# Patient Record
Sex: FEMALE | Race: WHITE | NOT HISPANIC OR LATINO | Employment: OTHER | URBAN - METROPOLITAN AREA
[De-identification: names, ages, dates, MRNs, and addresses within clinical notes are randomized per-mention and may not be internally consistent; named-entity substitution may affect disease eponyms.]

---

## 2017-01-19 RX ORDER — PRAVASTATIN SODIUM 20 MG
TABLET ORAL
Qty: 90 TABLET | Refills: 1 | Status: SHIPPED | OUTPATIENT
Start: 2017-01-19 | End: 2017-06-13 | Stop reason: SDUPTHER

## 2017-04-24 DIAGNOSIS — Z79.899 HIGH RISK MEDICATION USE: Primary | ICD-10-CM

## 2017-04-24 RX ORDER — CARVEDILOL 12.5 MG/1
TABLET ORAL
Qty: 60 TABLET | Refills: 5 | Status: SHIPPED | OUTPATIENT
Start: 2017-04-24 | End: 2017-10-23 | Stop reason: SDUPTHER

## 2017-04-29 ENCOUNTER — RESULTS ENCOUNTER (OUTPATIENT)
Dept: CARDIOLOGY | Facility: CLINIC | Age: 67
End: 2017-04-29

## 2017-04-29 DIAGNOSIS — Z79.899 HIGH RISK MEDICATION USE: ICD-10-CM

## 2017-05-12 PROCEDURE — 87086 URINE CULTURE/COLONY COUNT: CPT | Performed by: NURSE PRACTITIONER

## 2017-06-14 RX ORDER — PRAVASTATIN SODIUM 20 MG
TABLET ORAL
Qty: 90 TABLET | Refills: 1 | Status: SHIPPED | OUTPATIENT
Start: 2017-06-14 | End: 2017-12-22 | Stop reason: SDUPTHER

## 2017-06-14 RX ORDER — CLOPIDOGREL BISULFATE 75 MG/1
TABLET ORAL
Qty: 90 TABLET | Refills: 2 | Status: SHIPPED | OUTPATIENT
Start: 2017-06-14 | End: 2017-07-26 | Stop reason: SDUPTHER

## 2017-07-26 RX ORDER — CLOPIDOGREL BISULFATE 75 MG/1
TABLET ORAL
Qty: 90 TABLET | Refills: 2 | Status: SHIPPED | OUTPATIENT
Start: 2017-07-26 | End: 2018-09-18 | Stop reason: SDUPTHER

## 2017-09-06 PROCEDURE — 87186 SC STD MICRODIL/AGAR DIL: CPT | Performed by: EMERGENCY MEDICINE

## 2017-09-06 PROCEDURE — 87086 URINE CULTURE/COLONY COUNT: CPT | Performed by: EMERGENCY MEDICINE

## 2017-09-06 PROCEDURE — 87077 CULTURE AEROBIC IDENTIFY: CPT | Performed by: EMERGENCY MEDICINE

## 2017-09-09 ENCOUNTER — TELEPHONE (OUTPATIENT)
Dept: URGENT CARE | Facility: CLINIC | Age: 67
End: 2017-09-09

## 2017-10-23 RX ORDER — CARVEDILOL 12.5 MG/1
TABLET ORAL
Qty: 60 TABLET | Refills: 5 | Status: SHIPPED | OUTPATIENT
Start: 2017-10-23 | End: 2018-03-28 | Stop reason: SDUPTHER

## 2017-11-08 ENCOUNTER — TRANSCRIBE ORDERS (OUTPATIENT)
Dept: ADMINISTRATIVE | Facility: HOSPITAL | Age: 67
End: 2017-11-08

## 2017-11-08 DIAGNOSIS — Z12.31 VISIT FOR SCREENING MAMMOGRAM: Primary | ICD-10-CM

## 2017-12-07 ENCOUNTER — HOSPITAL ENCOUNTER (OUTPATIENT)
Dept: MAMMOGRAPHY | Facility: HOSPITAL | Age: 67
Discharge: HOME | End: 2017-12-07
Attending: FAMILY MEDICINE | Admitting: FAMILY MEDICINE

## 2017-12-07 DIAGNOSIS — Z12.31 VISIT FOR SCREENING MAMMOGRAM: ICD-10-CM

## 2017-12-07 PROCEDURE — 77063 BREAST TOMOSYNTHESIS BI: CPT

## 2017-12-07 PROCEDURE — G0202 SCR MAMMO BI INCL CAD: HCPCS

## 2017-12-08 PROCEDURE — G0202 SCR MAMMO BI INCL CAD: HCPCS | Performed by: RADIOLOGY

## 2017-12-08 PROCEDURE — 77063 BREAST TOMOSYNTHESIS BI: CPT | Performed by: RADIOLOGY

## 2017-12-22 RX ORDER — PRAVASTATIN SODIUM 20 MG
TABLET ORAL
Qty: 90 TABLET | Refills: 1 | Status: SHIPPED | OUTPATIENT
Start: 2017-12-22 | End: 2018-05-27 | Stop reason: SDUPTHER

## 2017-12-27 PROCEDURE — 87086 URINE CULTURE/COLONY COUNT: CPT | Performed by: NURSE PRACTITIONER

## 2017-12-27 PROCEDURE — 87186 SC STD MICRODIL/AGAR DIL: CPT | Performed by: NURSE PRACTITIONER

## 2017-12-27 PROCEDURE — 87077 CULTURE AEROBIC IDENTIFY: CPT | Performed by: NURSE PRACTITIONER

## 2017-12-29 ENCOUNTER — TELEPHONE (OUTPATIENT)
Dept: URGENT CARE | Facility: CLINIC | Age: 67
End: 2017-12-29

## 2017-12-29 NOTE — TELEPHONE ENCOUNTER
Tried calling Mrs. Mccabe to advise her of her lab results and there was not a voicemail set up. Urine Culture grew E. Coli and the Macrobid she was prescribed should take care of it per PARISA Bills.

## 2017-12-29 NOTE — TELEPHONE ENCOUNTER
Mrs. Mccabe returned call to [facility] and I advised her that we got her urine culture results back and it did grow some E. Coli. I advised patient that per PARISA Bills that the Macrobid she was on should take care of it. Patient verbalized understanding. Patient stated that she now has diarrhea but the UTI seems to be getting better. I advised patient to stay hydrated and if the diarrhea got worse she needed to either follow up with her PCP or go to the emergency room. Patient verbalized understanding.

## 2018-01-07 PROCEDURE — 87086 URINE CULTURE/COLONY COUNT: CPT | Performed by: FAMILY MEDICINE

## 2018-01-07 PROCEDURE — 87186 SC STD MICRODIL/AGAR DIL: CPT | Performed by: FAMILY MEDICINE

## 2018-01-07 PROCEDURE — 87077 CULTURE AEROBIC IDENTIFY: CPT | Performed by: FAMILY MEDICINE

## 2018-01-09 ENCOUNTER — TELEPHONE (OUTPATIENT)
Dept: URGENT CARE | Facility: CLINIC | Age: 68
End: 2018-01-09

## 2018-01-09 NOTE — TELEPHONE ENCOUNTER
Spoke with Mrs. Mccabe and advised her that we got her urine culture results and it did grow E. Coli again and the medication she was prescribed should take care of it. Mrs. Mccabe stated that she was feeling much better. I advised patient that if her symptoms came back or got worse to follow up with her PCP. Patient verbalized understanding.

## 2018-03-09 ENCOUNTER — OFFICE VISIT (OUTPATIENT)
Dept: CARDIOLOGY | Facility: CLINIC | Age: 68
End: 2018-03-09

## 2018-03-09 VITALS
OXYGEN SATURATION: 96 % | BODY MASS INDEX: 32.29 KG/M2 | DIASTOLIC BLOOD PRESSURE: 62 MMHG | HEART RATE: 83 BPM | WEIGHT: 193.8 LBS | HEIGHT: 65 IN | SYSTOLIC BLOOD PRESSURE: 100 MMHG

## 2018-03-09 DIAGNOSIS — I25.10 CORONARY ARTERY DISEASE INVOLVING NATIVE CORONARY ARTERY OF NATIVE HEART WITHOUT ANGINA PECTORIS: Primary | ICD-10-CM

## 2018-03-09 DIAGNOSIS — I10 ESSENTIAL HYPERTENSION: ICD-10-CM

## 2018-03-09 DIAGNOSIS — E78.2 MIXED HYPERLIPIDEMIA: ICD-10-CM

## 2018-03-09 PROCEDURE — 99213 OFFICE O/P EST LOW 20 MIN: CPT | Performed by: INTERNAL MEDICINE

## 2018-03-09 RX ORDER — CARBOXYMETHYLCELLULOSE SODIUM 5 MG/ML
2 SOLUTION/ DROPS OPHTHALMIC 3 TIMES DAILY PRN
COMMUNITY

## 2018-03-09 RX ORDER — BUPROPION HYDROCHLORIDE 150 MG/1
150 TABLET ORAL DAILY
Refills: 0 | COMMUNITY
Start: 2018-02-22 | End: 2020-08-07

## 2018-03-09 RX ORDER — HYDROCHLOROTHIAZIDE 25 MG/1
TABLET ORAL
Refills: 0 | COMMUNITY
Start: 2018-02-19 | End: 2018-05-27 | Stop reason: HOSPADM

## 2018-03-09 NOTE — PROGRESS NOTES
Encounter Date:03/09/2018      Patient ID: Nallely Mccabe is a 67 y.o. female.    Chief Complaint: Coronary Artery Disease and Hypertension      PROBLEM LIST:  1. Coronary artery disease:  a. ER presentation on 10/18/2011 with ACS/non-ST  elevation MI.   b. Cardiac catheterization study by Dr. Chand on 10/18/2011 showed nonobstructive coronary artery disease with 30% plaque of the mid LAD, no other disease, severe LV dysfunction with EF 25% along with diastolic dysfunction.   c. Echocardiogram  on 03/02/2012 showed normalization of LV systolic function, LVEF 50% to 55%.    d. Cardiolite stress test, 03/15/2013, showed excellent exercise capacity with normal perfusion scan and an ejection fraction of 58%.    e. A 24-hour Holter monitor, March 2014, showed normal sinus rhythm with occasional PACs and PVCs, no significant arrhythmias.    f. Nuclear perfusion study, 08/06/2015, Kandy Chand MD, revealed no evidence of reversible ischemia and a small mild fixed apical defect consistent with soft  tissue attenuation artifact.  Normal LVEF of 77%.  2. Hypertension.  3.  Hyperlipidemia.  4. PUD/GERD.   5. Anxiety/depression.   6. Obesity, BMI 33.  7. Surgical history:  a.  Eye surgery.   b. Sinus surgery.    History of Present Illness  Patient presents today for follow-up with a history of CAD and cardiac risk factors.  Since last visit has been doing well from a cardiac standpoint.Had to accidental falls injuring her left shoulder and left elbow however has recovered well.  From cardiac standpoint feels well.  No compressive chest pain shortness of breath edema palpitations or syncope.  Underwent a lot of stress after her mother passed away however she is doing better now.  Has been watching diet and has lost some weight and plans to lose more.     No Known Allergies      Current Outpatient Prescriptions:   •  acetaminophen (TYLENOL) 500 MG tablet, Take 500 mg by mouth As Needed for mild pain (1-3)., Disp: , Rfl:    •  ALPRAZolam (XANAX) 1 MG tablet, 3 (Three) Times a Day As Needed., Disp: , Rfl: 0  •  aspirin  MG tablet, Take 325 mg by mouth Daily., Disp: , Rfl:   •  benzonatate (TESSALON) 100 MG capsule, As Needed., Disp: , Rfl: 0  •  buPROPion XL (WELLBUTRIN XL) 150 MG 24 hr tablet, Daily., Disp: , Rfl: 0  •  carboxymethylcellulose (REFRESH TEARS) 0.5 % solution, 2 drops 3 (Three) Times a Day As Needed for Dry Eyes., Disp: , Rfl:   •  carvedilol (COREG) 12.5 MG tablet, take 1 tablet by mouth twice a day, Disp: 60 tablet, Rfl: 5  •  clopidogrel (PLAVIX) 75 MG tablet, take 1 tablet by mouth once daily, Disp: 90 tablet, Rfl: 2  •  DEXILANT 60 MG capsule, Daily., Disp: , Rfl: 1  •  diclofenac (VOLTAREN) 1 % gel gel, Apply 4 g topically As Needed., Disp: , Rfl:   •  dicyclomine (BENTYL) 10 MG capsule, As Needed., Disp: , Rfl: 0  •  fluticasone (FLONASE) 50 MCG/ACT nasal spray, Daily., Disp: , Rfl: 0  •  hydrochlorothiazide (HYDRODIURIL) 25 MG tablet, take 1 tablet by mouth once daily, Disp: , Rfl: 0  •  meclizine (ANTIVERT) 25 MG tablet, As Needed., Disp: , Rfl: 0  •  MEGARED OMEGA-3 KRILL  MG capsule, Take 1,000 mg by mouth Daily., Disp: , Rfl:   •  meloxicam (MOBIC) 15 MG tablet, take 1 tablet by mouth once daily, Disp: , Rfl: 0  •  Multiple Vitamins-Minerals (PRESERVISION AREDS 2 PO), Take  by mouth 2 (Two) Times a Day., Disp: , Rfl:   •  nitroglycerin (NITROSTAT) 0.4 MG SL tablet, Place 0.4 mg under the tongue As Needed for chest pain. Take no more than 3 doses in 15 minutes., Disp: , Rfl:   •  ondansetron (ZOFRAN) 4 MG tablet, take 1 tablet by mouth four times a day if needed for nausea, Disp: , Rfl: 0  •  polyethylene glycol (MIRALAX) powder, Daily., Disp: , Rfl: 0  •  pravastatin (PRAVACHOL) 20 MG tablet, take 1 tablet by mouth at bedtime, Disp: 90 tablet, Rfl: 1    The following portions of the patient's history were reviewed and updated as appropriate: allergies, current medications, past family history,  "past medical history, past social history, past surgical history and problem list.    ROS  Review of Systems   Constitution: Negative for chills, fever, weight gain and weight loss.   Cardiovascular: Negative for chest pain, claudication, dyspnea on exertion, leg swelling, orthopnea, palpitations, paroxysmal nocturnal dyspnea and syncope.        No dizziness   Gastrointestinal: Negative for abdominal pain, constipation, diarrhea, nausea and vomiting.   Genitourinary:        No urinary symptoms   Neurological:        No symptoms of stroke.   All other systems reviewed and are negative.    Objective:     Blood pressure 100/62, pulse 83, height 165.1 cm (65\"), weight 87.9 kg (193 lb 12.8 oz), SpO2 96 %.        Physical Exam  Constitutional: She appears well-developed and well-nourished.   HENT:   HEENT exam unremarkable.   Neck: Neck supple. No JVD present.   No carotid bruits.   Cardiovascular: Normal rate, regular rhythm and normal heart sounds.    No murmur heard.  2 plus symmetric pulses.   Pulmonary/Chest: Breath sounds normal. Does not exhibit tenderness.   Abdominal:   Abdomen benign.   Musculoskeletal: Does not exhibit edema.   Neurological:   Neurological exam unremarkable.   Vitals reviewed.    Lab Review:   Procedures       Assessment:      Diagnosis Plan   1. Coronary artery disease involving native coronary artery of native heart without angina pectoris  Stable, continue current medications.     2. Mixed hyperlipidemia  Continue statin, monitored by PCP.     3. Essential hypertension  Well controlled, she can change her HCTZ to every other day.       Plan:   Change HCTZ to every other day, continue rest of the current medications.  Overall cardiac status is stable.  FU in 12 MO, sooner as needed.  Thank you for allowing us to participate in the care of your patient.     Scribed for Kandy Chand MD by Edvin Vargas. 3/9/2018  12:02 PM    IKandy MD, personally performed the services described in this " documentation as scribed by the above named individual in my presence, and it is both accurate and complete.  3/9/2018  12:09 PM        Please note that portions of this note may have been completed with a voice recognition program. Efforts were made to edit the dictations, but occasionally words are mistranscribed.

## 2018-03-15 PROCEDURE — 87086 URINE CULTURE/COLONY COUNT: CPT | Performed by: NURSE PRACTITIONER

## 2018-03-18 ENCOUNTER — TELEPHONE (OUTPATIENT)
Dept: URGENT CARE | Facility: CLINIC | Age: 68
End: 2018-03-18

## 2018-03-29 RX ORDER — CARVEDILOL 12.5 MG/1
TABLET ORAL
Qty: 60 TABLET | Refills: 5 | Status: SHIPPED | OUTPATIENT
Start: 2018-03-29 | End: 2018-10-23 | Stop reason: SDUPTHER

## 2018-05-23 ENCOUNTER — APPOINTMENT (OUTPATIENT)
Dept: CT IMAGING | Facility: HOSPITAL | Age: 68
End: 2018-05-23

## 2018-05-23 ENCOUNTER — HOSPITAL ENCOUNTER (INPATIENT)
Facility: HOSPITAL | Age: 68
LOS: 4 days | Discharge: HOME | End: 2018-05-27
Attending: EMERGENCY MEDICINE | Admitting: INTERNAL MEDICINE

## 2018-05-23 ENCOUNTER — APPOINTMENT (OUTPATIENT)
Dept: GENERAL RADIOLOGY | Facility: HOSPITAL | Age: 68
End: 2018-05-23

## 2018-05-23 DIAGNOSIS — Z78.9 IMPAIRED MOBILITY AND ADLS: ICD-10-CM

## 2018-05-23 DIAGNOSIS — E86.0 DEHYDRATION: ICD-10-CM

## 2018-05-23 DIAGNOSIS — D72.829 LEUKOCYTOSIS, UNSPECIFIED TYPE: ICD-10-CM

## 2018-05-23 DIAGNOSIS — N39.0 URINARY TRACT INFECTION WITHOUT HEMATURIA, SITE UNSPECIFIED: ICD-10-CM

## 2018-05-23 DIAGNOSIS — Z74.09 IMPAIRED MOBILITY AND ADLS: ICD-10-CM

## 2018-05-23 DIAGNOSIS — E87.6 HYPOKALEMIA: ICD-10-CM

## 2018-05-23 DIAGNOSIS — A41.9 SEPSIS, DUE TO UNSPECIFIED ORGANISM: Primary | ICD-10-CM

## 2018-05-23 DIAGNOSIS — N17.9 AKI (ACUTE KIDNEY INJURY) (HCC): ICD-10-CM

## 2018-05-23 DIAGNOSIS — Z74.09 IMPAIRED FUNCTIONAL MOBILITY, BALANCE, GAIT, AND ENDURANCE: ICD-10-CM

## 2018-05-23 PROBLEM — G93.41 METABOLIC ENCEPHALOPATHY: Status: ACTIVE | Noted: 2018-05-23

## 2018-05-23 LAB
ALBUMIN SERPL-MCNC: 3.7 G/DL (ref 3.2–4.8)
ALBUMIN/GLOB SERPL: 1.2 G/DL (ref 1.5–2.5)
ALP SERPL-CCNC: 131 U/L (ref 25–100)
ALT SERPL W P-5'-P-CCNC: 26 U/L (ref 7–40)
ANION GAP SERPL CALCULATED.3IONS-SCNC: 10 MMOL/L (ref 3–11)
AST SERPL-CCNC: 37 U/L (ref 0–33)
BACTERIA UR QL AUTO: ABNORMAL /HPF
BASOPHILS # BLD AUTO: 0.02 10*3/MM3 (ref 0–0.2)
BASOPHILS NFR BLD AUTO: 0.2 % (ref 0–1)
BILIRUB SERPL-MCNC: 0.7 MG/DL (ref 0.3–1.2)
BILIRUB UR QL STRIP: NEGATIVE
BILIRUB UR QL STRIP: NEGATIVE
BUN BLD-MCNC: 24 MG/DL (ref 9–23)
BUN/CREAT SERPL: 13.3 (ref 7–25)
CALCIUM SPEC-SCNC: 9.1 MG/DL (ref 8.7–10.4)
CHLORIDE SERPL-SCNC: 97 MMOL/L (ref 99–109)
CK SERPL-CCNC: 332 U/L (ref 26–174)
CLARITY UR: ABNORMAL
CLARITY UR: ABNORMAL
CO2 SERPL-SCNC: 26 MMOL/L (ref 20–31)
COLOR UR: YELLOW
COLOR UR: YELLOW
CREAT BLD-MCNC: 1.8 MG/DL (ref 0.6–1.3)
D-LACTATE SERPL-SCNC: 1.2 MMOL/L (ref 0.5–2)
DEPRECATED RDW RBC AUTO: 46.2 FL (ref 37–54)
EOSINOPHIL # BLD AUTO: 0.01 10*3/MM3 (ref 0–0.3)
EOSINOPHIL NFR BLD AUTO: 0.1 % (ref 0–3)
ERYTHROCYTE [DISTWIDTH] IN BLOOD BY AUTOMATED COUNT: 14.8 % (ref 11.3–14.5)
GFR SERPL CREATININE-BSD FRML MDRD: 28 ML/MIN/1.73
GLOBULIN UR ELPH-MCNC: 3.1 GM/DL
GLUCOSE BLD-MCNC: 136 MG/DL (ref 70–100)
GLUCOSE UR STRIP-MCNC: NEGATIVE MG/DL
GLUCOSE UR STRIP-MCNC: NEGATIVE MG/DL
GRAN CASTS URNS QL MICRO: ABNORMAL /LPF
HCT VFR BLD AUTO: 33.4 % (ref 34.5–44)
HGB BLD-MCNC: 11.4 G/DL (ref 11.5–15.5)
HGB UR QL STRIP.AUTO: ABNORMAL
HGB UR QL STRIP.AUTO: ABNORMAL
HYALINE CASTS UR QL AUTO: ABNORMAL /LPF
IMM GRANULOCYTES # BLD: 0.04 10*3/MM3 (ref 0–0.03)
IMM GRANULOCYTES NFR BLD: 0.3 % (ref 0–0.6)
KETONES UR QL STRIP: NEGATIVE
KETONES UR QL STRIP: NEGATIVE
LEUKOCYTE ESTERASE UR QL STRIP.AUTO: ABNORMAL
LEUKOCYTE ESTERASE UR QL STRIP.AUTO: ABNORMAL
LYMPHOCYTES # BLD AUTO: 1.37 10*3/MM3 (ref 0.6–4.8)
LYMPHOCYTES NFR BLD AUTO: 11.1 % (ref 24–44)
MCH RBC QN AUTO: 28.7 PG (ref 27–31)
MCHC RBC AUTO-ENTMCNC: 34.1 G/DL (ref 32–36)
MCV RBC AUTO: 84.1 FL (ref 80–99)
MONOCYTES # BLD AUTO: 1.25 10*3/MM3 (ref 0–1)
MONOCYTES NFR BLD AUTO: 10.1 % (ref 0–12)
NEUTROPHILS # BLD AUTO: 9.63 10*3/MM3 (ref 1.5–8.3)
NEUTROPHILS NFR BLD AUTO: 78.2 % (ref 41–71)
NITRITE UR QL STRIP: POSITIVE
NITRITE UR QL STRIP: POSITIVE
PH UR STRIP.AUTO: <=5 [PH] (ref 5–8)
PH UR STRIP.AUTO: <=5 [PH] (ref 5–8)
PLATELET # BLD AUTO: 212 10*3/MM3 (ref 150–450)
PMV BLD AUTO: 9.8 FL (ref 6–12)
POTASSIUM BLD-SCNC: 3.1 MMOL/L (ref 3.5–5.5)
PROT SERPL-MCNC: 6.8 G/DL (ref 5.7–8.2)
PROT UR QL STRIP: ABNORMAL
PROT UR QL STRIP: ABNORMAL
RBC # BLD AUTO: 3.97 10*6/MM3 (ref 3.89–5.14)
RBC # UR: ABNORMAL /HPF
REF LAB TEST METHOD: ABNORMAL
SODIUM BLD-SCNC: 133 MMOL/L (ref 132–146)
SP GR UR STRIP: 1.01 (ref 1–1.03)
SP GR UR STRIP: 1.01 (ref 1–1.03)
SQUAMOUS #/AREA URNS HPF: ABNORMAL /HPF
UROBILINOGEN UR QL STRIP: ABNORMAL
UROBILINOGEN UR QL STRIP: ABNORMAL
WBC NRBC COR # BLD: 12.32 10*3/MM3 (ref 3.5–10.8)
WBC UR QL AUTO: ABNORMAL /HPF

## 2018-05-23 PROCEDURE — 99284 EMERGENCY DEPT VISIT MOD MDM: CPT

## 2018-05-23 PROCEDURE — 25010000002 ONDANSETRON PER 1 MG: Performed by: INTERNAL MEDICINE

## 2018-05-23 PROCEDURE — 99223 1ST HOSP IP/OBS HIGH 75: CPT | Performed by: INTERNAL MEDICINE

## 2018-05-23 PROCEDURE — 83605 ASSAY OF LACTIC ACID: CPT | Performed by: PHYSICIAN ASSISTANT

## 2018-05-23 PROCEDURE — 85025 COMPLETE CBC W/AUTO DIFF WBC: CPT | Performed by: PHYSICIAN ASSISTANT

## 2018-05-23 PROCEDURE — P9612 CATHETERIZE FOR URINE SPEC: HCPCS

## 2018-05-23 PROCEDURE — 74176 CT ABD & PELVIS W/O CONTRAST: CPT

## 2018-05-23 PROCEDURE — 87086 URINE CULTURE/COLONY COUNT: CPT | Performed by: EMERGENCY MEDICINE

## 2018-05-23 PROCEDURE — 87186 SC STD MICRODIL/AGAR DIL: CPT | Performed by: EMERGENCY MEDICINE

## 2018-05-23 PROCEDURE — 25010000002 PIPERACILLIN-TAZOBACTAM: Performed by: PHYSICIAN ASSISTANT

## 2018-05-23 PROCEDURE — 87040 BLOOD CULTURE FOR BACTERIA: CPT | Performed by: PHYSICIAN ASSISTANT

## 2018-05-23 PROCEDURE — 71046 X-RAY EXAM CHEST 2 VIEWS: CPT

## 2018-05-23 PROCEDURE — 81003 URINALYSIS AUTO W/O SCOPE: CPT | Performed by: EMERGENCY MEDICINE

## 2018-05-23 PROCEDURE — 25010000002 VANCOMYCIN 10 G RECONSTITUTED SOLUTION: Performed by: PHYSICIAN ASSISTANT

## 2018-05-23 PROCEDURE — 82550 ASSAY OF CK (CPK): CPT | Performed by: PHYSICIAN ASSISTANT

## 2018-05-23 PROCEDURE — 87077 CULTURE AEROBIC IDENTIFY: CPT | Performed by: EMERGENCY MEDICINE

## 2018-05-23 PROCEDURE — 81001 URINALYSIS AUTO W/SCOPE: CPT | Performed by: EMERGENCY MEDICINE

## 2018-05-23 PROCEDURE — 80053 COMPREHEN METABOLIC PANEL: CPT | Performed by: PHYSICIAN ASSISTANT

## 2018-05-23 PROCEDURE — 25010000002 HEPARIN (PORCINE) PER 1000 UNITS: Performed by: INTERNAL MEDICINE

## 2018-05-23 RX ORDER — SODIUM CHLORIDE 9 MG/ML
100 INJECTION, SOLUTION INTRAVENOUS CONTINUOUS
Status: DISCONTINUED | OUTPATIENT
Start: 2018-05-23 | End: 2018-05-24

## 2018-05-23 RX ORDER — BUPROPION HYDROCHLORIDE 150 MG/1
150 TABLET ORAL DAILY
Status: DISCONTINUED | OUTPATIENT
Start: 2018-05-24 | End: 2018-05-27 | Stop reason: HOSPADM

## 2018-05-23 RX ORDER — DICYCLOMINE HYDROCHLORIDE 10 MG/1
10 CAPSULE ORAL AS NEEDED
Status: DISCONTINUED | OUTPATIENT
Start: 2018-05-23 | End: 2018-05-27 | Stop reason: HOSPADM

## 2018-05-23 RX ORDER — POTASSIUM CHLORIDE 750 MG/1
40 CAPSULE, EXTENDED RELEASE ORAL DAILY
Status: DISCONTINUED | OUTPATIENT
Start: 2018-05-23 | End: 2018-05-27 | Stop reason: HOSPADM

## 2018-05-23 RX ORDER — FLUTICASONE PROPIONATE 50 MCG
1 SPRAY, SUSPENSION (ML) NASAL DAILY
Status: DISCONTINUED | OUTPATIENT
Start: 2018-05-24 | End: 2018-05-27 | Stop reason: HOSPADM

## 2018-05-23 RX ORDER — ONDANSETRON 4 MG/1
4 TABLET, FILM COATED ORAL EVERY 6 HOURS PRN
Status: DISCONTINUED | OUTPATIENT
Start: 2018-05-23 | End: 2018-05-27 | Stop reason: HOSPADM

## 2018-05-23 RX ORDER — MULTIPLE VITAMINS W/ MINERALS TAB 9MG-400MCG
1 TAB ORAL DAILY
Status: DISCONTINUED | OUTPATIENT
Start: 2018-05-24 | End: 2018-05-27 | Stop reason: HOSPADM

## 2018-05-23 RX ORDER — ALPRAZOLAM 1 MG/1
1 TABLET ORAL 3 TIMES DAILY PRN
Status: DISCONTINUED | OUTPATIENT
Start: 2018-05-23 | End: 2018-05-27 | Stop reason: HOSPADM

## 2018-05-23 RX ORDER — HYDROCHLOROTHIAZIDE 25 MG/1
25 TABLET ORAL DAILY
Status: DISCONTINUED | OUTPATIENT
Start: 2018-05-24 | End: 2018-05-24

## 2018-05-23 RX ORDER — ACETAMINOPHEN 500 MG
500 TABLET ORAL EVERY 6 HOURS PRN
Status: DISCONTINUED | OUTPATIENT
Start: 2018-05-23 | End: 2018-05-23 | Stop reason: SDUPTHER

## 2018-05-23 RX ORDER — HEPARIN SODIUM 5000 [USP'U]/ML
5000 INJECTION, SOLUTION INTRAVENOUS; SUBCUTANEOUS EVERY 12 HOURS SCHEDULED
Status: DISCONTINUED | OUTPATIENT
Start: 2018-05-23 | End: 2018-05-27 | Stop reason: HOSPADM

## 2018-05-23 RX ORDER — CLOPIDOGREL BISULFATE 75 MG/1
75 TABLET ORAL DAILY
Status: DISCONTINUED | OUTPATIENT
Start: 2018-05-24 | End: 2018-05-27 | Stop reason: HOSPADM

## 2018-05-23 RX ORDER — PANTOPRAZOLE SODIUM 40 MG/1
40 TABLET, DELAYED RELEASE ORAL
Status: DISCONTINUED | OUTPATIENT
Start: 2018-05-24 | End: 2018-05-27 | Stop reason: HOSPADM

## 2018-05-23 RX ORDER — ONDANSETRON 2 MG/ML
4 INJECTION INTRAMUSCULAR; INTRAVENOUS EVERY 6 HOURS PRN
Status: DISCONTINUED | OUTPATIENT
Start: 2018-05-23 | End: 2018-05-27 | Stop reason: HOSPADM

## 2018-05-23 RX ORDER — POTASSIUM CHLORIDE 1.5 G/1.77G
40 POWDER, FOR SOLUTION ORAL AS NEEDED
Status: DISCONTINUED | OUTPATIENT
Start: 2018-05-23 | End: 2018-05-27 | Stop reason: HOSPADM

## 2018-05-23 RX ORDER — MECLIZINE HYDROCHLORIDE 25 MG/1
25 TABLET ORAL 3 TIMES DAILY PRN
Status: DISCONTINUED | OUTPATIENT
Start: 2018-05-23 | End: 2018-05-27 | Stop reason: HOSPADM

## 2018-05-23 RX ORDER — POTASSIUM CHLORIDE 7.45 MG/ML
10 INJECTION INTRAVENOUS
Status: DISCONTINUED | OUTPATIENT
Start: 2018-05-23 | End: 2018-05-27 | Stop reason: HOSPADM

## 2018-05-23 RX ORDER — POTASSIUM CHLORIDE 750 MG/1
40 CAPSULE, EXTENDED RELEASE ORAL AS NEEDED
Status: DISCONTINUED | OUTPATIENT
Start: 2018-05-23 | End: 2018-05-27 | Stop reason: HOSPADM

## 2018-05-23 RX ORDER — ASPIRIN 325 MG
325 TABLET, DELAYED RELEASE (ENTERIC COATED) ORAL DAILY
Status: DISCONTINUED | OUTPATIENT
Start: 2018-05-24 | End: 2018-05-27 | Stop reason: HOSPADM

## 2018-05-23 RX ORDER — CARVEDILOL 12.5 MG/1
12.5 TABLET ORAL 2 TIMES DAILY
Status: DISCONTINUED | OUTPATIENT
Start: 2018-05-23 | End: 2018-05-24

## 2018-05-23 RX ORDER — CEFTRIAXONE SODIUM 2 G/50ML
2 INJECTION, SOLUTION INTRAVENOUS EVERY 24 HOURS
Status: DISCONTINUED | OUTPATIENT
Start: 2018-05-23 | End: 2018-05-25

## 2018-05-23 RX ORDER — POLYVINYL ALCOHOL 14 MG/ML
1 SOLUTION/ DROPS OPHTHALMIC
Status: DISCONTINUED | OUTPATIENT
Start: 2018-05-23 | End: 2018-05-27 | Stop reason: HOSPADM

## 2018-05-23 RX ORDER — ACETAMINOPHEN 325 MG/1
650 TABLET ORAL EVERY 4 HOURS PRN
Status: DISCONTINUED | OUTPATIENT
Start: 2018-05-23 | End: 2018-05-27 | Stop reason: HOSPADM

## 2018-05-23 RX ORDER — ATORVASTATIN CALCIUM 10 MG/1
10 TABLET, FILM COATED ORAL DAILY
Status: DISCONTINUED | OUTPATIENT
Start: 2018-05-24 | End: 2018-05-27 | Stop reason: HOSPADM

## 2018-05-23 RX ORDER — SODIUM CHLORIDE 9 MG/ML
125 INJECTION, SOLUTION INTRAVENOUS CONTINUOUS
Status: DISCONTINUED | OUTPATIENT
Start: 2018-05-23 | End: 2018-05-24

## 2018-05-23 RX ORDER — NITROGLYCERIN 0.4 MG/1
0.4 TABLET SUBLINGUAL AS NEEDED
Status: DISCONTINUED | OUTPATIENT
Start: 2018-05-23 | End: 2018-05-27 | Stop reason: HOSPADM

## 2018-05-23 RX ADMIN — SODIUM CHLORIDE 1000 ML: 9 INJECTION, SOLUTION INTRAVENOUS at 19:00

## 2018-05-23 RX ADMIN — SODIUM CHLORIDE 1000 ML: 9 INJECTION, SOLUTION INTRAVENOUS at 20:58

## 2018-05-23 RX ADMIN — HEPARIN SODIUM 5000 UNITS: 5000 INJECTION, SOLUTION INTRAVENOUS; SUBCUTANEOUS at 23:23

## 2018-05-23 RX ADMIN — POTASSIUM CHLORIDE 40 MEQ: 750 CAPSULE, EXTENDED RELEASE ORAL at 22:07

## 2018-05-23 RX ADMIN — ALPRAZOLAM 1 MG: 0.25 TABLET ORAL at 23:25

## 2018-05-23 RX ADMIN — VANCOMYCIN HYDROCHLORIDE 1500 MG: 10 INJECTION, POWDER, LYOPHILIZED, FOR SOLUTION INTRAVENOUS at 20:59

## 2018-05-23 RX ADMIN — ONDANSETRON 4 MG: 2 INJECTION INTRAMUSCULAR; INTRAVENOUS at 23:42

## 2018-05-23 RX ADMIN — ACETAMINOPHEN 650 MG: 325 TABLET ORAL at 23:41

## 2018-05-23 RX ADMIN — TAZOBACTAM SODIUM AND PIPERACILLIN SODIUM 4.5 G: .5; 4 INJECTION, POWDER, LYOPHILIZED, FOR SOLUTION INTRAVENOUS at 20:21

## 2018-05-23 RX ADMIN — CARVEDILOL 12.5 MG: 12.5 TABLET, FILM COATED ORAL at 23:26

## 2018-05-23 RX ADMIN — SODIUM CHLORIDE 100 ML/HR: 9 INJECTION, SOLUTION INTRAVENOUS at 23:31

## 2018-05-24 PROBLEM — N10 PYELONEPHRITIS, ACUTE: Status: ACTIVE | Noted: 2018-05-24

## 2018-05-24 LAB
ANION GAP SERPL CALCULATED.3IONS-SCNC: 6 MMOL/L (ref 3–11)
BASOPHILS # BLD AUTO: 0.02 10*3/MM3 (ref 0–0.2)
BASOPHILS NFR BLD AUTO: 0.2 % (ref 0–1)
BUN BLD-MCNC: 22 MG/DL (ref 9–23)
BUN/CREAT SERPL: 12.9 (ref 7–25)
CALCIUM SPEC-SCNC: 7.9 MG/DL (ref 8.7–10.4)
CHLORIDE SERPL-SCNC: 107 MMOL/L (ref 99–109)
CO2 SERPL-SCNC: 24 MMOL/L (ref 20–31)
CREAT BLD-MCNC: 1.7 MG/DL (ref 0.6–1.3)
DEPRECATED RDW RBC AUTO: 47.2 FL (ref 37–54)
EOSINOPHIL # BLD AUTO: 0.05 10*3/MM3 (ref 0–0.3)
EOSINOPHIL NFR BLD AUTO: 0.5 % (ref 0–3)
ERYTHROCYTE [DISTWIDTH] IN BLOOD BY AUTOMATED COUNT: 15.4 % (ref 11.3–14.5)
GFR SERPL CREATININE-BSD FRML MDRD: 30 ML/MIN/1.73
GLUCOSE BLD-MCNC: 117 MG/DL (ref 70–100)
HCT VFR BLD AUTO: 29.1 % (ref 34.5–44)
HGB BLD-MCNC: 9.9 G/DL (ref 11.5–15.5)
IMM GRANULOCYTES # BLD: 0.03 10*3/MM3 (ref 0–0.03)
IMM GRANULOCYTES NFR BLD: 0.3 % (ref 0–0.6)
LYMPHOCYTES # BLD AUTO: 1.15 10*3/MM3 (ref 0.6–4.8)
LYMPHOCYTES NFR BLD AUTO: 12.6 % (ref 24–44)
MAGNESIUM SERPL-MCNC: 2 MG/DL (ref 1.3–2.7)
MCH RBC QN AUTO: 28.5 PG (ref 27–31)
MCHC RBC AUTO-ENTMCNC: 34 G/DL (ref 32–36)
MCV RBC AUTO: 83.9 FL (ref 80–99)
MONOCYTES # BLD AUTO: 1.27 10*3/MM3 (ref 0–1)
MONOCYTES NFR BLD AUTO: 14 % (ref 0–12)
NEUTROPHILS # BLD AUTO: 6.58 10*3/MM3 (ref 1.5–8.3)
NEUTROPHILS NFR BLD AUTO: 72.4 % (ref 41–71)
PLATELET # BLD AUTO: 186 10*3/MM3 (ref 150–450)
PMV BLD AUTO: 10 FL (ref 6–12)
POTASSIUM BLD-SCNC: 3.4 MMOL/L (ref 3.5–5.5)
POTASSIUM BLD-SCNC: 3.9 MMOL/L (ref 3.5–5.5)
RBC # BLD AUTO: 3.47 10*6/MM3 (ref 3.89–5.14)
SODIUM BLD-SCNC: 137 MMOL/L (ref 132–146)
WBC NRBC COR # BLD: 9.1 10*3/MM3 (ref 3.5–10.8)

## 2018-05-24 PROCEDURE — 84132 ASSAY OF SERUM POTASSIUM: CPT | Performed by: INTERNAL MEDICINE

## 2018-05-24 PROCEDURE — 25010000002 HEPARIN (PORCINE) PER 1000 UNITS: Performed by: INTERNAL MEDICINE

## 2018-05-24 PROCEDURE — 80048 BASIC METABOLIC PNL TOTAL CA: CPT | Performed by: INTERNAL MEDICINE

## 2018-05-24 PROCEDURE — 85025 COMPLETE CBC W/AUTO DIFF WBC: CPT | Performed by: INTERNAL MEDICINE

## 2018-05-24 PROCEDURE — 99233 SBSQ HOSP IP/OBS HIGH 50: CPT | Performed by: INTERNAL MEDICINE

## 2018-05-24 PROCEDURE — 83735 ASSAY OF MAGNESIUM: CPT | Performed by: INTERNAL MEDICINE

## 2018-05-24 PROCEDURE — 25010000003 CEFTRIAXONE PER 250 MG: Performed by: INTERNAL MEDICINE

## 2018-05-24 RX ORDER — CARVEDILOL 6.25 MG/1
6.25 TABLET ORAL 2 TIMES DAILY
Status: DISCONTINUED | OUTPATIENT
Start: 2018-05-24 | End: 2018-05-27 | Stop reason: HOSPADM

## 2018-05-24 RX ORDER — SACCHAROMYCES BOULARDII 250 MG
250 CAPSULE ORAL 2 TIMES DAILY
Status: DISCONTINUED | OUTPATIENT
Start: 2018-05-24 | End: 2018-05-27 | Stop reason: HOSPADM

## 2018-05-24 RX ORDER — SODIUM CHLORIDE 9 MG/ML
75 INJECTION, SOLUTION INTRAVENOUS CONTINUOUS
Status: DISCONTINUED | OUTPATIENT
Start: 2018-05-24 | End: 2018-05-25

## 2018-05-24 RX ADMIN — BUPROPION HYDROCHLORIDE 150 MG: 150 TABLET, FILM COATED, EXTENDED RELEASE ORAL at 08:17

## 2018-05-24 RX ADMIN — MULTIPLE VITAMINS W/ MINERALS TAB 1 TABLET: TAB ORAL at 08:17

## 2018-05-24 RX ADMIN — POTASSIUM CHLORIDE 40 MEQ: 750 CAPSULE, EXTENDED RELEASE ORAL at 06:09

## 2018-05-24 RX ADMIN — HEPARIN SODIUM 5000 UNITS: 5000 INJECTION, SOLUTION INTRAVENOUS; SUBCUTANEOUS at 20:30

## 2018-05-24 RX ADMIN — PANTOPRAZOLE SODIUM 40 MG: 40 TABLET, DELAYED RELEASE ORAL at 06:10

## 2018-05-24 RX ADMIN — ACETAMINOPHEN 650 MG: 325 TABLET ORAL at 11:31

## 2018-05-24 RX ADMIN — FLUTICASONE PROPIONATE 1 SPRAY: 50 SPRAY, METERED NASAL at 20:30

## 2018-05-24 RX ADMIN — SODIUM CHLORIDE 75 ML/HR: 9 INJECTION, SOLUTION INTRAVENOUS at 22:32

## 2018-05-24 RX ADMIN — ASPIRIN 325 MG: 325 TABLET, DELAYED RELEASE ORAL at 08:17

## 2018-05-24 RX ADMIN — SODIUM CHLORIDE 125 ML/HR: 9 INJECTION, SOLUTION INTRAVENOUS at 10:10

## 2018-05-24 RX ADMIN — POTASSIUM CHLORIDE 40 MEQ: 750 CAPSULE, EXTENDED RELEASE ORAL at 09:57

## 2018-05-24 RX ADMIN — Medication 250 MG: at 08:56

## 2018-05-24 RX ADMIN — CLOPIDOGREL BISULFATE 75 MG: 75 TABLET ORAL at 08:18

## 2018-05-24 RX ADMIN — Medication 250 MG: at 20:30

## 2018-05-24 RX ADMIN — ATORVASTATIN CALCIUM 10 MG: 10 TABLET, FILM COATED ORAL at 08:18

## 2018-05-24 RX ADMIN — SODIUM CHLORIDE 500 ML: 9 INJECTION, SOLUTION INTRAVENOUS at 05:35

## 2018-05-24 RX ADMIN — HEPARIN SODIUM 5000 UNITS: 5000 INJECTION, SOLUTION INTRAVENOUS; SUBCUTANEOUS at 08:18

## 2018-05-24 RX ADMIN — CEFTRIAXONE SODIUM 2 G: 2 INJECTION, SOLUTION INTRAVENOUS at 00:19

## 2018-05-24 RX ADMIN — PANTOPRAZOLE SODIUM 40 MG: 40 TABLET, DELAYED RELEASE ORAL at 16:46

## 2018-05-24 NOTE — PROGRESS NOTES
Discharge Planning Assessment  [facility]     Patient Name: Nallely Mccabe  MRN: [id number]  Today's Date: 5/24/2018    Admit Date: 5/23/2018          Discharge Needs Assessment     Row Name 05/24/18 1052       Living Environment    Lives With alone    Current Living Arrangements home/apartment/condo    Primary Care Provided by self    Provides Primary Care For no one    Family Caregiver if Needed friend(s)    Able to Return to Prior Arrangements yes       Transition Planning    Patient/Family Anticipates Transition to home    Transportation Anticipated family or friend will provide       Discharge Needs Assessment    Equipment Currently Used at Home none            Discharge Plan     Row Name 05/24/18 1053       Plan    Plan Home    Patient/Family in Agreement with Plan yes    Plan Comments Spoke with patient at bedside. She lives alone in [facility] [place] she was independent with ADL's and mobility. She is interested in a RW and a BSC. Per her request a referral has been made to [facility]. [facility] will deliver DME to patient today at bedside. No other needs idenified at this time. Plan is to return home. CM following.     Final Discharge Disposition Code 01 - home or self-care        Destination     No service coordination in this encounter.      Durable Medical Equipment - Selection Complete     Service Request Status Selected Specialties Address Phone Number Fax Number    [facility] D[facility] Services [address] [place] [id number] [phone]      Dialysis/Infusion     No service coordination in this encounter.      Home Medical Care     No service coordination in this encounter.      Social Care     No service coordination in this encounter.        Expected Discharge Date and Time     Expected Discharge Date Expected Discharge Time    May 25, 2018               Demographic Summary     Row Name 05/24/18 1052       General Information    Arrived From home     Reason for Consult discharge planning            Functional Status     Row Name 05/24/18 1052       Functional Status    Usual Activity Tolerance moderate    Current Activity Tolerance fair            Psychosocial    No documentation.           Abuse/Neglect    No documentation.           Legal    No documentation.           Substance Abuse    No documentation.           Patient Forms    No documentation.         Chelsy Jacome RN

## 2018-05-24 NOTE — PLAN OF CARE
Problem: Fall Risk (Adult)  Goal: Identify Related Risk Factors and Signs and Symptoms  Outcome: Ongoing (interventions implemented as appropriate)   05/24/18 0346   Fall Risk (Adult)   Related Risk Factors (Fall Risk) confusion/agitation;gait/mobility problems;history of falls;environment unfamiliar   Signs and Symptoms (Fall Risk) presence of risk factors     Goal: Absence of Fall  Outcome: Ongoing (interventions implemented as appropriate)   05/24/18 0346   Fall Risk (Adult)   Absence of Fall making progress toward outcome       Problem: Patient Care Overview  Goal: Plan of Care Review  Outcome: Ongoing (interventions implemented as appropriate)   05/24/18 0346   Coping/Psychosocial   Plan of Care Reviewed With patient;friend   Plan of Care Review   Progress no change   OTHER   Outcome Summary VSS. Pt is disorieted to time which is improved per ED report. Pt afebrile when brought to the floor. friend was at bedside. pt currently on 2L NC while sleeping. no c/o soa or n/v. no other concerns at this time.     Goal: Discharge Needs Assessment  Outcome: Ongoing (interventions implemented as appropriate)   05/24/18 0056   Discharge Needs Assessment   Patient/Family Anticipates Transition to home   Patient/Family Anticipated Services at Transition none   Transportation Anticipated family or friend will provide       Problem: Confusion, Acute (Adult)  Goal: Identify Related Risk Factors and Signs and Symptoms  Outcome: Outcome(s) achieved Date Met: 05/24/18 05/24/18 0346   Confusion, Acute (Adult)   Related Risk Factors (Acute Confusion) dehydration;infection;mobility decreased;sleep disturbance   Signs and Symptoms (Acute Confusion) acute onset of symptoms;disorientation;thought process diminished/disorganized     Goal: Cognitive/Functional Impairments Minimized  Outcome: Ongoing (interventions implemented as appropriate)   05/24/18 0346   Confusion, Acute (Adult)   Cognitive/Functional Impairments Minimized making  progress toward outcome     Goal: Safety  Outcome: Ongoing (interventions implemented as appropriate)   05/24/18 0346   Confusion, Acute (Adult)   Safety making progress toward outcome       Problem: Infection, Risk/Actual (Adult)  Goal: Identify Related Risk Factors and Signs and Symptoms  Outcome: Outcome(s) achieved Date Met: 05/24/18 05/24/18 0346   Infection, Risk/Actual (Adult)   Related Risk Factors (Infection, Risk/Actual) exposure to microbes   Signs and Symptoms (Infection, Risk/Actual) body temperature changes;chills;weakness;lab value changes     Goal: Infection Prevention/Resolution  Outcome: Ongoing (interventions implemented as appropriate)   05/24/18 0346   Infection, Risk/Actual (Adult)   Infection Prevention/Resolution making progress toward outcome

## 2018-05-24 NOTE — PLAN OF CARE
Problem: Fall Risk (Adult)  Goal: Identify Related Risk Factors and Signs and Symptoms  Outcome: Outcome(s) achieved Date Met: 05/24/18    Goal: Absence of Fall  Outcome: Ongoing (interventions implemented as appropriate)   05/24/18 1517   Fall Risk (Adult)   Absence of Fall making progress toward outcome       Problem: Confusion, Acute (Adult)  Goal: Cognitive/Functional Impairments Minimized  Outcome: Outcome(s) achieved Date Met: 05/24/18    Goal: Safety  Outcome: Ongoing (interventions implemented as appropriate)   05/24/18 1517   Confusion, Acute (Adult)   Safety making progress toward outcome       Problem: Infection, Risk/Actual (Adult)  Goal: Infection Prevention/Resolution  Outcome: Ongoing (interventions implemented as appropriate)   05/24/18 1517   Infection, Risk/Actual (Adult)   Infection Prevention/Resolution unable to achieve outcome

## 2018-05-24 NOTE — ED PROVIDER NOTES
Subjective   67-year-old female presents to emergency department with a four-day history of progressively worsening fever or chills low abdominal pain, dysuria.  Patient was found today in her home on the floor, patient was confused, had been incontinent of urine, patient states she had been in the floor since last evening.  She is generally weak, with fever chills, nausea, low abdominal pain no flank pain.  [place].  No cough chest congestion sputum hemoptysis LE swelling back pain.  No stiff neck vision changes or photophobia.  She has a history of hypertension hyperlipidemia GERD depression and coronary artery disease.  She is followed by Dr. Rivera.  Medication list has been reviewed.  She denies drug allergies.        Illness   Location:  Per HPI  Quality:  Per HPI  Severity:  Severe  Onset quality:  Gradual  Duration:  4 days  Timing:  Constant  Progression:  Worsening  Chronicity:  New  Context:  Per HPI  Relieved by:  Nothing  Worsened by:  Nothing  Ineffective treatments:  None  Associated symptoms: abdominal pain (low abd pressure), fatigue, fever and nausea    Associated symptoms: no cough, no diarrhea, no headaches, no rash, no shortness of breath, no vomiting and no wheezing        Review of Systems   Constitutional: Positive for chills, fatigue and fever.   Respiratory: Negative for cough, choking, chest tightness, shortness of breath and wheezing.    Gastrointestinal: Positive for abdominal pain (low abd pressure) and nausea. Negative for constipation, diarrhea and vomiting.   Genitourinary: Positive for decreased urine volume, dysuria and frequency. Negative for flank pain, hematuria, vaginal bleeding and vaginal discharge.   Skin: Negative for rash.   Neurological: Positive for weakness. Negative for dizziness, seizures, syncope, numbness and headaches.       Past Medical History:   Diagnosis Date   • Anxiety    • CAD (coronary artery disease)     • Depression    • GERD (gastroesophageal reflux disease)    • Hyperlipidemia    • Hypertension    • Obesity (BMI 30.0-34.9)        No Known Allergies    Past Surgical History:   Procedure Laterality Date   • CORONARY ANGIOPLASTY  10/18/2011   • EYE SURGERY     • ORIF HUMERUS FRACTURE     • SINUS SURGERY         Family History   Problem Relation Age of Onset   • Diabetes Mother    • Kidney failure Mother    • Heart attack Father    • Arthritis Sister    • Blindness Sister    • Breast cancer Neg Hx    • Ovarian cancer Neg Hx        Social History     Social History   • Marital status:      Social History Main Topics   • Smoking status: Never Smoker   • Smokeless tobacco: Never Used   • Alcohol use Defer   • Drug use: Unknown   • Sexual activity: Defer     Other Topics Concern   • Not on file           Objective   Physical Exam   Constitutional: She is oriented to person, place, and time. She appears well-developed and well-nourished. No distress.   Pleasant awake alert oriented, appears fatigued, does not appear to be toxic or in acute distress.  Normotensive   HENT:   Head: Normocephalic and atraumatic.   Right Ear: External ear normal.   Left Ear: External ear normal.   Nose: Nose normal.   Mouth/Throat: No oropharyngeal exudate.   Mucous membranes are dry Airways patent uvula is midline.   Eyes: Conjunctivae and EOM are normal. Pupils are equal, round, and reactive to light. Right eye exhibits no discharge. Left eye exhibits no discharge. No scleral icterus.   Neck: Normal range of motion. Neck supple. No JVD present. No tracheal deviation present. No thyromegaly present.   No meningismus   Cardiovascular: Normal rate, regular rhythm, normal heart sounds and intact distal pulses.  Exam reveals no gallop and no friction rub.    No murmur heard.  Pulmonary/Chest: Effort normal and breath sounds normal. No stridor. No respiratory distress. She has no rales.   Abdominal: Soft. Bowel sounds are normal. She  exhibits no distension and no mass. There is tenderness (Mild suprapubic and left lower quadrant tenderness with no guarding or rebound.  Bowel sounds are normal.  No palpable organomegaly or pulsatile masses.). There is no rebound and no guarding.   Musculoskeletal: Normal range of motion. She exhibits no edema, tenderness or deformity.   Neurological: She is alert and oriented to person, place, and time. No cranial nerve deficit. She exhibits normal muscle tone. Coordination normal.   Skin: Skin is warm and dry. No rash noted. She is not diaphoretic. No erythema. No pallor.   Skin is warm and dry with poor turgor, slightly pale.   Psychiatric: She has a normal mood and affect. Her behavior is normal. Judgment and thought content normal.   Nursing note and vitals reviewed.      Procedures        Recent Results (from the past 24 hour(s))   CBC Auto Differential    Collection Time: 05/23/18  7:47 PM   Result Value Ref Range    WBC 12.32 (H) 3.50 - 10.80 10*3/mm3    RBC 3.97 3.89 - 5.14 10*6/mm3    Hemoglobin 11.4 (L) 11.5 - 15.5 g/dL    Hematocrit 33.4 (L) 34.5 - 44.0 %    MCV 84.1 80.0 - 99.0 fL    MCH 28.7 27.0 - 31.0 pg    MCHC 34.1 32.0 - 36.0 g/dL    RDW 14.8 (H) 11.3 - 14.5 %    RDW-SD 46.2 37.0 - 54.0 fl    MPV 9.8 6.0 - 12.0 fL    Platelets 212 150 - 450 10*3/mm3    Neutrophil % 78.2 (H) 41.0 - 71.0 %    Lymphocyte % 11.1 (L) 24.0 - 44.0 %    Monocyte % 10.1 0.0 - 12.0 %    Eosinophil % 0.1 0.0 - 3.0 %    Basophil % 0.2 0.0 - 1.0 %    Immature Grans % 0.3 0.0 - 0.6 %    Neutrophils, Absolute 9.63 (H) 1.50 - 8.30 10*3/mm3    Lymphocytes, Absolute 1.37 0.60 - 4.80 10*3/mm3    Monocytes, Absolute 1.25 (H) 0.00 - 1.00 10*3/mm3    Eosinophils, Absolute 0.01 0.00 - 0.30 10*3/mm3    Basophils, Absolute 0.02 0.00 - 0.20 10*3/mm3    Immature Grans, Absolute 0.04 (H) 0.00 - 0.03 10*3/mm3   Lactic Acid, Plasma    Collection Time: 05/23/18  7:47 PM   Result Value Ref Range    Lactate 1.2 0.5 - 2.0 mmol/L   Comprehensive  Metabolic Panel    Collection Time: 05/23/18  7:48 PM   Result Value Ref Range    Glucose 136 (H) 70 - 100 mg/dL    BUN 24 (H) 9 - 23 mg/dL    Creatinine 1.80 (H) 0.60 - 1.30 mg/dL    Sodium 133 132 - 146 mmol/L    Potassium 3.1 (L) 3.5 - 5.5 mmol/L    Chloride 97 (L) 99 - 109 mmol/L    CO2 26.0 20.0 - 31.0 mmol/L    Calcium 9.1 8.7 - 10.4 mg/dL    Total Protein 6.8 5.7 - 8.2 g/dL    Albumin 3.70 3.20 - 4.80 g/dL    ALT (SGPT) 26 7 - 40 U/L    AST (SGOT) 37 (H) 0 - 33 U/L    Alkaline Phosphatase 131 (H) 25 - 100 U/L    Total Bilirubin 0.7 0.3 - 1.2 mg/dL    eGFR Non African Amer 28 (L) >60 mL/min/1.73    Globulin 3.1 gm/dL    A/G Ratio 1.2 (L) 1.5 - 2.5 g/dL    BUN/Creatinine Ratio 13.3 7.0 - 25.0    Anion Gap 10.0 3.0 - 11.0 mmol/L   CK    Collection Time: 05/23/18  7:48 PM   Result Value Ref Range    Creatine Kinase 332 (H) 26 - 174 U/L   Urinalysis With / Culture If Indicated - Urine, Catheter    Collection Time: 05/23/18  8:08 PM   Result Value Ref Range    Color, UA Yellow Yellow, Straw    Appearance, UA Turbid (A) Clear    pH, UA <=5.0 5.0 - 8.0    Specific Gravity, UA 1.015 1.001 - 1.030    Glucose, UA Negative Negative    Ketones, UA Negative Negative    Bilirubin, UA Negative Negative    Blood, UA Large (3+) (A) Negative    Protein,  mg/dL (2+) (A) Negative    Leuk Esterase, UA Large (3+) (A) Negative    Nitrite, UA Positive (A) Negative    Urobilinogen, UA 1.0 E.U./dL 0.2 - 1.0 E.U./dL   Urinalysis With / Microscopic If Indicated (No Culture) - Urine, Catheter    Collection Time: 05/23/18  8:08 PM   Result Value Ref Range    Color, UA Yellow Yellow, Straw    Appearance, UA Turbid (A) Clear    pH, UA <=5.0 5.0 - 8.0    Specific Gravity, UA 1.015 1.001 - 1.030    Glucose, UA Negative Negative    Ketones, UA Negative Negative    Bilirubin, UA Negative Negative    Blood, UA Large (3+) (A) Negative    Protein,  mg/dL (2+) (A) Negative    Leuk Esterase, UA Large (3+) (A) Negative    Nitrite, UA  Positive (A) Negative    Urobilinogen, UA 1.0 E.U./dL 0.2 - 1.0 E.U./dL   Urinalysis, Microscopic Only - Urine, Clean Catch    Collection Time: 05/23/18  8:08 PM   Result Value Ref Range    RBC, UA 3-6 (A) None Seen, 0-2 /HPF    WBC, UA Too Numerous to Count (A) None Seen, 0-2 /HPF    Bacteria, UA 4+ (A) None Seen, Trace /HPF    Squamous Epithelial Cells, UA 0-2 None Seen, 0-2 /HPF    Hyaline Casts, UA 7-12 0 - 6 /LPF    Granular Casts, UA 21-30 None Seen /LPF    Methodology Manual Light Microscopy      Note: In addition to lab results from this visit, the labs listed above may include labs taken at another facility or during a different encounter within the last 24 hours. Please correlate lab times with ED admission and discharge times for further clarification of the services performed during this visit.    XR Chest PA & Lateral    (Results Pending)     Vitals:    05/23/18 1915 05/23/18 1918 05/23/18 2030 05/23/18 2100   BP: 101/65  114/60 115/75   Patient Position:       Pulse:  92 86 86   Resp:       Temp:       TempSrc:       SpO2: 93% 94% 93% 94%   Weight:       Height:         Medications   sodium chloride 0.9 % bolus 1,000 mL (0 mL Intravenous Stopped 5/23/18 2059)   vancomycin 1500 mg/500 mL 0.9% NS IVPB ([facility]) (1,500 mg Intravenous New Bag 5/23/18 2059)   potassium chloride (MICRO-K) CR capsule 40 mEq (not administered)   sodium chloride 0.9 % infusion (not administered)   piperacillin-tazobactam (ZOSYN) 4.5 g/100 mL 0.9% NS IVPB (mbp) (0 g Intravenous Stopped 5/23/18 2058)   sodium chloride 0.9 % bolus 1,000 mL (0 mL Intravenous Stopped 5/23/18 2136)     ECG/EMG Results (last 24 hours)     ** No results found for the last 24 hours. **              ED Course  ED Course as of May 23 2137   Wed May 23, 2018   2007 WBC: (!) 12.32 [TG]   2007 Neutrophils, Absolute: (!) 9.63 [TG]   2047 Nitrite, UA: (!) Positive [TG]   2047 Nitrite, UA: (!) Positive [TG]   2047 Creatinine: (!) 1.80 [TG]   2047 Potassium: (!) 3.1  [TG]   2047 Chloride: (!) 97 [TG]   2047 eGFR Non  Amer: (!) 28 [TG]   2124 Bacteria, UA: (!) 4+ [TG]   2125 WBC, UA: (!) Too Numerous to Count [TG]   2137 Discussed with Dr. Maldonado, will admit to telemetry.  [TG]      ED Course User Index  [TG] Jose Sharp PA-C                  MDM      Final diagnoses:   Urinary tract infection without hematuria, site unspecified   Hypokalemia   Dehydration   Leukocytosis, unspecified type   Sepsis, due to unspecified organism            Jose Sharp PA-C  05/23/18 2137

## 2018-05-24 NOTE — PROGRESS NOTES
[facility] Medicine Services  PROGRESS NOTE    Patient Name: Nallely Mccabe  : 1950  MRN: [id number]    Date of Admission: 2018  Length of Stay: 1  Primary Care Physician: Shay Rivera MD    Subjective   Subjective     CC: F/U Pyelonephritis    HPI:  Still having some chills.  Overall a little better than yesterday.  Had a few episodes of loose stools.  Has not been on antibiotics prior to being here.  No history of C. Diff.    Review of Systems  Gen- + fevers, chills  CV- No chest pain, palpitations  Resp- No cough, dyspnea  GI- + D, no abd pain  - + Right flank pain    Otherwise ROS is negative except as mentioned in the HPI.    Objective   Objective     Vital Signs:   Temp:  [97.3 °F (36.3 °C)-99.8 °F (37.7 °C)] 98.6 °F (37 °C)  Heart Rate:  [68-99] 89  Resp:  [16-20] 20  BP: ()/(46-90) 141/81        Physical Exam:  Constitutional: No acute distress, awake, alert, laying in bed, shivering  HENT: NCAT, mucous membranes moist  Respiratory: Clear to auscultation bilaterally, respiratory effort normal   Cardiovascular: RRR, no murmurs, rubs, or gallops  Gastrointestinal: Positive bowel sounds, soft, nontender, nondistended  Musculoskeletal: No bilateral ankle edema  Psychiatric: Appropriate affect, cooperative  Neurologic: Cranial Nerves grossly intact to confrontation, speech clear  Skin: No rashes    Results Reviewed:  I have personally reviewed current lab, radiology, and data and agree.      Results from last 7 days  Lab Units 18  04218  194   WBC 10*3/mm3 9.10 12.32*   HEMOGLOBIN g/dL 9.9* 11.4*   HEMATOCRIT % 29.1* 33.4*   PLATELETS 10*3/mm3 186 212       Results from last 7 days  Lab Units 18  1349 18  0426 18   SODIUM mmol/L  --  137 133   POTASSIUM mmol/L 3.9 3.4* 3.1*   CHLORIDE mmol/L  --  107 97*   CO2 mmol/L  --  24.0 26.0   BUN mg/dL  --  22 24*   CREATININE mg/dL  --  1.70* 1.80*   GLUCOSE mg/dL  --  117* 136*    CALCIUM mg/dL  --  7.9* 9.1   ALT (SGPT) U/L  --   --  26   AST (SGOT) U/L  --   --  37*     Estimated Creatinine Clearance: 35.1 mL/min (A) (by C-G formula based on SCr of 1.7 mg/dL (H)).  No results found for: BNP  No results found for: PHART    Microbiology Results Abnormal     Procedure Component Value - Date/Time    Urine Culture - Urine, [[id number]]  (Abnormal) Collected:  05/23/18 2008    Lab Status:  Preliminary result Specimen:  Urine from Urine, Catheter Updated:  05/24/18 1010     Urine Culture >100,000 CFU/mL Gram Negative Bacilli (A)    Blood Culture - Blood, [[id number]]  (Normal) Collected:  05/1950    Lab Status:  Preliminary result Specimen:  Blood from Arm, Left Updated:  05/24/18 0816     Blood Culture No growth at less than 24 hours    Blood Culture - Blood, [[id number]]  (Normal) Collected:  05/23/18 1938    Lab Status:  Preliminary result Specimen:  Blood from Arm, Right Updated:  05/24/18 0800     Blood Culture No growth at less than 24 hours          Imaging Results (last 24 hours)     Procedure Component Value Units Date/Time    CT Abdomen Pelvis Without Contrast [[id number]] Collected:  05/23/18 2214     Updated:  05/23/18 2309    Narrative:       EXAM:    CT Abdomen and Pelvis Without Intravenous Contrast    CLINICAL HISTORY:    67 years, female; Sepsis, unspecified organism; Elevated white blood cell   count, unspecified; Dehydration; Hypokalemia; Urinary tract infection, site not   specified; Signs and symptoms; Other: See notes; Additional info: Flank pain,   recurrent stone disease suspected    TECHNIQUE:    Axial computed tomography images of the abdomen and pelvis without   intravenous contrast.  All CT scans at this facility use one or more dose   reduction techniques, viz.: automated exposure control; ma/kV adjustment per   patient size (including targeted exams where dose is matched to indication;   i.e. head); or iterative reconstruction technique.    Coronal reformatted  images were created and reviewed.    COMPARISON:    CT - AC ABD PELVIS WO CONTRAST 2015-09-24 11:30    FINDINGS:    Lung bases:  Minimal bibasilar atelectasis.  Small calcified granuloma in the   right lung base.     ABDOMEN:    Liver:  Unremarkable. No obvious liver masses appreciated on this   non-contrast exam.    Gallbladder and bile ducts:  Unremarkable.  No calcified stones.  No   significant biliary ductal dilatation.    Pancreas:  Unremarkable.  No ductal dilation.    Spleen:  Calcified granulomas noted in the spleen. No splenomegaly.    Adrenals:  Unremarkable.  No adrenal nodules or masses identified.    Kidneys and ureters:  There is bilateral perinephric stranding which is more   prominent on the right. There is also trace perinephric fluid on the right. No   significant hydronephrosis. No renal or ureteral stones visualized. No obvious   solid renal mass.    Stomach and bowel:  No evidence of bowel obstruction. No significant bowel   wall thickening visualized. Sigmoid diverticulosis is noted, without associated   inflammatory changes to suggest diverticulitis.     PELVIS:    Appendix:  The appendix is unremarkable.    Bladder:  Unremarkable.  No stones.    Reproductive:  Small uterine fibroids.     ABDOMEN and PELVIS:    Intraperitoneal space:  Unremarkable.  No free air.  No significant fluid   collection.    Bones/joints:  No acute fracture.  No dislocation.    Soft tissues:  No significant abnormalities in the superficial soft tissues.    Vasculature:  Atherosclerotic calcifications are noted. No aortic aneurysm.    Lymph nodes:  No significant lymph node enlargement.      Impression:       1.  Prominent right-sided perinephric stranding and trace perinephric fluid.   There is no hydronephrosis. No renal or ureteral stones. Findings may represent   pyelonephritis.  2.  Sigmoid diverticulosis without evidence of diverticulitis.    THIS DOCUMENT HAS BEEN ELECTRONICALLY SIGNED BY JACE HIDALGO  Chest PA & Lateral [[id number]] Collected:  05/23/18 2017     Updated:  05/23/18 2151    Narrative:       EXAM:    XR Chest, 2 Views    CLINICAL HISTORY:    67 years, female; Signs and symptoms; Fever and other: Weakness; Additional   info: Fever, sepsis, weakness    TECHNIQUE:    Frontal and lateral views of the chest.    COMPARISON:    CR - AX-CHEST PA AND LATERAL 2016-05-11 12:07    FINDINGS:    Lungs:  Small calcified granuloma projecting over right lower lung. The lungs   are otherwise clear. No consolidation.    Pleural space:  Unremarkable.  No pneumothorax.    Heart:  Unremarkable.  No cardiomegaly.    Mediastinum:  Unremarkable.    Bones/joints:  No acute osseous findings.      Impression:         No acute findings visualized in the chest.    THIS DOCUMENT HAS BEEN ELECTRONICALLY SIGNED BY JACE GARAY MD             I have reviewed the medications.    Assessment/Plan   Assessment / Plan     Hospital Problem List     * (Principal)Pyelonephritis, acute    Overview Signed 5/24/2018 12:59 AM by Fito Maldonado MD     Right sided         CAD (coronary artery disease)    Overview Signed 11/16/2016  3:10 PM by Qing Aguirre     a. ER presentation on 10/18/2011 with ACS/non-ST elevation MI.   b. Cardiac catheterization study by Dr. Chand on 10/18/2011 showed nonobstructive coronary artery disease with 30% plaque of the mid LAD, no other disease, severe LV dysfunction with EF 25% along with diastolic dysfunction.   c. Echocardiogram on 03/02/2012 showed normalization of LV systolic function, LVEF 50% to 55%.    d. Cardiolite stress test, 03/15/2013, showed excellent exercise capacity with normal perfusion scan and an ejection fraction of 58%.    e. A 24-hour Holter monitor, March 2014, showed normal sinus rhythm with occasional PACs and PVCs, no significant arrhythmias.    f. Nuclear perfusion study, 08/06/2015, Kandy Chand MD, revealed no evidence of reversible ischemia and a small mild fixed apical defect  consistent with soft tissue attenuation artifact.  Normal LVEF of 77%.         Hypertension    Hyperlipidemia    GERD (gastroesophageal reflux disease)    Anxiety    Sepsis    ANGIE (acute kidney injury)    Hypokalemia    Metabolic encephalopathy             Brief Hospital Course to date:  Nallely Mccabe is a 67 y.o. female with history of UTI's, CAD, depression/anxiety presented today after neighbor found patient on ground and confused. Patient was brought to er where workup revealed uti, creatinine 1.8, leukocytosis and was consfused.  CT abdomen/pelvis showed R pyelonephritis. Improved mentation with iv fluids.      Assessment & Plan:    Sepsis due to pyelonephritis  -Urine culture with GNR  -Blood cultures pending  -Continue ceftriaxone  -Continue IV fluids    Metabolic encephalopathy  -Improved with IV fluids    Loose stools  -Started after admission  -Start probiotic  -No abdominal pain; no evidence of diverticulitis on CT scan    ANGIE  -Improving  -Pre-renal due to hypovolemia/sepsis  -Continue IV fluids  -Hold HCTZ    DVT Prophylaxis:  SQ heparin    CODE STATUS: Full Code    Disposition: I expect the patient to be discharged TBD.      Electronically signed by Ann Marie Campo MD, 05/24/18, 4:29 PM.

## 2018-05-24 NOTE — H&P
[facility] Medicine Services  HISTORY AND PHYSICAL    Patient Name: Nallely Mccabe  : 1950  MRN: [id number]  Primary Care Physician: Shay Rivera MD    Subjective   Subjective     Chief Complaint:  Dysuria    HPI:  Nallely Mccabe is a 67 y.o. female with a history of HTN, HLD, GERD, CAD, and depression, presents to emergency department with a four-day history of progressively worsening fever, chills, abdominal pain, and dysuria.  Patient was found today in her home on the floor, she was confused and incontinent of urine, patient states she had been in the floor since this morning around 755 am when she fell and tripped getting out of bed.  She was found this evening around 4:30 pm. She does tell me that she had crawled around all day but per family member at bedside, when she was found she was hallucinating seeing dogs.  She is generally weak, with fever chills, nausea, low abdominal pain.  No cough chest congestion sputum hemoptysis LE swelling back pain.  No stiff neck vision changes or photophobia.  She has a history of hypertension hyperlipidemia GERD depression and coronary artery disease.  She is followed by Dr. Rivera.  Medication list has been reviewed.  She denies drug allergies.        Review of Systems   Constitutional: Positive for chills and fever. Negative for activity change, appetite change, diaphoresis, fatigue and unexpected weight change.   HENT: Negative.    Eyes: Negative.    Respiratory: Negative.    Cardiovascular: Negative.    Gastrointestinal: Positive for abdominal pain and nausea.   Endocrine: Negative.    Genitourinary: Positive for dysuria and flank pain. Negative for difficulty urinating, enuresis, frequency, hematuria and urgency.        Incontinent of urine   Skin: Negative.    Allergic/Immunologic: Negative.    Neurological: Negative.    Hematological: Negative.    Psychiatric/Behavioral: Positive for confusion.        Otherwise 10-system  ROS reviewed and is negative except as mentioned in the HPI.    Personal History     Past Medical History:   Diagnosis Date   • Anxiety    • CAD (coronary artery disease)    • Depression    • GERD (gastroesophageal reflux disease)    • Hyperlipidemia    • Hypertension    • Obesity (BMI 30.0-34.9)        Past Surgical History:   Procedure Laterality Date   • CORONARY ANGIOPLASTY  10/18/2011   • EYE SURGERY     • ORIF HUMERUS FRACTURE     • SINUS SURGERY         Family History: family history includes Arthritis in her sister; Blindness in her sister; Diabetes in her mother; Heart attack in her father; Kidney failure in her mother.     Social History:  reports that she has never smoked. She has never used smokeless tobacco. Alcohol use questions deferred to the physician. Drug use questions deferred to the physician.  Social History     Social History Narrative   • No narrative on file       Medications:    (Not in a hospital admission)    No Known Allergies    Objective   Objective     Vital Signs:   Temp:  [99.8 °F (37.7 °C)] 99.8 °F (37.7 °C)  Heart Rate:  [86-99] 86  Resp:  [16] 16  BP: (101-115)/(60-75) 115/75        Physical Exam   Constitutional: She is oriented to person, place, and time. She appears well-developed and well-nourished.   HENT:   Head: Normocephalic and atraumatic.   Dry mucous membranes   Eyes: EOM are normal. Pupils are equal, round, and reactive to light.   Neck: Normal range of motion. Neck supple.   Cardiovascular: Normal rate, regular rhythm, normal heart sounds and intact distal pulses.  Exam reveals no gallop and no friction rub.    No murmur heard.  Pulmonary/Chest: Effort normal and breath sounds normal. No respiratory distress. She has no wheezes. She has no rales. She exhibits no tenderness.   Abdominal: Soft. Bowel sounds are normal. She exhibits no distension and no mass. There is tenderness (LLQ, suprapubic tenderness). There is no rebound and no guarding. No hernia.   Right CVA  tenderness    Musculoskeletal: Normal range of motion. She exhibits no edema, tenderness or deformity.   Neurological: She is alert and oriented to person, place, and time. No cranial nerve deficit.   Skin: Skin is warm and dry. No rash noted. She is not diaphoretic. No erythema. No pallor.   Poor skin turgor   Psychiatric: She has a normal mood and affect. Her behavior is normal.        Results Reviewed:  I have personally reviewed current lab, radiology, and data and agree.      Results from last 7 days  Lab Units 05/23/18  1947   WBC 10*3/mm3 12.32*   HEMOGLOBIN g/dL 11.4*   HEMATOCRIT % 33.4*   PLATELETS 10*3/mm3 212       Results from last 7 days  Lab Units 05/23/18  1950   SODIUM mmol/L 133   POTASSIUM mmol/L 3.1*   CHLORIDE mmol/L 97*   CO2 mmol/L 26.0   BUN mg/dL 24*   CREATININE mg/dL 1.80*   GLUCOSE mg/dL 136*   CALCIUM mg/dL 9.1   ALT (SGPT) U/L 26   AST (SGOT) U/L 37*     Estimated Creatinine Clearance: 33.2 mL/min (A) (by C-G formula based on SCr of 1.8 mg/dL (H)).  Brief Urine Lab Results  (Last result in the past 365 days)      Color   Clarity   Blood   Leuk Est   Nitrite   Protein   CREAT   Urine HCG        05/23/18 2008 Yellow Turbid(A) Large (3+)(A) Large (3+)(A) Positive(A) 100 mg/dL (2+)(A)         05/23/18 2008 Yellow Turbid(A) Large (3+)(A) Large (3+)(A) Positive(A) 100 mg/dL (2+)(A)             No results found for: BNP  No results found for: PHART  Imaging Results (last 24 hours)     Procedure Component Value Units Date/Time    XR Chest PA & Lateral [[id number]] Updated:  05/23/18 2056             Assessment/Plan   Assessment / Plan     Hospital Problem List     * (Principal)Metabolic encephalopathy    Sepsis    UTI (urinary tract infection)    ANGIE (acute kidney injury)    Hypokalemia    CAD (coronary artery disease)    Overview Signed 11/16/2016  3:10 PM by Qing louis ER presentation on 10/18/2011 with ACS/non-ST elevation MI.   b. Cardiac catheterization study by Dr. Chand on  10/18/2011 showed nonobstructive coronary artery disease with 30% plaque of the mid LAD, no other disease, severe LV dysfunction with EF 25% along with diastolic dysfunction.   c. Echocardiogram on 03/02/2012 showed normalization of LV systolic function, LVEF 50% to 55%.    d. Cardiolite stress test, 03/15/2013, showed excellent exercise capacity with normal perfusion scan and an ejection fraction of 58%.    e. A 24-hour Holter monitor, March 2014, showed normal sinus rhythm with occasional PACs and PVCs, no significant arrhythmias.    f. Nuclear perfusion study, 08/06/2015, Kandy Chand MD, revealed no evidence of reversible ischemia and a small mild fixed apical defect consistent with soft tissue attenuation artifact.  Normal LVEF of 77%.         Hypertension    Hyperlipidemia    GERD (gastroesophageal reflux disease)    Anxiety    Depression            Assessment & Plan:    1.  Sepsis, poa  -received vanc and zosyn in the ed   -BC x 2   -urine culture   -Rocephin 2 grams q 24 hours   -IV fluids   -cbc, bmp in the am    2.  Metabolic encephalopathy   -neuro checks   -fall precautions    3.  PYelonphritis  -history of UTI with cultures + for E. coli and morganella morganii   -urine culture   -IV fluids   -Rocephin 2g q24 hours   -CT of the abdomen and pelvis w/ right perinephric stranding (no obstructing stone)    4.  ANGIE   -IV fluids   -urine studies   -hold HCTZ   -bmp in the am    5.  Hypokalemia   -sliding scale replacement   -bmp in the am    6.  HTN   -continue coreg    7.  CAD   -asa, plavix    8.  HLD   -continue pravastatin    9.  GERD    10.  Anxiety/depression   -continue home meds     DVT prophylaxis:  Heparin, teds, scuds    CODE STATUS:  No Order        Electronically signed by PARISA Pitts, 05/23/18, 9:44 PM.      Brief Attending Admission Attestation     I have seen and examined the patient, performing an independent face-to-face diagnostic evaluation with plan of care reviewed and  developed with the advanced practice clinician (APC).      Brief Summary Statement/HPI:   Nallely Mccabe is a 67 y.o. female with history of uti's, cad, depression/anxiety presented today after neighbor found patient on ground and confused. Patient was brought to er where workup revealed uti, creatinine 1.8, leukocytosis and was consfused. Improved mentation with iv fluids.       Attending Physical Exam:  Ill but nontoxic appearing, oriented x 3  Anxious affect, otherwise appropriate  Ncat, oroph clear  rrr  Lungs clear, equal   Right cva tenderness, obese abdomen, no anterior abdominal tenderness  No cce  No extremity rash  Face symmetric, speech clear, equal       Brief Assessment/Plan :  See above for further detailed assessment and plan developed with APC which I have reviewed and/or edited.    -s/p vanc and zosyn in ER. Change to rocephin  -follow cultures  -normal saline aggressive volume resuscitation  -replace potassium  -a.m. labs    Electronically signed by Fito Maldonado MD, 05/24/18, 1:03 AM.

## 2018-05-25 LAB
ANION GAP SERPL CALCULATED.3IONS-SCNC: 5 MMOL/L (ref 3–11)
BACTERIA SPEC AEROBE CULT: ABNORMAL
BUN BLD-MCNC: 13 MG/DL (ref 9–23)
BUN/CREAT SERPL: 8.7 (ref 7–25)
CALCIUM SPEC-SCNC: 7.9 MG/DL (ref 8.7–10.4)
CHLORIDE SERPL-SCNC: 112 MMOL/L (ref 99–109)
CO2 SERPL-SCNC: 22 MMOL/L (ref 20–31)
CREAT BLD-MCNC: 1.5 MG/DL (ref 0.6–1.3)
DEPRECATED RDW RBC AUTO: 49 FL (ref 37–54)
ERYTHROCYTE [DISTWIDTH] IN BLOOD BY AUTOMATED COUNT: 15.8 % (ref 11.3–14.5)
GFR SERPL CREATININE-BSD FRML MDRD: 34 ML/MIN/1.73
GLUCOSE BLD-MCNC: 117 MG/DL (ref 70–100)
HCT VFR BLD AUTO: 27.1 % (ref 34.5–44)
HGB BLD-MCNC: 9 G/DL (ref 11.5–15.5)
MCH RBC QN AUTO: 28.3 PG (ref 27–31)
MCHC RBC AUTO-ENTMCNC: 33.2 G/DL (ref 32–36)
MCV RBC AUTO: 85.2 FL (ref 80–99)
PLATELET # BLD AUTO: 196 10*3/MM3 (ref 150–450)
PMV BLD AUTO: 10.1 FL (ref 6–12)
POTASSIUM BLD-SCNC: 3.7 MMOL/L (ref 3.5–5.5)
RBC # BLD AUTO: 3.18 10*6/MM3 (ref 3.89–5.14)
SODIUM BLD-SCNC: 139 MMOL/L (ref 132–146)
WBC NRBC COR # BLD: 6.84 10*3/MM3 (ref 3.5–10.8)

## 2018-05-25 PROCEDURE — 25010000002 HEPARIN (PORCINE) PER 1000 UNITS: Performed by: INTERNAL MEDICINE

## 2018-05-25 PROCEDURE — 99232 SBSQ HOSP IP/OBS MODERATE 35: CPT | Performed by: INTERNAL MEDICINE

## 2018-05-25 PROCEDURE — 25010000002 CEFTRIAXONE PER 250 MG: Performed by: INTERNAL MEDICINE

## 2018-05-25 PROCEDURE — 25010000003 CEFTRIAXONE PER 250 MG: Performed by: INTERNAL MEDICINE

## 2018-05-25 PROCEDURE — 85027 COMPLETE CBC AUTOMATED: CPT | Performed by: INTERNAL MEDICINE

## 2018-05-25 PROCEDURE — 80048 BASIC METABOLIC PNL TOTAL CA: CPT | Performed by: INTERNAL MEDICINE

## 2018-05-25 RX ORDER — CEFTRIAXONE SODIUM 1 G/50ML
1 INJECTION, SOLUTION INTRAVENOUS EVERY 24 HOURS
Status: COMPLETED | OUTPATIENT
Start: 2018-05-25 | End: 2018-05-26

## 2018-05-25 RX ADMIN — ALPRAZOLAM 1 MG: 0.25 TABLET ORAL at 00:25

## 2018-05-25 RX ADMIN — ALPRAZOLAM 1 MG: 0.25 TABLET ORAL at 21:36

## 2018-05-25 RX ADMIN — HEPARIN SODIUM 5000 UNITS: 5000 INJECTION, SOLUTION INTRAVENOUS; SUBCUTANEOUS at 21:25

## 2018-05-25 RX ADMIN — CEFTRIAXONE SODIUM 1 G: 1 INJECTION, SOLUTION INTRAVENOUS at 21:25

## 2018-05-25 RX ADMIN — ACETAMINOPHEN 650 MG: 325 TABLET ORAL at 00:25

## 2018-05-25 RX ADMIN — CARVEDILOL 6.25 MG: 6.25 TABLET, FILM COATED ORAL at 08:33

## 2018-05-25 RX ADMIN — PANTOPRAZOLE SODIUM 40 MG: 40 TABLET, DELAYED RELEASE ORAL at 18:21

## 2018-05-25 RX ADMIN — ASPIRIN 325 MG: 325 TABLET, DELAYED RELEASE ORAL at 08:33

## 2018-05-25 RX ADMIN — POTASSIUM CHLORIDE 40 MEQ: 750 CAPSULE, EXTENDED RELEASE ORAL at 08:32

## 2018-05-25 RX ADMIN — ATORVASTATIN CALCIUM 10 MG: 10 TABLET, FILM COATED ORAL at 08:33

## 2018-05-25 RX ADMIN — PANTOPRAZOLE SODIUM 40 MG: 40 TABLET, DELAYED RELEASE ORAL at 08:33

## 2018-05-25 RX ADMIN — HEPARIN SODIUM 5000 UNITS: 5000 INJECTION, SOLUTION INTRAVENOUS; SUBCUTANEOUS at 08:34

## 2018-05-25 RX ADMIN — CARVEDILOL 6.25 MG: 6.25 TABLET, FILM COATED ORAL at 21:26

## 2018-05-25 RX ADMIN — MULTIPLE VITAMINS W/ MINERALS TAB 1 TABLET: TAB ORAL at 08:32

## 2018-05-25 RX ADMIN — CEFTRIAXONE SODIUM 2 G: 2 INJECTION, SOLUTION INTRAVENOUS at 00:15

## 2018-05-25 RX ADMIN — ACETAMINOPHEN 650 MG: 325 TABLET ORAL at 04:22

## 2018-05-25 RX ADMIN — BUPROPION HYDROCHLORIDE 150 MG: 150 TABLET, FILM COATED, EXTENDED RELEASE ORAL at 08:33

## 2018-05-25 RX ADMIN — CLOPIDOGREL BISULFATE 75 MG: 75 TABLET ORAL at 08:33

## 2018-05-25 RX ADMIN — ACETAMINOPHEN 650 MG: 325 TABLET ORAL at 21:36

## 2018-05-25 RX ADMIN — Medication 250 MG: at 21:26

## 2018-05-25 RX ADMIN — ACETAMINOPHEN 650 MG: 325 TABLET ORAL at 10:57

## 2018-05-25 RX ADMIN — Medication 250 MG: at 08:32

## 2018-05-25 NOTE — PLAN OF CARE
Problem: Fall Risk (Adult)  Goal: Absence of Fall  Outcome: Ongoing (interventions implemented as appropriate)   05/25/18 1448   Fall Risk (Adult)   Absence of Fall making progress toward outcome       Problem: Patient Care Overview  Goal: Plan of Care Review  Outcome: Ongoing (interventions implemented as appropriate)   05/25/18 1448   Coping/Psychosocial   Plan of Care Reviewed With patient;family   Plan of Care Review   Progress improving   OTHER   Outcome Summary VSS, alert and oriented, family at bedside,on room air, no complaints , ambulated well in hallway with no symptoms after 5/25/18 1451   Coping/Psychosocial   Patient Agreement with Plan of Care agrees     Goal: Discharge Needs Assessment  Outcome: Ongoing (interventions implemented as appropriate)   05/25/18 1448   Discharge Needs Assessment   Readmission Within the Last 30 Days no previous admission in last 30 days   Concerns to be Addressed denies needs/concerns at this time   Patient/Family Anticipates Transition to home   Patient/Family Anticipated Services at Transition none   Transportation Anticipated family or friend will provide       Problem: Confusion, Acute (Adult)  Goal: Safety   05/25/18 1448   Confusion, Acute (Adult)   Safety making progress toward outcome       Problem: Infection, Risk/Actual (Adult)  Goal: Infection Prevention/Resolution  Outcome: Ongoing (interventions implemented as appropriate)   05/25/18 1448   Infection, Risk/Actual (Adult)   Infection Prevention/Resolution making progress toward outcome       Problem: Patient Care Overview  Goal: Plan of Care Review  Outcome: Ongoing (interventions implemented as appropriate)   05/25/18 1400 05/25/18 1448   Coping/Psychosocial   Plan of Care Reviewed With patient;family --    Plan of Care Review   Progress --  improving   OTHER   Outcome Summary --  VSS, alert and oriented, family at bedside,on room air, no complaints , ambulated well in hallway with no symptoms after 5/25/18  1451     Goal: Discharge Needs Assessment   05/25/18 1448   Discharge Needs Assessment   Readmission Within the Last 30 Days no previous admission in last 30 days   Concerns to be Addressed denies needs/concerns at this time   Patient/Family Anticipates Transition to home   Patient/Family Anticipated Services at Transition none   Transportation Anticipated family or friend will provide   Anticipated Changes Related to Illness none   Equipment Needed After Discharge walker, rolling;ramp;commode   Disability   Equipment Currently Used at Home none

## 2018-05-25 NOTE — PLAN OF CARE
Problem: Fall Risk (Adult)  Goal: Absence of Fall  Outcome: Ongoing (interventions implemented as appropriate)   05/25/18 0412   Fall Risk (Adult)   Absence of Fall making progress toward outcome       Problem: Patient Care Overview  Goal: Plan of Care Review  Outcome: Ongoing (interventions implemented as appropriate)   05/24/18 0346 05/25/18 0025 05/25/18 0412   Coping/Psychosocial   Plan of Care Reviewed With --  patient --    Plan of Care Review   Progress no change --  --    Coping/Psychosocial   Patient Agreement with Plan of Care --  --  agrees     Goal: Individualization and Mutuality  Outcome: Ongoing (interventions implemented as appropriate)    Goal: Discharge Needs Assessment  Outcome: Ongoing (interventions implemented as appropriate)   05/24/18 0056 05/24/18 1052   Discharge Needs Assessment   Patient/Family Anticipates Transition to --  home   Patient/Family Anticipated Services at Transition none --    Transportation Anticipated --  family or friend will provide     Goal: Interprofessional Rounds/Family Conf  Outcome: Ongoing (interventions implemented as appropriate)      Problem: Confusion, Acute (Adult)  Goal: Safety  Outcome: Ongoing (interventions implemented as appropriate)   05/25/18 0412   Confusion, Acute (Adult)   Safety making progress toward outcome       Problem: Infection, Risk/Actual (Adult)  Goal: Infection Prevention/Resolution  Outcome: Ongoing (interventions implemented as appropriate)   05/25/18 0412   Infection, Risk/Actual (Adult)   Infection Prevention/Resolution making progress toward outcome

## 2018-05-25 NOTE — PROGRESS NOTES
[facility] Medicine Services  PROGRESS NOTE    Patient Name: Nallely Mccabe  : 1950  MRN: [id number]    Date of Admission: 2018  Length of Stay: 2  Primary Care Physician: Shay Rivera MD    Subjective   Subjective     CC: F/U Pyelonephritis    HPI:  Doing better today.  Has been out of bed a little.    Review of Systems  Gen- no fevers, chills  CV- No chest pain, palpitations  Resp- No cough, dyspnea  GI- No abd pain  - + Right flank pain improved    Otherwise ROS is negative except as mentioned in the HPI.    Objective   Objective     Vital Signs:   Temp:  [97.5 °F (36.4 °C)-98.6 °F (37 °C)] 97.5 °F (36.4 °C)  Heart Rate:  [77-81] 77  Resp:  [16-20] 18  BP: ()/(64-77) 116/75        Physical Exam:  Constitutional: No acute distress, awake, alert, sitting up in bed  HENT: NCAT, mucous membranes moist  Respiratory: Clear to auscultation bilaterally, respiratory effort normal   Cardiovascular: RRR, no murmurs, rubs, or gallops  Gastrointestinal: Positive bowel sounds, soft, nontender, nondistended  Musculoskeletal: No bilateral ankle edema  Psychiatric: Appropriate affect, cooperative  Neurologic: Cranial Nerves grossly intact to confrontation, speech clear  Skin: No rashes    Results Reviewed:  I have personally reviewed current lab, radiology, and data and agree.      Results from last 7 days  Lab Units 18   WBC 10*3/mm3 6.84 9.10 12.32*   HEMOGLOBIN g/dL 9.0* 9.9* 11.4*   HEMATOCRIT % 27.1* 29.1* 33.4*   PLATELETS 10*3/mm3 196 186 212       Results from last 7 days  Lab Units 18  1349 18   SODIUM mmol/L 139  --  137 133   POTASSIUM mmol/L 3.7 3.9 3.4* 3.1*   CHLORIDE mmol/L 112*  --  107 97*   CO2 mmol/L 22.0  --  24.0 26.0   BUN mg/dL 13  --  22 24*   CREATININE mg/dL 1.50*  --  1.70* 1.80*   GLUCOSE mg/dL 117*  --  117* 136*   CALCIUM mg/dL 7.9*  --  7.9* 9.1   ALT (SGPT)  U/L  --   --   --  26   AST (SGOT) U/L  --   --   --  37*     Estimated Creatinine Clearance: 39.7 mL/min (A) (by C-G formula based on SCr of 1.5 mg/dL (H)).  No results found for: BNP  No results found for: PHART    Microbiology Results Abnormal     Procedure Component Value - Date/Time    Urine Culture - Urine, [[id number]]  (Abnormal)  (Susceptibility) Collected:  05/23/18 2008    Lab Status:  Final result Specimen:  Urine from Urine, Catheter Updated:  05/25/18 1223     Urine Culture >100,000 CFU/mL Escherichia coli (A)    Susceptibility      Escherichia coli     YAW     Ampicillin <=8 ug/ml Susceptible     Ampicillin + Sulbactam <=8/4 ug/ml Susceptible     Aztreonam <=8 ug/ml Susceptible     Cefepime <=8 ug/ml Susceptible     Cefotaxime <=2 ug/ml Susceptible     Ceftriaxone <=8 ug/ml Susceptible     Cefuroxime sodium <=4 ug/ml Susceptible     Cephalothin <=8 ug/ml Susceptible     Ertapenem <=1 ug/ml Susceptible     Gentamicin <=4 ug/ml Susceptible     Levofloxacin <=2 ug/ml Susceptible     Meropenem <=1 ug/ml Susceptible     Nitrofurantoin <=32 ug/ml Susceptible     Piperacillin + Tazobactam <=16 ug/ml Susceptible     Tetracycline <=4 ug/ml Susceptible     Tobramycin <=4 ug/ml Susceptible     Trimethoprim + Sulfamethoxazole <=2/38 ug/ml Susceptible                    Blood Culture - Blood, [[id number]]  (Normal) Collected:  05/1950    Lab Status:  Preliminary result Specimen:  Blood from Arm, Left Updated:  05/24/18 2015     Blood Culture No growth at 24 hours    Blood Culture - Blood, [[id number]]  (Normal) Collected:  05/23/18 1938    Lab Status:  Preliminary result Specimen:  Blood from Arm, Right Updated:  05/24/18 2001     Blood Culture No growth at 24 hours          Imaging Results (last 24 hours)     ** No results found for the last 24 hours. **             I have reviewed the medications.    Assessment/Plan   Assessment / Plan     Hospital Problem List     * (Principal)Pyelonephritis, acute     Overview Signed 5/24/2018 12:59 AM by Fito Maldonado MD     Right sided         CAD (coronary artery disease)    Overview Signed 11/16/2016  3:10 PM by Qing Aguirre     a. ER presentation on 10/18/2011 with ACS/non-ST elevation MI.   b. Cardiac catheterization study by Dr. Chand on 10/18/2011 showed nonobstructive coronary artery disease with 30% plaque of the mid LAD, no other disease, severe LV dysfunction with EF 25% along with diastolic dysfunction.   c. Echocardiogram on 03/02/2012 showed normalization of LV systolic function, LVEF 50% to 55%.    d. Cardiolite stress test, 03/15/2013, showed excellent exercise capacity with normal perfusion scan and an ejection fraction of 58%.    e. A 24-hour Holter monitor, March 2014, showed normal sinus rhythm with occasional PACs and PVCs, no significant arrhythmias.    f. Nuclear perfusion study, 08/06/2015, Kandy Chand MD, revealed no evidence of reversible ischemia and a small mild fixed apical defect consistent with soft tissue attenuation artifact.  Normal LVEF of 77%.         Hypertension    Hyperlipidemia    GERD (gastroesophageal reflux disease)    Anxiety    Sepsis    ANGIE (acute kidney injury)    Hypokalemia    Metabolic encephalopathy             Brief Hospital Course to date:  Nallely Mccabe is a 67 y.o. female with history of UTI's, CAD, depression/anxiety presented today after neighbor found patient on ground and confused. Patient was brought to er where workup revealed uti, creatinine 1.8, leukocytosis and was consfused.  CT abdomen/pelvis showed R pyelonephritis. Improved mentation with iv fluids.      Assessment & Plan:    Sepsis due to E. Coli pyelonephritis  -Urine culture with sensitive E. coli  -Blood cultures NGTD  -Continue ceftriaxone x 5 days-decrease to 1g q24h with negative blood cultures.  May be able to change to PO tomorrow if ready to go home.    Metabolic encephalopathy  -Improved with IV fluids    Loose stools  -Improved  -Started  after admission  -Continue probiotic  -No abdominal pain; no evidence of diverticulitis on CT scan    ANGIE on CKD  -Improving-Cr. 1.3 in 2014 so suspect she is near her baseline now  -Pre-renal due to hypovolemia/sepsis  -Hold HCTZ    DVT Prophylaxis:  SQ heparin    CODE STATUS: Full Code    Disposition: I expect the patient to be discharged 1-2 days.  PT/OT consults placed for rehab needs assessment.      Electronically signed by Ann Marie Campo MD, 05/25/18, 1:33 PM.

## 2018-05-26 PROCEDURE — 97162 PT EVAL MOD COMPLEX 30 MIN: CPT

## 2018-05-26 PROCEDURE — 97166 OT EVAL MOD COMPLEX 45 MIN: CPT

## 2018-05-26 PROCEDURE — 99233 SBSQ HOSP IP/OBS HIGH 50: CPT | Performed by: INTERNAL MEDICINE

## 2018-05-26 PROCEDURE — 25010000002 CEFTRIAXONE PER 250 MG: Performed by: INTERNAL MEDICINE

## 2018-05-26 PROCEDURE — 25010000002 HEPARIN (PORCINE) PER 1000 UNITS: Performed by: INTERNAL MEDICINE

## 2018-05-26 RX ADMIN — PANTOPRAZOLE SODIUM 40 MG: 40 TABLET, DELAYED RELEASE ORAL at 08:44

## 2018-05-26 RX ADMIN — CEFTRIAXONE SODIUM 1 G: 1 INJECTION, SOLUTION INTRAVENOUS at 20:43

## 2018-05-26 RX ADMIN — PANTOPRAZOLE SODIUM 40 MG: 40 TABLET, DELAYED RELEASE ORAL at 18:58

## 2018-05-26 RX ADMIN — FLUTICASONE PROPIONATE 1 SPRAY: 50 SPRAY, METERED NASAL at 20:43

## 2018-05-26 RX ADMIN — Medication 250 MG: at 20:43

## 2018-05-26 RX ADMIN — BUPROPION HYDROCHLORIDE 150 MG: 150 TABLET, FILM COATED, EXTENDED RELEASE ORAL at 08:43

## 2018-05-26 RX ADMIN — HEPARIN SODIUM 5000 UNITS: 5000 INJECTION, SOLUTION INTRAVENOUS; SUBCUTANEOUS at 08:44

## 2018-05-26 RX ADMIN — CARVEDILOL 6.25 MG: 6.25 TABLET, FILM COATED ORAL at 20:45

## 2018-05-26 RX ADMIN — CARVEDILOL 6.25 MG: 6.25 TABLET, FILM COATED ORAL at 09:39

## 2018-05-26 RX ADMIN — CLOPIDOGREL BISULFATE 75 MG: 75 TABLET ORAL at 08:44

## 2018-05-26 RX ADMIN — ALPRAZOLAM 1 MG: 0.25 TABLET ORAL at 20:59

## 2018-05-26 RX ADMIN — MULTIPLE VITAMINS W/ MINERALS TAB 1 TABLET: TAB ORAL at 08:44

## 2018-05-26 RX ADMIN — HEPARIN SODIUM 5000 UNITS: 5000 INJECTION, SOLUTION INTRAVENOUS; SUBCUTANEOUS at 20:43

## 2018-05-26 RX ADMIN — ATORVASTATIN CALCIUM 10 MG: 10 TABLET, FILM COATED ORAL at 08:45

## 2018-05-26 RX ADMIN — ASPIRIN 325 MG: 325 TABLET, DELAYED RELEASE ORAL at 08:44

## 2018-05-26 RX ADMIN — POTASSIUM CHLORIDE 40 MEQ: 750 CAPSULE, EXTENDED RELEASE ORAL at 08:43

## 2018-05-26 RX ADMIN — ACETAMINOPHEN 650 MG: 325 TABLET ORAL at 20:59

## 2018-05-26 RX ADMIN — ACETAMINOPHEN 650 MG: 325 TABLET ORAL at 05:01

## 2018-05-26 RX ADMIN — Medication 250 MG: at 08:44

## 2018-05-26 NOTE — THERAPY DISCHARGE NOTE
Acute Care - Physical Therapy Initial Eval/Discharge  [facility]     Patient Name: Nallely Mccabe  : 1950  MRN: [id number]  Today's Date: 2018   Onset of Illness/Injury or Date of Surgery: 18  Date of Referral to PT: 18  Referring Physician: MD Alber      Admit Date: 2018    Visit Dx:    ICD-10-CM ICD-9-CM   1. Sepsis, due to unspecified organism A41.9 038.9     995.91   2. Urinary tract infection without hematuria, site unspecified N39.0 599.0   3. Hypokalemia E87.6 276.8   4. Dehydration E86.0 276.51   5. Leukocytosis, unspecified type D72.829 288.60   6. Impaired mobility and ADLs Z74.09 799.89   7. Impaired functional mobility, balance, gait, and endurance Z74.09 V49.89     Patient Active Problem List   Diagnosis   • CAD (coronary artery disease)   • Hypertension   • Hyperlipidemia   • GERD (gastroesophageal reflux disease)   • Anxiety   • Depression   • Obesity (BMI 30-39.9)   • Sepsis   • UTI (urinary tract infection)   • ANGIE (acute kidney injury)   • Hypokalemia   • Metabolic encephalopathy   • Pyelonephritis, acute     Past Medical History:   Diagnosis Date   • Anxiety    • CAD (coronary artery disease)    • Depression    • GERD (gastroesophageal reflux disease)    • Hyperlipidemia    • Hypertension    • Obesity (BMI 30.0-34.9)      Past Surgical History:   Procedure Laterality Date   • CORONARY ANGIOPLASTY  10/18/2011   • EYE SURGERY     • ORIF HUMERUS FRACTURE     • SINUS SURGERY            PT ASSESSMENT (last 12 hours)      Physical Therapy Evaluation     Row Name 18 1509          PT Evaluation Time/Intention    Subjective Information no complaints  -SJ     Document Type discharge evaluation/summary  -SJ     Mode of Treatment individual therapy;physical therapy  -SJ     Patient Effort good  -SJ     Symptoms Noted During/After Treatment none  -SJ     Row Name 18 2713          General Information    Patient Profile Reviewed? yes  -SJ     Onset of Illness/Injury or  Date of Surgery 05/23/18  -     Referring Physician MD Alber  -     Patient Observations alert;cooperative;agree to therapy  -     Patient/Family Observations pt supine in bed, awake  -     General Observations of Patient no family present  -     Prior Level of Function independent:;all household mobility;community mobility;gait;transfer;bed mobility;ADL's  -SJ     Equipment Currently Used at Home walker, rolling   new - hasn't used yet  -     Pertinent History of Current Functional Problem 67 y.o.F presented to ED with 4 day hx of worsening fever, chills, abd pain, dysuria, found down at home confused, incontinent, hallucinating  -     Existing Precautions/Restrictions fall  -SJ     Risks Reviewed patient:;LOB;increased discomfort  -     Benefits Reviewed patient:;improve function;increase independence  -     Barriers to Rehab none identified  -     Row Name 05/26/18 1509          Relationship/Environment    Lives With alone  -     Family Caregiver if Needed child(jacob), adult  -     Row Name 05/26/18 1509          Resource/Environmental Concerns    Current Living Arrangements home/apartment/condo  -     Row Name 05/26/18 1509          Home Main Entrance    Number of Stairs, Main Entrance four  -SJ     Stair Railings, Main Entrance railing on right side (ascending)  -     Row Name 05/26/18 1509          Cognitive Assessment/Intervention- PT/OT    Orientation Status (Cognition) oriented x 4  -SJ     Follows Commands (Cognition) WNL  -     Row Name 05/26/18 1509          Bed Mobility Assessment/Treatment    Bed Mobility Assessment/Treatment supine-sit-supine  -     Supine-Sit-Supine [place] (Bed Mobility) independent  -     Row Name 05/26/18 1509          Transfer Assessment/Treatment    Transfer Assessment/Treatment sit-stand transfer;stand-sit transfer  -     Sit-Stand [place] (Transfers) independent  -     Stand-Sit [place] (Transfers) independent  [facility]  Name 05/26/18 1509          Gait/Stairs Assessment/Training    Gait/Stairs Assessment/Training gait/ambulation independence  -     [place] Level (Gait) supervision  -     Distance in Feet (Gait) 400  -SJ     Pattern (Gait) swing-through  -SJ     Comment (Gait/Stairs) good safety awareness, no LOB  -SJ     Row Name 05/26/18 1509          General ROM    GENERAL ROM COMMENTS BLE's WNL  -SJ     Row Name 05/26/18 1509          General Assessment (Manual Muscle Testing)    Comment, General Manual Muscle Testing (MMT) Assessment BLE's 5/5  -SJ     Row Name 05/26/18 1509          Motor Assessment/Intervention    Additional Documentation Balance (Group)  -     Row Name 05/26/18 1509          Balance    Balance dynamic standing balance  -     Row Name 05/26/18 1509          Dynamic Standing Balance    Level of [place], Reaches Outside Midline (Standing, Dynamic Balance) contact guard assist  -     Row Name 05/26/18 1509          Pain Scale: Numbers Pre/Post-Treatment    Pain Scale: Numbers, Pretreatment 0/10 - no pain  -     Pain Scale: Numbers, Post-Treatment 0/10 - no pain  -     Row Name 05/26/18 1509          Coping    Observed Emotional State calm;cooperative  -     Verbalized Emotional State acceptance  -     Row Name 05/26/18 1509          Plan of Care Review    Plan of Care Reviewed With patient  -     Row Name 05/26/18 1509          Physical Therapy Clinical Impression    Date of Referral to PT 05/25/18  -     PT Diagnosis (PT Clinical Impression) impaired mobility  -     Patient/Family Goals Statement (PT Clinical Impression) return to home  -     Criteria for Skilled Interventions Met (PT Clinical Impression) yes;treatment indicated  -     Rehab Potential (PT Clinical Summary) good, to achieve stated therapy goals  -     Predicted Duration of Therapy (PT) 1x visit  -     Care Plan Review (PT) evaluation/treatment results reviewed;patient/other agree to care plan  -      Row Name 05/26/18 1509          Vital Signs    Pre Systolic BP Rehab 137  -SJ     Pre Treatment Diastolic BP 84  -SJ     Row Name 05/26/18 1509          Positioning and Restraints    Pre-Treatment Position in bed  -SJ     Post Treatment Position bed  -SJ     In Bed fowlers;call light within reach;encouraged to call for assist;exit alarm on  -     Row Name 05/26/18 1509          Living Environment    Home Accessibility stairs to enter home  -       User Key  (r) = Recorded By, (t) = Taken By, (c) = Cosigned By    Initials Name Provider Type    CITLALLI Torres PT Physical Therapist          Physical Therapy Education     Title: PT OT SLP Therapies (Done)     Topic: Physical Therapy (Done)     Point: Mobility training (Done)    Learning Progress Summary     Learner Status Readiness Method Response Comment Documented by    Patient Done Acceptance E PETER MO   05/26/18 5681          Point: Body mechanics (Done)    Learning Progress Summary     Learner Status Readiness Method Response Comment Documented by    Patient Done Acceptance E PETER MO   05/26/18 1544                      User Key     Initials Effective Dates Name Provider Type Discipline     06/19/15 -  Nannette Torres PT Physical Therapist PT                PT Recommendation and Plan  Anticipated Discharge Disposition (PT): home with assist  Therapy Frequency (PT Clinical Impression): evaluation only  Outcome Summary/Treatment Plan (PT)  Anticipated Discharge Disposition (PT): home with assist  Plan of Care Reviewed With: patient  Outcome Summary: PT eval completed. Pt dem 5/5 BLE strength, good safety and independence with mobility. Pt amb 400ft with supervision. Acute PT services not warranted at this time. PT recommends d/c home with assist.          Outcome Measures     Row Name 05/26/18 1509 05/26/18 1323          How much help from another person do you currently need...    Turning from your back to your side while in flat bed without using  bedrails? 4  -SJ  --     Moving from lying on back to sitting on the side of a flat bed without bedrails? 4  -SJ  --     Moving to and from a bed to a chair (including a wheelchair)? 4  -SJ  --     Standing up from a chair using your arms (e.g., wheelchair, bedside chair)? 4  -SJ  --     Climbing 3-5 steps with a railing? 4  -SJ  --     To walk in hospital room? 4  -SJ  --     AM-PAC 6 Clicks Score 24  -SJ  --        How much help from another is currently needed...    Putting on and taking off regular lower body clothing?  -- 4  -MM     Bathing (including washing, rinsing, and drying)  -- 4  -MM     Toileting (which includes using toilet bed pan or urinal)  -- 4  -MM     Putting on and taking off regular upper body clothing  -- 4  -MM     Taking care of personal grooming (such as brushing teeth)  -- 4  -MM     Eating meals  -- 4  -MM     Score  -- 24  -MM        Functional Assessment    Outcome Measure Options AM-PAC 6 Clicks Basic Mobility (PT)  - AM-PAC 6 Clicks Daily Activity (OT)  -MM       User Key  (r) = Recorded By, (t) = Taken By, (c) = Cosigned By    Initials Name Provider Type    CITLALLI Torres PT Physical Therapist    MM Varsha Cutler, OT Occupational Therapist           Time Calculation:         PT Charges     Row Name 05/26/18 1544             Time Calculation    Start Time 1509  -SJ      PT Received On 05/26/18  -      PT Goal Re-Cert Due Date 06/05/18  -        User Key  (r) = Recorded By, (t) = Taken By, (c) = Cosigned By    Initials Name Provider Type     Nannette Torres PT Physical Therapist          Therapy Charges for Today     Code Description Service Date Service Provider Modifiers Qty    82445176713 HC PT EVAL MOD COMPLEXITY 3 5/26/2018 Nannette Torres, PT GP 1          PT G-Codes  Outcome Measure Options: AM-PAC 6 Clicks Basic Mobility (PT)    PT Discharge Summary  Anticipated Discharge Disposition (PT): home with assist  Reason for Discharge: Independent    Nannette  Brian, PT  5/26/2018

## 2018-05-26 NOTE — PROGRESS NOTES
"    [facility] Medicine Services  PROGRESS NOTE    Patient Name: Nallely Mccabe  : 1950  MRN: [id number]    Date of Admission: 2018  Length of Stay: 3  Primary Care Physician: Shay Rivera MD    Subjective   Subjective     CC: f/u pyelonephritis    HPI: Up in chair without family at bedside. Overall feeling much better but \"not great.\" Has been ambulating. No new concerns.    Review of Systems  Gen- No fevers, chills  CV- No chest pain, palpitations  Resp- No cough, dyspnea  GI- No N/V/D, abd pain    Otherwise ROS is negative except as mentioned in the HPI.    Objective   Objective     Vital Signs:   Temp:  [97.2 °F (36.2 °C)-98.5 °F (36.9 °C)] 97.2 °F (36.2 °C)  Heart Rate:  [80-93] 80  Resp:  [18-20] 18  BP: (137-140)/(80-84) 137/84        Physical Exam:  Constitutional: No acute distress, awake, alert  HENT: NCAT, mucous membranes moist  Respiratory: Clear to auscultation bilaterally, respiratory effort normal   Cardiovascular: RRR, no murmurs, rubs, or gallops, palpable pedal pulses bilaterally  Gastrointestinal: Positive bowel sounds, soft, nontender, nondistended  Musculoskeletal: No bilateral ankle edema  Psychiatric: Appropriate affect, cooperative  Neurologic: Oriented x 3, strength symmetric in all extremities, Cranial Nerves grossly intact to confrontation, speech clear  Skin: No rashes    Results Reviewed:  I have personally reviewed current lab, radiology, and data and agree.      Results from last 7 days  Lab Units 18  03418   WBC 10*3/mm3 6.84 9.10 12.32*   HEMOGLOBIN g/dL 9.0* 9.9* 11.4*   HEMATOCRIT % 27.1* 29.1* 33.4*   PLATELETS 10*3/mm3 196 186 212       Results from last 7 days  Lab Units 18  0344 18  1349 18  0426 18   SODIUM mmol/L 139  --  137 133   POTASSIUM mmol/L 3.7 3.9 3.4* 3.1*   CHLORIDE mmol/L 112*  --  107 97*   CO2 mmol/L 22.0  --  24.0 26.0   BUN mg/dL 13  --  22 24* "   CREATININE mg/dL 1.50*  --  1.70* 1.80*   GLUCOSE mg/dL 117*  --  117* 136*   CALCIUM mg/dL 7.9*  --  7.9* 9.1   ALT (SGPT) U/L  --   --   --  26   AST (SGOT) U/L  --   --   --  37*     Estimated Creatinine Clearance: 39.7 mL/min (A) (by C-G formula based on SCr of 1.5 mg/dL (H)).  No results found for: BNP  No results found for: PHART    Microbiology Results Abnormal     Procedure Component Value - Date/Time    Blood Culture - Blood, [[id number]]  (Normal) Collected:  05/1950    Lab Status:  Preliminary result Specimen:  Blood from Arm, Left Updated:  05/25/18 2015     Blood Culture No growth at 2 days    Blood Culture - Blood, [[id number]]  (Normal) Collected:  05/23/18 1938    Lab Status:  Preliminary result Specimen:  Blood from Arm, Right Updated:  05/25/18 2000     Blood Culture No growth at 2 days    Urine Culture - Urine, [[id number]]  (Abnormal)  (Susceptibility) Collected:  05/23/18 2008    Lab Status:  Final result Specimen:  Urine from Urine, Catheter Updated:  05/25/18 1223     Urine Culture >100,000 CFU/mL Escherichia coli (A)    Susceptibility      Escherichia coli     YAW     Ampicillin <=8 ug/ml Susceptible     Ampicillin + Sulbactam <=8/4 ug/ml Susceptible     Aztreonam <=8 ug/ml Susceptible     Cefepime <=8 ug/ml Susceptible     Cefotaxime <=2 ug/ml Susceptible     Ceftriaxone <=8 ug/ml Susceptible     Cefuroxime sodium <=4 ug/ml Susceptible     Cephalothin <=8 ug/ml Susceptible     Ertapenem <=1 ug/ml Susceptible     Gentamicin <=4 ug/ml Susceptible     Levofloxacin <=2 ug/ml Susceptible     Meropenem <=1 ug/ml Susceptible     Nitrofurantoin <=32 ug/ml Susceptible     Piperacillin + Tazobactam <=16 ug/ml Susceptible     Tetracycline <=4 ug/ml Susceptible     Tobramycin <=4 ug/ml Susceptible     Trimethoprim + Sulfamethoxazole <=2/38 ug/ml Susceptible                          Personally reviewed CT A/P with right renal inflammatory changes. Agree with interpretation.        I have reviewed  the medications.    Assessment/Plan   Assessment / Plan     Hospital Problem List     * (Principal)Pyelonephritis, acute    Overview Signed 5/24/2018 12:59 AM by Fito Maldonado MD     Right sided         CAD (coronary artery disease)    Overview Signed 11/16/2016  3:10 PM by Qing Aguirre     a. ER presentation on 10/18/2011 with ACS/non-ST elevation MI.   b. Cardiac catheterization study by Dr. Chand on 10/18/2011 showed nonobstructive coronary artery disease with 30% plaque of the mid LAD, no other disease, severe LV dysfunction with EF 25% along with diastolic dysfunction.   c. Echocardiogram on 03/02/2012 showed normalization of LV systolic function, LVEF 50% to 55%.    d. Cardiolite stress test, 03/15/2013, showed excellent exercise capacity with normal perfusion scan and an ejection fraction of 58%.    e. A 24-hour Holter monitor, March 2014, showed normal sinus rhythm with occasional PACs and PVCs, no significant arrhythmias.    f. Nuclear perfusion study, 08/06/2015, Kandy Chand MD, revealed no evidence of reversible ischemia and a small mild fixed apical defect consistent with soft tissue attenuation artifact.  Normal LVEF of 77%.         Hypertension    Hyperlipidemia    GERD (gastroesophageal reflux disease)    Anxiety    Sepsis    ANGIE (acute kidney injury)    Hypokalemia    Metabolic encephalopathy             Brief Hospital Course to date:  Nallely Mccabe is a 67 y.o. female with history of UTI's, CAD, depression/anxiety presented today after neighbor found patient on ground and confused. Patient was brought to er where workup revealed uti, creatinine 1.8, leukocytosis and was consfused.  CT abdomen/pelvis showed R pyelonephritis. Improved mentation with iv fluids.     Assessment & Plan:     Sepsis due to E. Coli pyelonephritis  -Urine culture with sensitive E. coli  -Blood cultures NGTD  -Continue rocephin with plan for PO abx through 5/29. Likely levaquin at d/c given need for tissue  penetration with pyelo.     Metabolic encephalopathy  -Improved with IV fluids     Loose stools  -Improved  -Started after admission  -Continue probiotic  -No abdominal pain; no evidence of diverticulitis on CT scan     ANGIE on CKD  -Improving-Cr. 1.3 in 2014 so suspect she is near her baseline now  -Pre-renal due to hypovolemia/sepsis  -Hold HCTZ    PT/OT eval pending but likely home with assist at d/c.     DVT Prophylaxis:  SQ heparin     CODE STATUS: Full Code       Electronically signed by Zoie Santana II, DO, 05/26/18, 2:10 PM.

## 2018-05-26 NOTE — THERAPY DISCHARGE NOTE
Acute Care - Occupational Therapy Initial Eval/Discharge  [facility]     Patient Name: Nallely Mccabe  : 1950  MRN: [id number]  Today's Date: 2018  Onset of Illness/Injury or Date of Surgery: 18  Date of Referral to OT: 18  Referring Physician: Ann Marie Campo MD      Admit Date: 2018       ICD-10-CM ICD-9-CM   1. Sepsis, due to unspecified organism A41.9 038.9     995.91   2. Urinary tract infection without hematuria, site unspecified N39.0 599.0   3. Hypokalemia E87.6 276.8   4. Dehydration E86.0 276.51   5. Leukocytosis, unspecified type D72.829 288.60   6. Impaired mobility and ADLs Z74.09 799.89     Patient Active Problem List   Diagnosis   • CAD (coronary artery disease)   • Hypertension   • Hyperlipidemia   • GERD (gastroesophageal reflux disease)   • Anxiety   • Depression   • Obesity (BMI 30-39.9)   • Sepsis   • UTI (urinary tract infection)   • ANGIE (acute kidney injury)   • Hypokalemia   • Metabolic encephalopathy   • Pyelonephritis, acute     Past Medical History:   Diagnosis Date   • Anxiety    • CAD (coronary artery disease)    • Depression    • GERD (gastroesophageal reflux disease)    • Hyperlipidemia    • Hypertension    • Obesity (BMI 30.0-34.9)      Past Surgical History:   Procedure Laterality Date   • CORONARY ANGIOPLASTY  10/18/2011   • EYE SURGERY     • ORIF HUMERUS FRACTURE     • SINUS SURGERY            OT ASSESSMENT FLOWSHEET (last 72 hours)      Occupational Therapy Evaluation     Row Name 18 1325                   OT Evaluation Time/Intention    Subjective Information no complaints  -MM        Document Type discharge evaluation/summary  -MM        Mode of Treatment occupational therapy  -MM        Patient Effort excellent  -MM        Symptoms Noted During/After Treatment none  -MM           General Information    Patient Profile Reviewed? yes  -MM        Onset of Illness/Injury or Date of Surgery 18  -MM        Referring Physician Ann Marie Campo MD   -MM        Patient Observations alert;cooperative;agree to therapy  -MM        Patient/Family Observations Pt received UIC, on RA, alert and oriented, pleasant and agreeable to tx. Dtr present, RN cleared for IE/tx.  -MM        Prior Level of Function independent:;all household mobility;community mobility;gait;transfer;bed mobility;feeding;grooming;dressing;bathing;cooking;driving;shopping;min assist:;cleaning   Occasional assist with cleaning  -MM        Equipment Currently Used at Home --   Purchased Rwx and BSC after admission to [facility].  -MM        Pertinent History of Current Functional Problem Pt is 67yof presented to ED after fall on floor. Pt reports 4-day worsening fever, chills, abdominal pain and dysuria preceding fall on floor in which she remained from roughly 7:30a-4:30p. Dtr found in confused and hallucinating state. Admitted for polynephritis. PMH HTN, HLD, GERD, CAD, anxiety and depression.   -MM        Existing Precautions/Restrictions fall  -MM        Risks Reviewed patient and family:;LOB;dizziness;increased discomfort;change in vital signs  -MM        Benefits Reviewed patient and family:;improve function;increase independence;increase strength;increase balance  -MM        Barriers to Rehab none identified  -MM           Relationship/Environment    Lives With alone  -MM        Family Caregiver if Needed child(jacob), adult;other (see comments)   neighbors available PRN throughout day and night  -MM           Resource/Environmental Concerns    Current Living Arrangements home/apartment/condo  -MM        Resource/Environmental Concerns --   2-story home but bed/bath on first level  -MM           Home Main Entrance    Number of Stairs, Main Entrance four  -MM        Stair Railings, Main Entrance railing on right side (ascending)  -MM           Cognitive Assessment/Intervention- PT/OT    Orientation Status (Cognition) oriented x 4  -MM        Follows Commands (Cognition) WNL  -MM           Bed Mobility  Assessment/Treatment    Comment (Bed Mobility) Pt UIC  -MM           Functional Mobility    Functional Mobility- Ind. Level independent  -MM        Functional Mobility- Device --   No AD  -MM        Functional Mobility-Distance (Feet) 400  -MM           Transfer Assessment/Treatment    Transfer Assessment/Treatment sit-stand transfer;stand-sit transfer  -MM        Sit-Stand Stanton (Transfers) independent  -MM        Stand-Sit Stanton (Transfers) independent  -MM           Sit-Stand Transfer    Assistive Device (Sit-Stand Transfers) other (see comments)   No AD  -MM           Stand-Sit Transfer    Assistive Device (Stand-Sit Transfers) other (see comments)   No AD  -MM           ADL Assessment/Intervention    BADL Assessment/Intervention lower body dressing;grooming  -MM           Lower Body Dressing Assessment/Training    Lower Body Dressing Stanton Level doff;don;pants/bottoms;socks;independent  -MM        Lower Body Dressing Position unsupported sitting  -MM        Comment (Lower Body Dressing) Completed LBD with safe techniques and no assistance.  -MM           Grooming Assessment/Training    Stanton Level (Grooming) hair care, combing/brushing;independent  -MM        Grooming Position unsupported sitting  -MM           General ROM    GENERAL ROM COMMENTS BUE WNL except L shoulder FE to 90deg 2/2 previous injury  -MM           General Assessment (Manual Muscle Testing)    Comment, General Manual Muscle Testing (MMT) Assessment BUE MMT 5/5 except L shoulder 4/5 2/2 prev injury  -MM           Positioning and Restraints    Pre-Treatment Position sitting in chair/recliner  -MM        Post Treatment Position chair  -MM        In Chair notified nsg;sitting;call light within reach;encouraged to call for assist;with family/caregiver   RN in agreement that pt no longer warrants exit alarm.  -MM           Pain Assessment    Additional Documentation Pain Scale: Numbers Pre/Post-Treatment (Group)  -MM            Pain Scale: Numbers Pre/Post-Treatment    Pain Scale: Numbers, Pretreatment 0/10 - no pain  -MM        Pain Scale: Numbers, Post-Treatment 0/10 - no pain  -MM           Plan of Care Review    Plan of Care Reviewed With patient;daughter  -MM           Clinical Impression (OT)    Date of Referral to OT 05/25/18  -MM        OT Diagnosis Pt is independent with ADL, fnxl mobility and tsf. Acute OT not warranted at this time.  -MM        Criteria for Skilled Therapeutic Interventions Met (OT Eval) no problems identified which require skilled intervention  -MM        Therapy Frequency (OT Eval) evaluation only  -MM        Care Plan Review (OT) evaluation/treatment results reviewed;patient/other agree to care plan  -MM        Care Plan Review, Other Participant (OT Eval) daughter  -MM        Anticipated Discharge Disposition (OT) home  -MM           Living Environment    Home Accessibility stairs to enter home  -MM          User Key  (r) = Recorded By, (t) = Taken By, (c) = Cosigned By    Initials Name Effective Dates    MM Varsha Cutler, OT 06/22/15 -           Occupational Therapy Education     Title: PT OT SLP Therapies (Done)     Topic: Occupational Therapy (Done)     Point: Precautions (Done)     Description: Instruct learner(s) on prescribed precautions during self-care and functional transfers.   Learning Progress Summary     Learner Status Readiness Method Response Comment Documented by    Patient Done PETER Ny   05/26/18 1424    Family Done PETER Ny   05/26/18 1424                      User Key     Initials Effective Dates Name Provider Type Discipline     06/22/15 -  Varsha Cutler, OT Occupational Therapist OT                OT Recommendation and Plan  Outcome Summary/Treatment Plan (OT)  Anticipated Discharge Disposition (OT): home  Therapy Frequency (OT Eval): evaluation only  Plan of Care Review  Plan of Care Reviewed With: patient, daughter  Plan of Care Reviewed With:  patient, daughter  Outcome Summary: Pt independent with ADL, fnxl mobility and tsf. Acute OT not warranted at this time.               Outcome Measures     Row Name 05/26/18 1325             How much help from another is currently needed...    Putting on and taking off regular lower body clothing? 4  -MM      Bathing (including washing, rinsing, and drying) 4  -MM      Toileting (which includes using toilet bed pan or urinal) 4  -MM      Putting on and taking off regular upper body clothing 4  -MM      Taking care of personal grooming (such as brushing teeth) 4  -MM      Eating meals 4  -MM      Score 24  -MM         Functional Assessment    Outcome Measure Options AM-PAC 6 Clicks Daily Activity (OT)  -MM        User Key  (r) = Recorded By, (t) = Taken By, (c) = Cosigned By    Initials Name Provider Type    ELOY Cutler OT Occupational Therapist          Time Calculation:         Time Calculation- OT     Row Name 05/26/18 1426             Time Calculation- OT    OT Start Time 1325  -MM      OT Received On 05/26/18  -MM        User Key  (r) = Recorded By, (t) = Taken By, (c) = Cosigned By    Initials Name Provider Type    ELOY Cutler OT Occupational Therapist          Therapy Charges for Today     Code Description Service Date Service Provider Modifiers Qty    [id number]  OT EVAL MOD COMPLEXITY 3 5/26/2018 Varsha Cutler OT GO 1               OT Discharge Summary  Anticipated Discharge Disposition (OT): home  Reason for Discharge: Independent  Outcomes Achieved: Other (Pt evaluated to be independent ADL, fnxl mob, tsf. No OT warranted.)  Discharge Destination: Home    Varsha Cutler OT  5/26/2018

## 2018-05-26 NOTE — PLAN OF CARE
Problem: Fall Risk (Adult)  Goal: Absence of Fall  Outcome: Ongoing (interventions implemented as appropriate)   05/26/18 1630   Fall Risk (Adult)   Absence of Fall making progress toward outcome       Problem: Patient Care Overview  Goal: Plan of Care Review  Outcome: Ongoing (interventions implemented as appropriate)   05/26/18 1630   Coping/Psychosocial   Plan of Care Reviewed With patient   Plan of Care Review   Progress improving   Coping/Psychosocial   Patient Agreement with Plan of Care agrees       Problem: Infection, Risk/Actual (Adult)  Goal: Infection Prevention/Resolution  Outcome: Ongoing (interventions implemented as appropriate)   05/26/18 1630   Infection, Risk/Actual (Adult)   Infection Prevention/Resolution making progress toward outcome

## 2018-05-26 NOTE — PLAN OF CARE
Problem: Patient Care Overview  Goal: Plan of Care Review  Outcome: Ongoing (interventions implemented as appropriate)   05/26/18 6841   Coping/Psychosocial   Plan of Care Reviewed With patient   OTHER   Outcome Summary PT eval completed. Pt dem 5/5 BLE strength, good safety and independence with mobility. Pt amb 400ft with supervision. Acute PT services not warranted at this time. PT recommends d/c home with assist.

## 2018-05-26 NOTE — PLAN OF CARE
Problem: Patient Care Overview  Goal: Plan of Care Review   05/26/18 1422   Coping/Psychosocial   Plan of Care Reviewed With patient;daughter   Plan of Care Review   Progress no change   OTHER   Outcome Summary Pt independent with ADL, fnxl mobility and tsf. Acute OT not warranted at this time.

## 2018-05-27 VITALS
BODY MASS INDEX: 33.87 KG/M2 | HEART RATE: 86 BPM | TEMPERATURE: 97.6 F | RESPIRATION RATE: 18 BRPM | HEIGHT: 65 IN | OXYGEN SATURATION: 97 % | SYSTOLIC BLOOD PRESSURE: 137 MMHG | DIASTOLIC BLOOD PRESSURE: 95 MMHG | WEIGHT: 203.26 LBS

## 2018-05-27 LAB
ANION GAP SERPL CALCULATED.3IONS-SCNC: 7 MMOL/L (ref 3–11)
BUN BLD-MCNC: 7 MG/DL (ref 9–23)
BUN/CREAT SERPL: 5.4 (ref 7–25)
CALCIUM SPEC-SCNC: 9.2 MG/DL (ref 8.7–10.4)
CHLORIDE SERPL-SCNC: 107 MMOL/L (ref 99–109)
CO2 SERPL-SCNC: 25 MMOL/L (ref 20–31)
CREAT BLD-MCNC: 1.3 MG/DL (ref 0.6–1.3)
GFR SERPL CREATININE-BSD FRML MDRD: 41 ML/MIN/1.73
GLUCOSE BLD-MCNC: 102 MG/DL (ref 70–100)
POTASSIUM BLD-SCNC: 4 MMOL/L (ref 3.5–5.5)
SODIUM BLD-SCNC: 139 MMOL/L (ref 132–146)

## 2018-05-27 PROCEDURE — 80048 BASIC METABOLIC PNL TOTAL CA: CPT | Performed by: INTERNAL MEDICINE

## 2018-05-27 PROCEDURE — 99239 HOSP IP/OBS DSCHRG MGMT >30: CPT | Performed by: INTERNAL MEDICINE

## 2018-05-27 PROCEDURE — 25010000002 HEPARIN (PORCINE) PER 1000 UNITS: Performed by: INTERNAL MEDICINE

## 2018-05-27 RX ORDER — LEVOFLOXACIN 500 MG/1
500 TABLET, FILM COATED ORAL ONCE
Qty: 1 TABLET | Refills: 0 | Status: SHIPPED | OUTPATIENT
Start: 2018-05-27 | End: 2018-05-27

## 2018-05-27 RX ORDER — SACCHAROMYCES BOULARDII 250 MG
250 CAPSULE ORAL 2 TIMES DAILY
Qty: 10 CAPSULE | Refills: 0 | Status: SHIPPED | OUTPATIENT
Start: 2018-05-27 | End: 2019-04-26

## 2018-05-27 RX ORDER — LEVOFLOXACIN 250 MG/1
250 TABLET ORAL DAILY
Qty: 2 TABLET | Refills: 0 | Status: SHIPPED | OUTPATIENT
Start: 2018-05-28 | End: 2018-05-30

## 2018-05-27 RX ADMIN — HEPARIN SODIUM 5000 UNITS: 5000 INJECTION, SOLUTION INTRAVENOUS; SUBCUTANEOUS at 08:02

## 2018-05-27 RX ADMIN — CLOPIDOGREL BISULFATE 75 MG: 75 TABLET ORAL at 08:01

## 2018-05-27 RX ADMIN — ACETAMINOPHEN 650 MG: 325 TABLET ORAL at 03:42

## 2018-05-27 RX ADMIN — CARVEDILOL 6.25 MG: 6.25 TABLET, FILM COATED ORAL at 08:01

## 2018-05-27 RX ADMIN — ASPIRIN 325 MG: 325 TABLET, DELAYED RELEASE ORAL at 08:02

## 2018-05-27 RX ADMIN — BUPROPION HYDROCHLORIDE 150 MG: 150 TABLET, FILM COATED, EXTENDED RELEASE ORAL at 08:01

## 2018-05-27 RX ADMIN — ACETAMINOPHEN 650 MG: 325 TABLET ORAL at 07:49

## 2018-05-27 RX ADMIN — PANTOPRAZOLE SODIUM 40 MG: 40 TABLET, DELAYED RELEASE ORAL at 07:50

## 2018-05-27 RX ADMIN — Medication 250 MG: at 08:02

## 2018-05-27 RX ADMIN — MULTIPLE VITAMINS W/ MINERALS TAB 1 TABLET: TAB ORAL at 08:01

## 2018-05-27 RX ADMIN — POTASSIUM CHLORIDE 40 MEQ: 750 CAPSULE, EXTENDED RELEASE ORAL at 08:02

## 2018-05-27 NOTE — DISCHARGE SUMMARY
[facility] Medicine Services  DISCHARGE SUMMARY    Patient Name: Nallely Mccabe  : 1950  MRN: [id number]    Date of Admission: 2018  Date of Discharge: 2018  Primary Care Physician: Shay Rivera MD    Consults     No orders found from 2018 to 2018.        Hospital Course     Presenting Problem:   Sepsis, due to unspecified organism [A41.9]    Active Hospital Problems (** Indicates Principal Problem)    Diagnosis Date Noted   • **Pyelonephritis, acute [N10] 2018   • ANGIE (acute kidney injury) [N17.9] 2018   • Hypokalemia [E87.6] 2018   • Metabolic encephalopathy [G93.41] 2018   • Sepsis [A41.9] 2018   • CAD (coronary artery disease) [I25.10]    • Hypertension [I10]    • Hyperlipidemia [E78.5]    • GERD (gastroesophageal reflux disease) [K21.9]    • Anxiety [F41.9]       Resolved Hospital Problems    Diagnosis Date Noted Date Resolved   No resolved problems to display.          Hospital Course:  Nallely Mccabe is a 67 y.o. female who presented with confusion found to have sepsis due to pyelonephritis.    Sepsis w/ metabolic encephalopathy due to E. Coli pyelonephritis: 68 y/o female presenting with fever and confusion. Urinalysis on arrival confirmed infection. Due to the severity of her illness she underwent CT of the abdomen and pelvis which showed right sided pyelonephritis. She was hydrated with IV fluids and placed initially on broad spectrum antibiotics. Her mental status quickly returned to baseline with treatment of her underlying infection. Her urine culture ultimately grew pan-sensitive E. Coli. She will continue renally dosed levaquin for 3 more days upon d/c. She will follow up with her PCP in 1 week with BMP at that time.     ANGIE: Upon arrival, patient noted to have serum creatinine of 1.8 from prior baseline of 1.3. This was thought to be due to sepsis and volume depletion in setting of multiple nephrotoxins. She  "was hydrated as above and her creatinine did return to baseline at time of d/c. She will continue to hold her renal toxins upon d/c including mobic and HCTZ. These can be resumed at the discretion of her PCP. BMP has been ordered for 1 week.            Day of Discharge     HPI: Up walking around room. \"Feels great.\" No new complaints. Wants to go home.     Review of Systems  Gen- No fevers, chills  CV- No chest pain, palpitations  Resp- No cough, dyspnea  GI- No N/V/D, abd pain    Otherwise ROS is negative except as mentioned in the HPI.    Vital Signs:   Temp:  [97.6 °F (36.4 °C)-98 °F (36.7 °C)] 97.6 °F (36.4 °C)  Heart Rate:  [85-86] 86  Resp:  [18] 18  BP: (129-137)/(87-95) 137/95     Physical Exam:  Constitutional: No acute distress, awake, alert  HENT: NCAT, mucous membranes moist  Respiratory: Clear to auscultation bilaterally, respiratory effort normal   Cardiovascular: RRR, no murmurs, rubs, or gallops, palpable pedal pulses bilaterally  Gastrointestinal: Positive bowel sounds, soft, nontender, nondistended  Musculoskeletal: No bilateral ankle edema  Psychiatric: Appropriate affect, cooperative  Neurologic: Oriented x 3, strength symmetric in all extremities, Cranial Nerves grossly intact to confrontation, speech clear  Skin: No rashes    Pertinent  and/or Most Recent Results       Results from last 7 days  Lab Units 05/27/18  0836 05/25/18  0344 05/24/18  1349 05/24/18  0426 05/23/18  1950/18  1949   WBC 10*3/mm3  --  6.84  --  9.10  --  12.32*   HEMOGLOBIN g/dL  --  9.0*  --  9.9*  --  11.4*   HEMATOCRIT %  --  27.1*  --  29.1*  --  33.4*   PLATELETS 10*3/mm3  --  196  --  186  --  212   SODIUM mmol/L 139 139  --  137 133  --    POTASSIUM mmol/L 4.0 3.7 3.9 3.4* 3.1*  --    CHLORIDE mmol/L 107 112*  --  107 97*  --    CO2 mmol/L 25.0 22.0  --  24.0 26.0  --    BUN mg/dL 7* 13  --  22 24*  --    CREATININE mg/dL 1.30 1.50*  --  1.70* 1.80*  --    GLUCOSE mg/dL 102* 117*  --  117* 136*  --    CALCIUM " mg/dL 9.2 7.9*  --  7.9* 9.1  --        Results from last 7 days  Lab Units 05/1950   BILIRUBIN mg/dL 0.7   ALK PHOS U/L 131*   ALT (SGPT) U/L 26   AST (SGOT) U/L 37*           Invalid input(s): TG, LDLCALC, LDLREALC      Brief Urine Lab Results  (Last result in the past 365 days)      Color   Clarity   Blood   Leuk Est   Nitrite   Protein   CREAT   Urine HCG        05/23/18 2008 Yellow Turbid(A) Large (3+)(A) Large (3+)(A) Positive(A) 100 mg/dL (2+)(A)         05/23/18 2008 Yellow Turbid(A) Large (3+)(A) Large (3+)(A) Positive(A) 100 mg/dL (2+)(A)               Microbiology Results Abnormal     Procedure Component Value - Date/Time    Blood Culture - Blood, [[id number]]  (Normal) Collected:  05/1950    Lab Status:  Preliminary result Specimen:  Blood from Arm, Left Updated:  05/26/18 2015     Blood Culture No growth at 3 days    Blood Culture - Blood, [[id number]]  (Normal) Collected:  05/23/18 1938    Lab Status:  Preliminary result Specimen:  Blood from Arm, Right Updated:  05/26/18 2000     Blood Culture No growth at 3 days    Urine Culture - Urine, [[id number]]  (Abnormal)  (Susceptibility) Collected:  05/23/18 2008    Lab Status:  Final result Specimen:  Urine from Urine, Catheter Updated:  05/25/18 1223     Urine Culture >100,000 CFU/mL Escherichia coli (A)    Susceptibility      Escherichia coli     YAW     Ampicillin <=8 ug/ml Susceptible     Ampicillin + Sulbactam <=8/4 ug/ml Susceptible     Aztreonam <=8 ug/ml Susceptible     Cefepime <=8 ug/ml Susceptible     Cefotaxime <=2 ug/ml Susceptible     Ceftriaxone <=8 ug/ml Susceptible     Cefuroxime sodium <=4 ug/ml Susceptible     Cephalothin <=8 ug/ml Susceptible     Ertapenem <=1 ug/ml Susceptible     Gentamicin <=4 ug/ml Susceptible     Levofloxacin <=2 ug/ml Susceptible     Meropenem <=1 ug/ml Susceptible     Nitrofurantoin <=32 ug/ml Susceptible     Piperacillin + Tazobactam <=16 ug/ml Susceptible     Tetracycline <=4 ug/ml Susceptible      Tobramycin <=4 ug/ml Susceptible     Trimethoprim + Sulfamethoxazole <=2/38 ug/ml Susceptible                          Imaging Results (all)     Procedure Component Value Units Date/Time    CT Abdomen Pelvis Without Contrast [[id number]] Collected:  05/23/18 2214     Updated:  05/23/18 2309    Narrative:       EXAM:    CT Abdomen and Pelvis Without Intravenous Contrast    CLINICAL HISTORY:    67 years, female; Sepsis, unspecified organism; Elevated white blood cell   count, unspecified; Dehydration; Hypokalemia; Urinary tract infection, site not   specified; Signs and symptoms; Other: See notes; Additional info: Flank pain,   recurrent stone disease suspected    TECHNIQUE:    Axial computed tomography images of the abdomen and pelvis without   intravenous contrast.  All CT scans at this facility use one or more dose   reduction techniques, viz.: automated exposure control; ma/kV adjustment per   patient size (including targeted exams where dose is matched to indication;   i.e. head); or iterative reconstruction technique.    Coronal reformatted images were created and reviewed.    COMPARISON:    CT - AC ABD PELVIS WO CONTRAST 2015-09-24 11:30    FINDINGS:    Lung bases:  Minimal bibasilar atelectasis.  Small calcified granuloma in the   right lung base.     ABDOMEN:    Liver:  Unremarkable. No obvious liver masses appreciated on this   non-contrast exam.    Gallbladder and bile ducts:  Unremarkable.  No calcified stones.  No   significant biliary ductal dilatation.    Pancreas:  Unremarkable.  No ductal dilation.    Spleen:  Calcified granulomas noted in the spleen. No splenomegaly.    Adrenals:  Unremarkable.  No adrenal nodules or masses identified.    Kidneys and ureters:  There is bilateral perinephric stranding which is more   prominent on the right. There is also trace perinephric fluid on the right. No   significant hydronephrosis. No renal or ureteral stones visualized. No obvious   solid renal mass.     Stomach and bowel:  No evidence of bowel obstruction. No significant bowel   wall thickening visualized. Sigmoid diverticulosis is noted, without associated   inflammatory changes to suggest diverticulitis.     PELVIS:    Appendix:  The appendix is unremarkable.    Bladder:  Unremarkable.  No stones.    Reproductive:  Small uterine fibroids.     ABDOMEN and PELVIS:    Intraperitoneal space:  Unremarkable.  No free air.  No significant fluid   collection.    Bones/joints:  No acute fracture.  No dislocation.    Soft tissues:  No significant abnormalities in the superficial soft tissues.    Vasculature:  Atherosclerotic calcifications are noted. No aortic aneurysm.    Lymph nodes:  No significant lymph node enlargement.      Impression:       1.  Prominent right-sided perinephric stranding and trace perinephric fluid.   There is no hydronephrosis. No renal or ureteral stones. Findings may represent   pyelonephritis.  2.  Sigmoid diverticulosis without evidence of diverticulitis.    THIS DOCUMENT HAS BEEN ELECTRONICALLY SIGNED BY JACE GARAY MD    XR Chest PA & Lateral [[id number]] Collected:  05/23/18 2017     Updated:  05/23/18 2151    Narrative:       EXAM:    XR Chest, 2 Views    CLINICAL HISTORY:    67 years, female; Signs and symptoms; Fever and other: Weakness; Additional   info: Fever, sepsis, weakness    TECHNIQUE:    Frontal and lateral views of the chest.    COMPARISON:    CR - AX-CHEST PA AND LATERAL 2016-05-11 12:07    FINDINGS:    Lungs:  Small calcified granuloma projecting over right lower lung. The lungs   are otherwise clear. No consolidation.    Pleural space:  Unremarkable.  No pneumothorax.    Heart:  Unremarkable.  No cardiomegaly.    Mediastinum:  Unremarkable.    Bones/joints:  No acute osseous findings.      Impression:         No acute findings visualized in the chest.    THIS DOCUMENT HAS BEEN ELECTRONICALLY SIGNED BY JACE GARAY MD                          Order Current Status    Blood Culture -  Blood, Preliminary result    Blood Culture - Blood, Preliminary result        Discharge Details      Johnathan Mccabeclement VELOZ   Home Medication Instructions LAURIE:[id number]    Printed on:05/27/18 6264   Medication Information                      acetaminophen (TYLENOL) 500 MG tablet  Take 500 mg by mouth As Needed for mild pain (1-3).             ALPRAZolam (XANAX) 1 MG tablet  3 (Three) Times a Day As Needed.             aspirin  MG tablet  Take 325 mg by mouth Daily.             benzonatate (TESSALON) 100 MG capsule  As Needed.             buPROPion XL (WELLBUTRIN XL) 150 MG 24 hr tablet  Daily.             carboxymethylcellulose (REFRESH TEARS) 0.5 % solution  2 drops 3 (Three) Times a Day As Needed for Dry Eyes.             carvedilol (COREG) 12.5 MG tablet  take 1 tablet by mouth twice a day             clopidogrel (PLAVIX) 75 MG tablet  take 1 tablet by mouth once daily             DEXILANT 60 MG capsule  Daily.             diclofenac (VOLTAREN) 1 % gel gel  Apply 4 g topically As Needed.             dicyclomine (BENTYL) 10 MG capsule  As Needed.             fluticasone (FLONASE) 50 MCG/ACT nasal spray  Daily.             levoFLOXacin (LEVAQUIN) 250 MG tablet  Take 1 tablet by mouth Daily for 2 days.             levoFLOXacin (LEVAQUIN) 500 MG tablet  Take 1 tablet by mouth 1 (One) Time for 1 dose.             meclizine (ANTIVERT) 25 MG tablet  As Needed.             MEGARED OMEGA-3 KRILL  MG capsule  Take 1,000 mg by mouth Daily.             Multiple Vitamins-Minerals (PRESERVISION AREDS 2 PO)  Take  by mouth 2 (Two) Times a Day.             nitroglycerin (NITROSTAT) 0.4 MG SL tablet  Place 0.4 mg under the tongue As Needed for chest pain. Take no more than 3 doses in 15 minutes.             ondansetron (ZOFRAN) 4 MG tablet  take 1 tablet by mouth four times a day if needed for nausea             polyethylene glycol (MIRALAX) powder  Daily.             pravastatin (PRAVACHOL) 20 MG tablet  take 1 tablet  by mouth at bedtime             saccharomyces boulardii (FLORASTOR) 250 MG capsule  Take 1 capsule by mouth 2 (Two) Times a Day.                   Discharge Disposition:  Home or Self Care    Discharge Diet: Cardiac      Discharge Activity: As tolerated      Special Instructions:  Follow up with your PCP in 1 week with labs at that time.    Future Appointments  Date Time Provider Department Center   3/15/2019 12:45 PM Kandy Chand MD [facility] None       Additional Instructions for the Follow-ups that You Need to Schedule     Discharge Follow-up with PCP    As directed      Follow Up Details:  1 week         Basic Metabolic Panel     Jun 01, 2018 (Approximate)            Time Spent on Discharge: 41 minutes    Electronically signed by Zoie Santana II, DO, 05/27/18, 10:52 AM.

## 2018-05-27 NOTE — PLAN OF CARE
Problem: Fall Risk (Adult)  Goal: Absence of Fall  Outcome: Ongoing (interventions implemented as appropriate)   05/27/18 0535   Fall Risk (Adult)   Absence of Fall making progress toward outcome       Problem: Confusion, Acute (Adult)  Goal: Safety  Outcome: Ongoing (interventions implemented as appropriate)   05/27/18 0535   Confusion, Acute (Adult)   Safety making progress toward outcome       Problem: Infection, Risk/Actual (Adult)  Goal: Infection Prevention/Resolution  Outcome: Ongoing (interventions implemented as appropriate)   05/27/18 0535   Infection, Risk/Actual (Adult)   Infection Prevention/Resolution making progress toward outcome       Problem: Patient Care Overview  Goal: Plan of Care Review  Outcome: Ongoing (interventions implemented as appropriate)   05/26/18 1630 05/27/18 0342   Coping/Psychosocial   Plan of Care Reviewed With --  patient   Plan of Care Review   Progress improving --      Goal: Individualization and Mutuality  Outcome: Ongoing (interventions implemented as appropriate)    Goal: Discharge Needs Assessment  Outcome: Ongoing (interventions implemented as appropriate)   05/25/18 1448   Discharge Needs Assessment   Readmission Within the Last 30 Days no previous admission in last 30 days   Concerns to be Addressed denies needs/concerns at this time   Patient/Family Anticipates Transition to home   Patient/Family Anticipated Services at Transition none   Transportation Anticipated family or friend will provide   Anticipated Changes Related to Illness none   Equipment Needed After Discharge walker, rolling;ramp;commode   Disability   Equipment Currently Used at Home none     Goal: Interprofessional Rounds/Family Conf  Outcome: Ongoing (interventions implemented as appropriate)

## 2018-05-28 LAB
BACTERIA SPEC AEROBE CULT: NORMAL
BACTERIA SPEC AEROBE CULT: NORMAL

## 2018-05-29 RX ORDER — PRAVASTATIN SODIUM 20 MG
TABLET ORAL
Qty: 90 TABLET | Refills: 1 | Status: SHIPPED | OUTPATIENT
Start: 2018-05-29 | End: 2019-03-11 | Stop reason: SDUPTHER

## 2018-09-18 RX ORDER — CLOPIDOGREL BISULFATE 75 MG/1
TABLET ORAL
Qty: 90 TABLET | Refills: 2 | Status: SHIPPED | OUTPATIENT
Start: 2018-09-18 | End: 2019-09-03 | Stop reason: SDUPTHER

## 2018-10-10 PROCEDURE — 87086 URINE CULTURE/COLONY COUNT: CPT | Performed by: NURSE PRACTITIONER

## 2018-10-12 ENCOUNTER — TELEPHONE (OUTPATIENT)
Dept: URGENT CARE | Facility: CLINIC | Age: 68
End: 2018-10-12

## 2018-10-12 NOTE — TELEPHONE ENCOUNTER
Notified Ms. Mccabe of urine culture results. She states she is feeling better. Advised she can stop abx and if symptoms return follow up with PCP, voiced understanding.

## 2018-10-23 RX ORDER — CARVEDILOL 12.5 MG/1
TABLET ORAL
Qty: 60 TABLET | Refills: 5 | Status: SHIPPED | OUTPATIENT
Start: 2018-10-23 | End: 2019-03-11 | Stop reason: SDUPTHER

## 2019-01-26 PROCEDURE — 87086 URINE CULTURE/COLONY COUNT: CPT | Performed by: NURSE PRACTITIONER

## 2019-01-29 ENCOUNTER — TELEPHONE (OUTPATIENT)
Dept: URGENT CARE | Facility: CLINIC | Age: 69
End: 2019-01-29

## 2019-03-11 DIAGNOSIS — E78.2 MIXED HYPERLIPIDEMIA: Primary | ICD-10-CM

## 2019-03-11 RX ORDER — PRAVASTATIN SODIUM 20 MG
TABLET ORAL
Qty: 30 TABLET | Refills: 0 | Status: SHIPPED | OUTPATIENT
Start: 2019-03-11 | End: 2019-04-03 | Stop reason: SDUPTHER

## 2019-03-12 RX ORDER — CARVEDILOL 12.5 MG/1
TABLET ORAL
Qty: 180 TABLET | Refills: 3 | Status: SHIPPED | OUTPATIENT
Start: 2019-03-12 | End: 2020-04-29

## 2019-03-16 ENCOUNTER — RESULTS ENCOUNTER (OUTPATIENT)
Dept: CARDIOLOGY | Facility: CLINIC | Age: 69
End: 2019-03-16

## 2019-03-16 DIAGNOSIS — E78.2 MIXED HYPERLIPIDEMIA: ICD-10-CM

## 2019-04-03 RX ORDER — PRAVASTATIN SODIUM 20 MG
TABLET ORAL
Qty: 30 TABLET | Refills: 0 | Status: SHIPPED | OUTPATIENT
Start: 2019-04-03 | End: 2019-07-13 | Stop reason: SDUPTHER

## 2019-04-26 ENCOUNTER — OFFICE VISIT (OUTPATIENT)
Dept: CARDIOLOGY | Facility: CLINIC | Age: 69
End: 2019-04-26

## 2019-04-26 VITALS
BODY MASS INDEX: 32.09 KG/M2 | OXYGEN SATURATION: 97 % | SYSTOLIC BLOOD PRESSURE: 130 MMHG | WEIGHT: 192.6 LBS | DIASTOLIC BLOOD PRESSURE: 90 MMHG | HEIGHT: 65 IN | HEART RATE: 73 BPM

## 2019-04-26 DIAGNOSIS — E78.2 MIXED HYPERLIPIDEMIA: ICD-10-CM

## 2019-04-26 DIAGNOSIS — I10 ESSENTIAL HYPERTENSION: ICD-10-CM

## 2019-04-26 DIAGNOSIS — I25.10 CORONARY ARTERY DISEASE INVOLVING NATIVE CORONARY ARTERY OF NATIVE HEART WITHOUT ANGINA PECTORIS: Primary | ICD-10-CM

## 2019-04-26 PROCEDURE — 99214 OFFICE O/P EST MOD 30 MIN: CPT | Performed by: INTERNAL MEDICINE

## 2019-04-26 RX ORDER — BUPROPION HYDROCHLORIDE 300 MG/1
300 TABLET ORAL DAILY
Refills: 0 | COMMUNITY
Start: 2019-01-18

## 2019-04-26 RX ORDER — BENZONATATE 100 MG/1
100 CAPSULE ORAL AS NEEDED
COMMUNITY

## 2019-04-26 RX ORDER — PANTOPRAZOLE SODIUM 40 MG/1
1 TABLET, DELAYED RELEASE ORAL DAILY
Refills: 0 | COMMUNITY
Start: 2019-03-12 | End: 2020-10-17

## 2019-04-26 NOTE — PROGRESS NOTES
Encounter Date:04/26/2019      Patient ID: Nallely Mccabe is a 68 y.o. female.    Chief Complaint: Coronary Artery Disease      PROBLEM LIST:  1. Coronary artery disease:  a. ER presentation on 10/18/2011 with ACS/non-ST  elevation MI.   b. LHC by Dr. Chand on 10/18/2011: Nonobstructive CAD with 30% plaque of the mid LAD, no other disease. Severe LV dysfunction with EF 25% and with diastolic dysfunction.   c. Echocardiogram, 03/02/2012: Normalization of LV systolic function, EF 50-55%.    d. Cardiolite stress test, 03/15/2013: Excellent exercise capacity with normal perfusion scan. EF 58%.    e. 24h Holter, March 2014: NSR with occasional PACs and PVCs, no significant arrhythmias.    f. Nuclear perfusion study, 08/06/2015, Kandy Chand MD, revealed no evidence of reversible ischemia and a small mild fixed apical defect consistent with soft  tissue attenuation artifact.  EF 77%.  2. Hypertension.  3. Hyperlipidemia.  4. PUD/GERD.   5. Anxiety/depression.   6. Obesity, BMI 33.  7. Surgical history:  a.  Eye surgery.   b. Sinus surgery.    History of Present Illness  Patient presents today for annual follow-up with a history of coronary artery disease and cardiac risk factors. Since last visit, she has been doing well overall from a cardiovascular standpoint. She did go to the hospital in May last year, but this was for sepsis. She monitors her blood pressure at home, with readings typically in the 120-130 systolic range. She admits that she does not watch her sodium consumption, and does eat too much salt. Denies chest pain, shortness of breath, leg swelling, palpitations, and syncope. Remains busy and active with her housework, but she admits she does not have a routine exercise regimen.    No Known Allergies      Current Outpatient Medications:   •  acetaminophen (TYLENOL) 500 MG tablet, Take 500 mg by mouth As Needed for mild pain (1-3)., Disp: , Rfl:   •  ALPRAZolam (XANAX) 1 MG tablet, Take 1 mg by mouth  3 (Three) Times a Day As Needed., Disp: , Rfl: 0  •  aspirin  MG tablet, Take 325 mg by mouth Daily., Disp: , Rfl:   •  benzonatate (TESSALON) 100 MG capsule, Take 100 mg by mouth As Needed for Cough., Disp: , Rfl:   •  buPROPion XL (WELLBUTRIN XL) 150 MG 24 hr tablet, Take 150 mg by mouth Daily., Disp: , Rfl: 0  •  carboxymethylcellulose (REFRESH TEARS) 0.5 % solution, Administer 2 drops to both eyes 3 (Three) Times a Day As Needed for Dry Eyes., Disp: , Rfl:   •  carvedilol (COREG) 12.5 MG tablet, take 1 tablet by mouth twice a day, Disp: 180 tablet, Rfl: 3  •  clopidogrel (PLAVIX) 75 MG tablet, take 1 tablet by mouth once daily, Disp: 90 tablet, Rfl: 2  •  DEXILANT 60 MG capsule, Take 60 mg by mouth Daily., Disp: , Rfl: 1  •  diclofenac (VOLTAREN) 1 % gel gel, Apply 4 g topically As Needed., Disp: , Rfl:   •  dicyclomine (BENTYL) 10 MG capsule, Take 10 mg by mouth As Needed., Disp: , Rfl: 0  •  fluticasone (FLONASE) 50 MCG/ACT nasal spray, 1 spray into the nostril(s) as directed by provider Daily., Disp: , Rfl: 0  •  meclizine (ANTIVERT) 25 MG tablet, Take 25 mg by mouth As Needed., Disp: , Rfl: 0  •  MEGARED OMEGA-3 KRILL  MG capsule, Take 1,000 mg by mouth Daily., Disp: , Rfl:   •  Multiple Vitamins-Minerals (PRESERVISION AREDS 2 PO), Take 1 tablet by mouth 2 (Two) Times a Day., Disp: , Rfl:   •  nitroglycerin (NITROSTAT) 0.4 MG SL tablet, Place 0.4 mg under the tongue As Needed for chest pain. Take no more than 3 doses in 15 minutes., Disp: , Rfl:   •  ondansetron (ZOFRAN) 4 MG tablet, take 1 tablet by mouth four times a day if needed for nausea, Disp: , Rfl: 0  •  polyethylene glycol (MIRALAX) powder, Take 17 g by mouth Daily., Disp: , Rfl: 0  •  pravastatin (PRAVACHOL) 20 MG tablet, take 1 tablet by mouth once daily at bedtime, Disp: 30 tablet, Rfl: 0    The following portions of the patient's history were reviewed and updated as appropriate: allergies, current medications, past family history,  "past medical history, past social history, past surgical history and problem list.    ROS  Review of Systems   Constitution: Negative for chills, fatigue, fever, generalized weakness, weight gain and weight loss.   Cardiovascular: Negative for chest pain, claudication, dyspnea on exertion, leg swelling, orthopnea, palpitations, paroxysmal nocturnal dyspnea and syncope.   Respiratory: Negative for cough, shortness of breath, snoring, and wheezing.  HENT: Negative for ear pain, nosebleeds, and tinnitus.  Gastrointestinal: Negative for abdominal pain, constipation, diarrhea, nausea and vomiting.   Genitourinary: No urinary symptoms   Neurological: Negative for dizziness, headaches, loss of balance, numbness, and symptoms of stroke.  Psychiatric: Normal mental status.     All other systems reviewed and are negative.    Objective:     Ht 165.1 cm (65\")   Wt 87.4 kg (192 lb 9.6 oz)   BMI 32.05 kg/m²    Repeat blood pressure measurement by, Kandy Chand MD, at 140/90.      Physical Exam  Constitutional: Patient appears well-developed and well-nourished.   HENT: HEENT exam unremarkable.   Neck: Neck supple. No JVD present. No carotid bruits.   Cardiovascular: Normal rate, regular rhythm and normal heart sounds. No murmur heard.   2+ symmetric pulses.   Pulmonary/Chest: Breath sounds normal. Does not exhibit tenderness.   Abdominal: Abdomen benign.   Musculoskeletal: Does not exhibit edema.   Neurological: Neurological exam unremarkable.   Vitals reviewed.    Lab Review:   No recent lab results available for review.     Procedures       Assessment:      Diagnosis Plan   1. Coronary artery disease involving native coronary artery of native heart without angina pectoris  Stable, continue current medications.   2. Essential hypertension  Well-controlled, continue current medications.   3. Mixed hyperlipidemia  Continue pravastatin 20 mg.     Plan:   DASH Eating plan recommended.  Stable cardiac status.  Continue current " medications.   FU in 12 MO, sooner as needed.  Thank you for allowing us to participate in the care of your patient.     Scribed for Kandy Chand MD by Mariel Ashton. 4/26/2019  12:46 PM      I, Kandy Chand MD, personally performed the services described in this documentation as scribed by the above named individual in my presence, and it is both accurate and complete.  4/26/2019  1:20 PM        Please note that portions of this note may have been completed with a voice recognition program. Efforts were made to edit the dictations, but occasionally words are mistranscribed.

## 2019-04-26 NOTE — PATIENT INSTRUCTIONS
"DASH Eating Plan  DASH stands for \"Dietary Approaches to Stop Hypertension.\" The DASH eating plan is a healthy eating plan that has been shown to reduce high blood pressure (hypertension). It may also reduce your risk for type 2 diabetes, heart disease, and stroke. The DASH eating plan may also help with weight loss.  What are tips for following this plan?  General guidelines  · Avoid eating more than 2,300 mg (milligrams) of salt (sodium) a day. If you have hypertension, you may need to reduce your sodium intake to 1,500 mg a day.  · Limit alcohol intake to no more than 1 drink a day for nonpregnant women and 2 drinks a day for men. One drink equals 12 oz of beer, 5 oz of wine, or 1½ oz of hard liquor.  · Work with your health care provider to maintain a healthy body weight or to lose weight. Ask what an ideal weight is for you.  · Get at least 30 minutes of exercise that causes your heart to beat faster (aerobic exercise) most days of the week. Activities may include walking, swimming, or biking.  · Work with your health care provider or diet and nutrition specialist (dietitian) to adjust your eating plan to your individual calorie needs.  Reading food labels  · Check food labels for the amount of sodium per serving. Choose foods with less than 5 percent of the Daily Value of sodium. Generally, foods with less than 300 mg of sodium per serving fit into this eating plan.  · To find whole grains, look for the word \"whole\" as the first word in the ingredient list.  Shopping  · Buy products labeled as \"low-sodium\" or \"no salt added.\"  · Buy fresh foods. Avoid canned foods and premade or frozen meals.  Cooking  · Avoid adding salt when cooking. Use salt-free seasonings or herbs instead of table salt or sea salt. Check with your health care provider or pharmacist before using salt substitutes.  · Do not fernandez foods. Cook foods using healthy methods such as baking, boiling, grilling, and broiling instead.  · Cook with " heart-healthy oils, such as olive, canola, soybean, or sunflower oil.  Meal planning    · Eat a balanced diet that includes:  ? 5 or more servings of fruits and vegetables each day. At each meal, try to fill half of your plate with fruits and vegetables.  ? Up to 6-8 servings of whole grains each day.  ? Less than 6 oz of lean meat, poultry, or fish each day. A 3-oz serving of meat is about the same size as a deck of cards. One egg equals 1 oz.  ? 2 servings of low-fat dairy each day.  ? A serving of nuts, seeds, or beans 5 times each week.  ? Heart-healthy fats. Healthy fats called Omega-3 fatty acids are found in foods such as flaxseeds and coldwater fish, like sardines, salmon, and mackerel.  · Limit how much you eat of the following:  ? Canned or prepackaged foods.  ? Food that is high in trans fat, such as fried foods.  ? Food that is high in saturated fat, such as fatty meat.  ? Sweets, desserts, sugary drinks, and other foods with added sugar.  ? Full-fat dairy products.  · Do not salt foods before eating.  · Try to eat at least 2 vegetarian meals each week.  · Eat more home-cooked food and less restaurant, buffet, and fast food.  · When eating at a restaurant, ask that your food be prepared with less salt or no salt, if possible.  What foods are recommended?  The items listed may not be a complete list. Talk with your dietitian about what dietary choices are best for you.  Grains  Whole-grain or whole-wheat bread. Whole-grain or whole-wheat pasta. Brown rice. Oatmeal. Quinoa. Bulgur. Whole-grain and low-sodium cereals. Samantha bread. Low-fat, low-sodium crackers. Whole-wheat flour tortillas.  Vegetables  Fresh or frozen vegetables (raw, steamed, roasted, or grilled). Low-sodium or reduced-sodium tomato and vegetable juice. Low-sodium or reduced-sodium tomato sauce and tomato paste. Low-sodium or reduced-sodium canned vegetables.  Fruits  All fresh, dried, or frozen fruit. Canned fruit in natural juice (without  added sugar).  Meat and other protein foods  Skinless chicken or turkey. Ground chicken or turkey. Pork with fat trimmed off. Fish and seafood. Egg whites. Dried beans, peas, or lentils. Unsalted nuts, nut butters, and seeds. Unsalted canned beans. Lean cuts of beef with fat trimmed off. Low-sodium, lean deli meat.  Dairy  Low-fat (1%) or fat-free (skim) milk. Fat-free, low-fat, or reduced-fat cheeses. Nonfat, low-sodium ricotta or cottage cheese. Low-fat or nonfat yogurt. Low-fat, low-sodium cheese.  Fats and oils  Soft margarine without trans fats. Vegetable oil. Low-fat, reduced-fat, or light mayonnaise and salad dressings (reduced-sodium). Canola, safflower, olive, soybean, and sunflower oils. Avocado.  Seasoning and other foods  Herbs. Spices. Seasoning mixes without salt. Unsalted popcorn and pretzels. Fat-free sweets.  What foods are not recommended?  The items listed may not be a complete list. Talk with your dietitian about what dietary choices are best for you.  Grains  Baked goods made with fat, such as croissants, muffins, or some breads. Dry pasta or rice meal packs.  Vegetables  Creamed or fried vegetables. Vegetables in a cheese sauce. Regular canned vegetables (not low-sodium or reduced-sodium). Regular canned tomato sauce and paste (not low-sodium or reduced-sodium). Regular tomato and vegetable juice (not low-sodium or reduced-sodium). Pickles. Olives.  Fruits  Canned fruit in a light or heavy syrup. Fried fruit. Fruit in cream or butter sauce.  Meat and other protein foods  Fatty cuts of meat. Ribs. Fried meat. Nicole. Sausage. Bologna and other processed lunch meats. Salami. Fatback. Hotdogs. Bratwurst. Salted nuts and seeds. Canned beans with added salt. Canned or smoked fish. Whole eggs or egg yolks. Chicken or turkey with skin.  Dairy  Whole or 2% milk, cream, and half-and-half. Whole or full-fat cream cheese. Whole-fat or sweetened yogurt. Full-fat cheese. Nondairy creamers. Whipped toppings.  Processed cheese and cheese spreads.  Fats and oils  Butter. Stick margarine. Lard. Shortening. Ghee. Nicole fat. Tropical oils, such as coconut, palm kernel, or palm oil.  Seasoning and other foods  Salted popcorn and pretzels. Onion salt, garlic salt, seasoned salt, table salt, and sea salt. Worcestershire sauce. Tartar sauce. Barbecue sauce. Teriyaki sauce. Soy sauce, including reduced-sodium. Steak sauce. Canned and packaged gravies. Fish sauce. Oyster sauce. Cocktail sauce. Horseradish that you find on the shelf. Ketchup. Mustard. Meat flavorings and tenderizers. Bouillon cubes. Hot sauce and Tabasco sauce. Premade or packaged marinades. Premade or packaged taco seasonings. Relishes. Regular salad dressings.  Where to find more information:  · National Heart, Lung, and Blood [place]: www.nhlbi.nih.gov  · American Heart Association: www.heart.org  Summary  · The DASH eating plan is a healthy eating plan that has been shown to reduce high blood pressure (hypertension). It may also reduce your risk for type 2 diabetes, heart disease, and stroke.  · With the DASH eating plan, you should limit salt (sodium) intake to 2,300 mg a day. If you have hypertension, you may need to reduce your sodium intake to 1,500 mg a day.  · When on the DASH eating plan, aim to eat more fresh fruits and vegetables, whole grains, lean proteins, low-fat dairy, and heart-healthy fats.  · Work with your health care provider or diet and nutrition specialist (dietitian) to adjust your eating plan to your individual calorie needs.  This information is not intended to replace advice given to you by your health care provider. Make sure you discuss any questions you have with your health care provider.  Document Released: 12/06/2012 Document Revised: 12/11/2017 Document Reviewed: 12/11/2017  [employer] Patient Education © 2019 [employer]

## 2019-07-16 RX ORDER — PRAVASTATIN SODIUM 20 MG
TABLET ORAL
Qty: 30 TABLET | Refills: 0 | Status: SHIPPED | OUTPATIENT
Start: 2019-07-16 | End: 2019-07-25 | Stop reason: SDUPTHER

## 2019-07-19 ENCOUNTER — LAB REQUISITION (OUTPATIENT)
Dept: LAB | Facility: HOSPITAL | Age: 69
End: 2019-07-19

## 2019-07-19 DIAGNOSIS — Z00.00 ROUTINE GENERAL MEDICAL EXAMINATION AT A HEALTH CARE FACILITY: ICD-10-CM

## 2019-07-19 PROCEDURE — 36415 COLL VENOUS BLD VENIPUNCTURE: CPT | Performed by: INTERNAL MEDICINE

## 2019-07-20 LAB
CHOLEST SERPL-MCNC: 185 MG/DL (ref 100–199)
HDLC SERPL-MCNC: 76 MG/DL
LDLC SERPL CALC-MCNC: 93 MG/DL (ref 0–99)
TRIGL SERPL-MCNC: 78 MG/DL (ref 0–149)
VLDLC SERPL CALC-MCNC: 16 MG/DL (ref 5–40)

## 2019-07-25 RX ORDER — PRAVASTATIN SODIUM 20 MG
20 TABLET ORAL
Qty: 30 TABLET | Refills: 11 | Status: SHIPPED | OUTPATIENT
Start: 2019-07-25 | End: 2020-07-27

## 2019-08-05 ENCOUNTER — TRANSCRIBE ORDERS (OUTPATIENT)
Dept: ADMINISTRATIVE | Facility: HOSPITAL | Age: 69
End: 2019-08-05

## 2019-08-05 DIAGNOSIS — Z12.31 VISIT FOR SCREENING MAMMOGRAM: Primary | ICD-10-CM

## 2019-09-04 RX ORDER — CLOPIDOGREL BISULFATE 75 MG/1
TABLET ORAL
Qty: 90 TABLET | Refills: 1 | Status: SHIPPED | OUTPATIENT
Start: 2019-09-04 | End: 2019-11-30 | Stop reason: SDUPTHER

## 2019-09-18 ENCOUNTER — APPOINTMENT (OUTPATIENT)
Dept: MAMMOGRAPHY | Facility: HOSPITAL | Age: 69
End: 2019-09-18

## 2019-11-05 ENCOUNTER — APPOINTMENT (OUTPATIENT)
Dept: MAMMOGRAPHY | Facility: HOSPITAL | Age: 69
End: 2019-11-05

## 2019-11-10 PROCEDURE — 87088 URINE BACTERIA CULTURE: CPT | Performed by: FAMILY MEDICINE

## 2019-11-10 PROCEDURE — 87186 SC STD MICRODIL/AGAR DIL: CPT | Performed by: FAMILY MEDICINE

## 2019-11-10 PROCEDURE — 87086 URINE CULTURE/COLONY COUNT: CPT | Performed by: FAMILY MEDICINE

## 2019-11-12 ENCOUNTER — TELEPHONE (OUTPATIENT)
Dept: URGENT CARE | Facility: CLINIC | Age: 69
End: 2019-11-12

## 2019-11-12 NOTE — TELEPHONE ENCOUNTER
Notified ms. Mccabe of urine culture results. States symptoms seem to be better. Advised will send RX to [facility] 300mg BID x 7 days to [facility], stop Doxy and follow up with PCP if symptoms do not resolve, voiced understanding.

## 2019-12-02 RX ORDER — CLOPIDOGREL BISULFATE 75 MG/1
TABLET ORAL
Qty: 90 TABLET | Refills: 3 | Status: SHIPPED | OUTPATIENT
Start: 2019-12-02 | End: 2020-12-10

## 2020-02-15 PROCEDURE — 87086 URINE CULTURE/COLONY COUNT: CPT | Performed by: FAMILY MEDICINE

## 2020-03-02 DIAGNOSIS — Z12.11 SCREENING FOR COLON CANCER: Primary | ICD-10-CM

## 2020-03-12 ENCOUNTER — LAB REQUISITION (OUTPATIENT)
Dept: LAB | Facility: HOSPITAL | Age: 70
End: 2020-03-12

## 2020-03-12 ENCOUNTER — OUTSIDE FACILITY SERVICE (OUTPATIENT)
Dept: GASTROENTEROLOGY | Facility: CLINIC | Age: 70
End: 2020-03-12

## 2020-03-12 DIAGNOSIS — Z80.0 FAMILY HISTORY OF MALIGNANT NEOPLASM OF DIGESTIVE ORGANS: ICD-10-CM

## 2020-03-12 DIAGNOSIS — Z12.11 ENCOUNTER FOR SCREENING FOR MALIGNANT NEOPLASM OF COLON: ICD-10-CM

## 2020-03-12 DIAGNOSIS — Z86.010 PERSONAL HISTORY OF COLONIC POLYPS: ICD-10-CM

## 2020-03-12 PROCEDURE — G0500 MOD SEDAT ENDO SERVICE >5YRS: HCPCS | Performed by: INTERNAL MEDICINE

## 2020-03-12 PROCEDURE — 88305 TISSUE EXAM BY PATHOLOGIST: CPT | Performed by: INTERNAL MEDICINE

## 2020-03-12 PROCEDURE — 45385 COLONOSCOPY W/LESION REMOVAL: CPT | Performed by: INTERNAL MEDICINE

## 2020-03-13 LAB
CYTO UR: NORMAL
LAB AP CASE REPORT: NORMAL
LAB AP CLINICAL INFORMATION: NORMAL
PATH REPORT.FINAL DX SPEC: NORMAL
PATH REPORT.GROSS SPEC: NORMAL

## 2020-03-30 ENCOUNTER — APPOINTMENT (OUTPATIENT)
Dept: MAMMOGRAPHY | Facility: HOSPITAL | Age: 70
End: 2020-03-30

## 2020-04-29 RX ORDER — CARVEDILOL 12.5 MG/1
TABLET ORAL
Qty: 180 TABLET | Refills: 3 | Status: SHIPPED | OUTPATIENT
Start: 2020-04-29 | End: 2020-10-17

## 2020-06-02 ENCOUNTER — APPOINTMENT (OUTPATIENT)
Dept: PREADMISSION TESTING | Facility: HOSPITAL | Age: 70
End: 2020-06-02

## 2020-06-02 PROCEDURE — U0004 COV-19 TEST NON-CDC HGH THRU: HCPCS

## 2020-06-02 PROCEDURE — C9803 HOPD COVID-19 SPEC COLLECT: HCPCS

## 2020-06-02 PROCEDURE — U0002 COVID-19 LAB TEST NON-CDC: HCPCS

## 2020-06-03 LAB
REF LAB TEST METHOD: NORMAL
SARS-COV-2 RNA RESP QL NAA+PROBE: NOT DETECTED

## 2020-06-04 ENCOUNTER — OUTSIDE FACILITY SERVICE (OUTPATIENT)
Dept: GASTROENTEROLOGY | Facility: CLINIC | Age: 70
End: 2020-06-04

## 2020-06-04 PROCEDURE — 88305 TISSUE EXAM BY PATHOLOGIST: CPT | Performed by: INTERNAL MEDICINE

## 2020-06-04 PROCEDURE — 43239 EGD BIOPSY SINGLE/MULTIPLE: CPT | Performed by: INTERNAL MEDICINE

## 2020-06-04 PROCEDURE — G0500 MOD SEDAT ENDO SERVICE >5YRS: HCPCS | Performed by: INTERNAL MEDICINE

## 2020-06-04 PROCEDURE — 43249 ESOPH EGD DILATION <30 MM: CPT | Performed by: INTERNAL MEDICINE

## 2020-06-05 ENCOUNTER — LAB REQUISITION (OUTPATIENT)
Dept: LAB | Facility: HOSPITAL | Age: 70
End: 2020-06-05

## 2020-06-05 DIAGNOSIS — R13.14 DYSPHAGIA, PHARYNGOESOPHAGEAL PHASE: ICD-10-CM

## 2020-06-05 DIAGNOSIS — K21.00 GASTRO-ESOPHAGEAL REFLUX DISEASE WITH ESOPHAGITIS: ICD-10-CM

## 2020-07-20 PROCEDURE — 99283 EMERGENCY DEPT VISIT LOW MDM: CPT

## 2020-07-20 PROCEDURE — 99152 MOD SED SAME PHYS/QHP 5/>YRS: CPT

## 2020-07-20 PROCEDURE — 99284 EMERGENCY DEPT VISIT MOD MDM: CPT

## 2020-07-20 PROCEDURE — 99282 EMERGENCY DEPT VISIT SF MDM: CPT

## 2020-07-21 ENCOUNTER — HOSPITAL ENCOUNTER (EMERGENCY)
Facility: HOSPITAL | Age: 70
Discharge: HOME | End: 2020-07-21
Attending: EMERGENCY MEDICINE | Admitting: EMERGENCY MEDICINE

## 2020-07-21 ENCOUNTER — APPOINTMENT (OUTPATIENT)
Dept: GENERAL RADIOLOGY | Facility: HOSPITAL | Age: 70
End: 2020-07-21

## 2020-07-21 VITALS
TEMPERATURE: 99.4 F | DIASTOLIC BLOOD PRESSURE: 89 MMHG | HEIGHT: 65 IN | WEIGHT: 195 LBS | BODY MASS INDEX: 32.49 KG/M2 | SYSTOLIC BLOOD PRESSURE: 150 MMHG | HEART RATE: 92 BPM | OXYGEN SATURATION: 93 % | RESPIRATION RATE: 16 BRPM

## 2020-07-21 DIAGNOSIS — S43.014A ANTERIOR DISLOCATION OF RIGHT SHOULDER, INITIAL ENCOUNTER: Primary | ICD-10-CM

## 2020-07-21 PROCEDURE — 25010000002 PROPOFOL 10 MG/ML EMULSION: Performed by: EMERGENCY MEDICINE

## 2020-07-21 PROCEDURE — 99152 MOD SED SAME PHYS/QHP 5/>YRS: CPT

## 2020-07-21 PROCEDURE — 73030 X-RAY EXAM OF SHOULDER: CPT

## 2020-07-21 RX ORDER — HYDROCODONE BITARTRATE AND ACETAMINOPHEN 5; 325 MG/1; MG/1
1 TABLET ORAL EVERY 4 HOURS PRN
Qty: 5 TABLET | Refills: 0 | Status: SHIPPED | OUTPATIENT
Start: 2020-07-21 | End: 2020-08-07

## 2020-07-21 RX ORDER — CYCLOBENZAPRINE HCL 5 MG
5 TABLET ORAL 3 TIMES DAILY PRN
Qty: 10 TABLET | Refills: 0 | Status: SHIPPED | OUTPATIENT
Start: 2020-07-21 | End: 2020-08-07

## 2020-07-21 RX ORDER — DOCUSATE SODIUM 100 MG/1
100 CAPSULE, LIQUID FILLED ORAL 2 TIMES DAILY PRN
Qty: 15 CAPSULE | Refills: 0 | Status: SHIPPED | OUTPATIENT
Start: 2020-07-21 | End: 2020-08-07

## 2020-07-21 RX ORDER — PROPOFOL 10 MG/ML
5 VIAL (ML) INTRAVENOUS ONCE
Status: COMPLETED | OUTPATIENT
Start: 2020-07-21 | End: 2020-07-21

## 2020-07-21 RX ADMIN — SODIUM CHLORIDE 1000 ML: 9 INJECTION, SOLUTION INTRAVENOUS at 03:21

## 2020-07-21 RX ADMIN — PROPOFOL 60 MG: 10 INJECTION, EMULSION INTRAVENOUS at 03:31

## 2020-07-27 RX ORDER — PRAVASTATIN SODIUM 20 MG
20 TABLET ORAL
Qty: 90 TABLET | Refills: 0 | Status: SHIPPED | OUTPATIENT
Start: 2020-07-27 | End: 2020-12-07 | Stop reason: SDUPTHER

## 2020-08-07 ENCOUNTER — OFFICE VISIT (OUTPATIENT)
Dept: CARDIOLOGY | Facility: CLINIC | Age: 70
End: 2020-08-07

## 2020-08-07 VITALS
HEIGHT: 65 IN | OXYGEN SATURATION: 99 % | WEIGHT: 204.38 LBS | DIASTOLIC BLOOD PRESSURE: 106 MMHG | HEART RATE: 80 BPM | SYSTOLIC BLOOD PRESSURE: 174 MMHG | BODY MASS INDEX: 34.05 KG/M2

## 2020-08-07 DIAGNOSIS — I10 ESSENTIAL HYPERTENSION: ICD-10-CM

## 2020-08-07 DIAGNOSIS — E78.2 MIXED HYPERLIPIDEMIA: ICD-10-CM

## 2020-08-07 DIAGNOSIS — I25.10 CORONARY ARTERY DISEASE INVOLVING NATIVE CORONARY ARTERY OF NATIVE HEART WITHOUT ANGINA PECTORIS: Primary | ICD-10-CM

## 2020-08-07 PROCEDURE — 99214 OFFICE O/P EST MOD 30 MIN: CPT | Performed by: INTERNAL MEDICINE

## 2020-08-07 RX ORDER — LOSARTAN POTASSIUM AND HYDROCHLOROTHIAZIDE 12.5; 5 MG/1; MG/1
1 TABLET ORAL DAILY
Qty: 90 TABLET | Refills: 3 | Status: SHIPPED | OUTPATIENT
Start: 2020-08-07 | End: 2021-02-25 | Stop reason: SDUPTHER

## 2020-08-07 NOTE — PROGRESS NOTES
[facility] Cardiology    Encounter Date: 2020    Patient ID: Nallely Mccabe is a 69 y.o. female.  : 1950     PCP: Shay Rivera MD       Chief Complaint: Coronary Artery Disease      PROBLEM LIST:  1. Coronary artery disease:  a. ER presentation on 10/18/2011 with ACS/non-ST  elevation MI.   b. LHC by Dr. Chand on 10/18/2011: Nonobstructive CAD with 30% plaque of the mid LAD, no other disease. Severe LV dysfunction with EF 25% and with diastolic dysfunction.   c. Echocardiogram, 2012: Normalization of LV systolic function, EF 50-55%.    d. MPS, 03/15/2013: Excellent exercise capacity with normal perfusion scan. EF 58%.    e. 24h Holter, 2014: NSR with occasional PACs and PVCs, no significant arrhythmias.    f. MPS, 2015, Kandy Chand MD, revealed no evidence of reversible ischemia and a small mild fixed apical defect consistent with soft  tissue attenuation artifact.  EF 77%.  2. Hypertension.  3. Hyperlipidemia.  4. PUD/GERD.   5. Anxiety/depression.   6. Obesity, BMI 33.  7. Surgical history:  a.  Eye surgery.   b. Sinus surgery.    History of Present Illness  Patient presents today for a follow-up with a history of CAD and cardiac risk factors. Since last visit, she has been experiencing variations in her blood pressure. It is sometimes normal, around 127/89, but will occasionally spike to 150/90 or more. Patient admits she does not have an exercise routine, and has not been active during the COVID-19 lock-down. Patient has not been following a healthy diet and has been gaining weight. Patient denies chest pain, shortness of breath, palpitations, edema, dizziness, and syncope.      No Known Allergies      Current Outpatient Medications:   •  acetaminophen (TYLENOL) 500 MG tablet, Take 500 mg by mouth As Needed for mild pain (1-3)., Disp: , Rfl:   •  ALPRAZolam (XANAX) 1 MG tablet, Take 1 mg by mouth 3 (Three) Times a Day As Needed., Disp: , Rfl:  0  •  aspirin  MG tablet, Take 325 mg by mouth Daily., Disp: , Rfl:   •  benzonatate (TESSALON) 100 MG capsule, Take 100 mg by mouth As Needed for Cough., Disp: , Rfl:   •  buPROPion XL (WELLBUTRIN XL) 300 MG 24 hr tablet, Take 300 mg by mouth Daily., Disp: , Rfl: 0  •  carboxymethylcellulose (REFRESH TEARS) 0.5 % solution, Administer 2 drops to both eyes 3 (Three) Times a Day As Needed for Dry Eyes., Disp: , Rfl:   •  carvedilol (COREG) 12.5 MG tablet, TAKE 1 TABLET BY MOUTH TWICE A DAY, Disp: 180 tablet, Rfl: 3  •  clopidogrel (PLAVIX) 75 MG tablet, TAKE 1 TABLET BY MOUTH ONCE DAILY, Disp: 90 tablet, Rfl: 3  •  diclofenac (VOLTAREN) 1 % gel gel, Apply 4 g topically As Needed., Disp: , Rfl:   •  dicyclomine (BENTYL) 10 MG capsule, Take 10 mg by mouth As Needed., Disp: , Rfl: 0  •  fluticasone (FLONASE) 50 MCG/ACT nasal spray, 1 spray into the nostril(s) as directed by provider Daily., Disp: , Rfl: 0  •  meclizine (ANTIVERT) 25 MG tablet, Take 25 mg by mouth As Needed., Disp: , Rfl: 0  •  MEGARED OMEGA-3 KRILL  MG capsule, Take 1,000 mg by mouth Daily., Disp: , Rfl:   •  Multiple Vitamins-Minerals (PRESERVISION AREDS 2 PO), Take 1 tablet by mouth 2 (Two) Times a Day., Disp: , Rfl:   •  ondansetron (ZOFRAN) 4 MG tablet, take 1 tablet by mouth four times a day if needed for nausea, Disp: , Rfl: 0  •  pravastatin (PRAVACHOL) 20 MG tablet, Take 1 tablet by mouth every night at bedtime. NEED UPDATED LAB WORK FOR FURTHER REFILLS, Disp: 90 tablet, Rfl: 0  •  Wheat Dextrin (BENEFIBER PO), Take  by mouth., Disp: , Rfl:   •  buPROPion XL (WELLBUTRIN XL) 150 MG 24 hr tablet, Take 150 mg by mouth Daily., Disp: , Rfl: 0  •  cyclobenzaprine (FLEXERIL) 5 MG tablet, Take 1 tablet by mouth 3 (Three) Times a Day As Needed for Muscle Spasms., Disp: 10 tablet, Rfl: 0  •  docusate sodium (COLACE) 100 MG capsule, Take 1 capsule by mouth 2 (Two) Times a Day As Needed for Constipation., Disp: 15 capsule, Rfl: 0  •   "HYDROcodone-acetaminophen (NORCO) 5-325 MG per tablet, Take 1 tablet by mouth Every 4 (Four) Hours As Needed for Severe Pain ., Disp: 5 tablet, Rfl: 0  •  losartan-hydrochlorothiazide (Hyzaar) 50-12.5 MG per tablet, Take 1 tablet by mouth Daily., Disp: 90 tablet, Rfl: 3  •  nitroglycerin (NITROSTAT) 0.4 MG SL tablet, Place 0.4 mg under the tongue As Needed for chest pain. Take no more than 3 doses in 15 minutes., Disp: , Rfl:   •  pantoprazole (PROTONIX) 40 MG EC tablet, Take 1 tablet by mouth Daily., Disp: , Rfl: 0  •  Sod Picosulfate-Mag Ox-Cit Acd 10-3.5-12 MG-GM -GM/160ML solution, Take 1 kit by mouth Take As Directed. Follow instructions that were mailed to your home. If you didn't receive these call [phone]., Disp: 2 bottle, Rfl: 0    The following portions of the patient's history were reviewed and updated as appropriate: allergies, current medications, past family history, past medical history, past social history, past surgical history and problem list.    ROS  Review of Systems   Constitution: Negative for chills, fever, fatigue, generalized weakness. Positive for weight gain.  Cardiovascular: Negative for chest pain, dyspnea on exertion, leg swelling, palpitations, orthopnea, and syncope.   Respiratory: Negative for cough, shortness of breath, and wheezing.  HENT: Negative for ear pain, nosebleeds, and tinnitus.  Gastrointestinal: Negative for abdominal pain, constipation, diarrhea, nausea and vomiting.   Genitourinary: No urinary symptoms.  Musculoskeletal: Negative for muscle cramps.  Neurological: Negative for dizziness, headaches, loss of balance, numbness, and symptoms of stroke.  Psychiatric: Normal mental status.     All other systems reviewed and are negative.        Objective:     BP (!) 174/106 (BP Location: Left arm, Patient Position: Sitting)   Pulse 80   Ht 165.1 cm (65\")   Wt 92.7 kg (204 lb 6 oz)   SpO2 99%   BMI 34.01 kg/m²    Repeat BP measurement by Dr. Chand: " 146/100    Physical Exam  Constitutional: Patient appears well-developed and well-nourished.   HENT: HEENT exam unremarkable.   Neck: Neck supple. No JVD present. No carotid bruits.   Cardiovascular: Normal rate, regular rhythm and normal heart sounds. No murmur heard.   2+ symmetric pulses.   Pulmonary/Chest: Breath sounds normal. Does not exhibit tenderness.   Abdominal: Abdomen benign.   Musculoskeletal: Does not exhibit edema.   Neurological: Neurological exam unremarkable.   Vitals reviewed.    Data Review:   Lab Results   Component Value Date    GLUCOSE 102 (H) 05/27/2018    BUN 7 (L) 05/27/2018    CREATININE 1.30 05/27/2018    EGFRIFNONA 41 (L) 05/27/2018    BCR 5.4 (L) 05/27/2018    K 4.0 05/27/2018    CO2 25.0 05/27/2018    CALCIUM 9.2 05/27/2018    ALBUMIN 3.70 05/23/2018    AST 37 (H) 05/23/2018    ALT 26 05/23/2018     Lab Results   Component Value Date    CHLPL 185 07/19/2019    TRIG 78 07/19/2019    HDL 76 07/19/2019    LDL 93 07/19/2019         Procedures       Assessment:      Diagnosis Plan   1. Coronary artery disease involving native coronary artery of native heart without angina pectoris   stable, no angina, continue current treatment with aspirin carvedilol and statin.   2. Essential hypertension   uncontrolled, add losartan HCTZ 50/12.5 daily for better control.   3. Mixed hyperlipidemia   to new current statin therapy.     Plan:   Start losartan-hydrochlorothiazide 50-12.5 mg daily for better control of blood pressure.   DASH diet exercise and weight loss recommended.  Continue all other current medications.   FU in 3 MO, sooner as needed.  Thank you for allowing us to participate in the care of your patient.     Scribed for Kandy Chand MD by Mariel Ashton. 8/7/2020  18:06     I, Kandy Chand MD, personally performed the services described in this documentation as scribed by the above named individual in my presence, and it is both accurate and complete.  8/7/2020  18:06    Please note that  portions of this note may have been completed with a voice recognition program. Efforts were made to edit the dictations, but occasionally words are mistranscribed.

## 2020-08-07 NOTE — PATIENT INSTRUCTIONS
"DASH Eating Plan  DASH stands for \"Dietary Approaches to Stop Hypertension.\" The DASH eating plan is a healthy eating plan that has been shown to reduce high blood pressure (hypertension). It may also reduce your risk for type 2 diabetes, heart disease, and stroke. The DASH eating plan may also help with weight loss.  What are tips for following this plan?    General guidelines  · Avoid eating more than 2,300 mg (milligrams) of salt (sodium) a day. If you have hypertension, you may need to reduce your sodium intake to 1,500 mg a day.  · Limit alcohol intake to no more than 1 drink a day for nonpregnant women and 2 drinks a day for men. One drink equals 12 oz of beer, 5 oz of wine, or 1½ oz of hard liquor.  · Work with your health care provider to maintain a healthy body weight or to lose weight. Ask what an ideal weight is for you.  · Get at least 30 minutes of exercise that causes your heart to beat faster (aerobic exercise) most days of the week. Activities may include walking, swimming, or biking.  · Work with your health care provider or diet and nutrition specialist (dietitian) to adjust your eating plan to your individual calorie needs.  Reading food labels    · Check food labels for the amount of sodium per serving. Choose foods with less than 5 percent of the Daily Value of sodium. Generally, foods with less than 300 mg of sodium per serving fit into this eating plan.  · To find whole grains, look for the word \"whole\" as the first word in the ingredient list.  Shopping  · Buy products labeled as \"low-sodium\" or \"no salt added.\"  · Buy fresh foods. Avoid canned foods and premade or frozen meals.  Cooking  · Avoid adding salt when cooking. Use salt-free seasonings or herbs instead of table salt or sea salt. Check with your health care provider or pharmacist before using salt substitutes.  · Do not fernandez foods. Cook foods using healthy methods such as baking, boiling, grilling, and broiling instead.  · Cook with " heart-healthy oils, such as olive, canola, soybean, or sunflower oil.  Meal planning  · Eat a balanced diet that includes:  ? 5 or more servings of fruits and vegetables each day. At each meal, try to fill half of your plate with fruits and vegetables.  ? Up to 6-8 servings of whole grains each day.  ? Less than 6 oz of lean meat, poultry, or fish each day. A 3-oz serving of meat is about the same size as a deck of cards. One egg equals 1 oz.  ? 2 servings of low-fat dairy each day.  ? A serving of nuts, seeds, or beans 5 times each week.  ? Heart-healthy fats. Healthy fats called Omega-3 fatty acids are found in foods such as flaxseeds and coldwater fish, like sardines, salmon, and mackerel.  · Limit how much you eat of the following:  ? Canned or prepackaged foods.  ? Food that is high in trans fat, such as fried foods.  ? Food that is high in saturated fat, such as fatty meat.  ? Sweets, desserts, sugary drinks, and other foods with added sugar.  ? Full-fat dairy products.  · Do not salt foods before eating.  · Try to eat at least 2 vegetarian meals each week.  · Eat more home-cooked food and less restaurant, buffet, and fast food.  · When eating at a restaurant, ask that your food be prepared with less salt or no salt, if possible.  What foods are recommended?  The items listed may not be a complete list. Talk with your dietitian about what dietary choices are best for you.  Grains  Whole-grain or whole-wheat bread. Whole-grain or whole-wheat pasta. Brown rice. Oatmeal. Quinoa. Bulgur. Whole-grain and low-sodium cereals. Samantha bread. Low-fat, low-sodium crackers. Whole-wheat flour tortillas.  Vegetables  Fresh or frozen vegetables (raw, steamed, roasted, or grilled). Low-sodium or reduced-sodium tomato and vegetable juice. Low-sodium or reduced-sodium tomato sauce and tomato paste. Low-sodium or reduced-sodium canned vegetables.  Fruits  All fresh, dried, or frozen fruit. Canned fruit in natural juice (without  added sugar).  Meat and other protein foods  Skinless chicken or turkey. Ground chicken or turkey. Pork with fat trimmed off. Fish and seafood. Egg whites. Dried beans, peas, or lentils. Unsalted nuts, nut butters, and seeds. Unsalted canned beans. Lean cuts of beef with fat trimmed off. Low-sodium, lean deli meat.  Dairy  Low-fat (1%) or fat-free (skim) milk. Fat-free, low-fat, or reduced-fat cheeses. Nonfat, low-sodium ricotta or cottage cheese. Low-fat or nonfat yogurt. Low-fat, low-sodium cheese.  Fats and oils  Soft margarine without trans fats. Vegetable oil. Low-fat, reduced-fat, or light mayonnaise and salad dressings (reduced-sodium). Canola, safflower, olive, soybean, and sunflower oils. Avocado.  Seasoning and other foods  Herbs. Spices. Seasoning mixes without salt. Unsalted popcorn and pretzels. Fat-free sweets.  What foods are not recommended?  The items listed may not be a complete list. Talk with your dietitian about what dietary choices are best for you.  Grains  Baked goods made with fat, such as croissants, muffins, or some breads. Dry pasta or rice meal packs.  Vegetables  Creamed or fried vegetables. Vegetables in a cheese sauce. Regular canned vegetables (not low-sodium or reduced-sodium). Regular canned tomato sauce and paste (not low-sodium or reduced-sodium). Regular tomato and vegetable juice (not low-sodium or reduced-sodium). Pickles. Olives.  Fruits  Canned fruit in a light or heavy syrup. Fried fruit. Fruit in cream or butter sauce.  Meat and other protein foods  Fatty cuts of meat. Ribs. Fried meat. Nicole. Sausage. Bologna and other processed lunch meats. Salami. Fatback. Hotdogs. Bratwurst. Salted nuts and seeds. Canned beans with added salt. Canned or smoked fish. Whole eggs or egg yolks. Chicken or turkey with skin.  Dairy  Whole or 2% milk, cream, and half-and-half. Whole or full-fat cream cheese. Whole-fat or sweetened yogurt. Full-fat cheese. Nondairy creamers. Whipped toppings.  Processed cheese and cheese spreads.  Fats and oils  Butter. Stick margarine. Lard. Shortening. Ghee. Nicole fat. Tropical oils, such as coconut, palm kernel, or palm oil.  Seasoning and other foods  Salted popcorn and pretzels. Onion salt, garlic salt, seasoned salt, table salt, and sea salt. Worcestershire sauce. Tartar sauce. Barbecue sauce. Teriyaki sauce. Soy sauce, including reduced-sodium. Steak sauce. Canned and packaged gravies. Fish sauce. Oyster sauce. Cocktail sauce. Horseradish that you find on the shelf. Ketchup. Mustard. Meat flavorings and tenderizers. Bouillon cubes. Hot sauce and Tabasco sauce. Premade or packaged marinades. Premade or packaged taco seasonings. Relishes. Regular salad dressings.  Where to find more information:  · National Heart, Lung, and Blood [place]: www.nhlbi.nih.gov  · American Heart Association: www.heart.org  Summary  · The DASH eating plan is a healthy eating plan that has been shown to reduce high blood pressure (hypertension). It may also reduce your risk for type 2 diabetes, heart disease, and stroke.  · With the DASH eating plan, you should limit salt (sodium) intake to 2,300 mg a day. If you have hypertension, you may need to reduce your sodium intake to 1,500 mg a day.  · When on the DASH eating plan, aim to eat more fresh fruits and vegetables, whole grains, lean proteins, low-fat dairy, and heart-healthy fats.  · Work with your health care provider or diet and nutrition specialist (dietitian) to adjust your eating plan to your individual calorie needs.  This information is not intended to replace advice given to you by your health care provider. Make sure you discuss any questions you have with your health care provider.  Document Released: 12/06/2012 Document Revised: 11/30/2018 Document Reviewed: 12/11/2017  Elsevier Patient Education © 2020 [employer]

## 2020-10-12 ENCOUNTER — APPOINTMENT (OUTPATIENT)
Dept: MAMMOGRAPHY | Facility: HOSPITAL | Age: 70
End: 2020-10-12

## 2020-10-17 PROCEDURE — 87186 SC STD MICRODIL/AGAR DIL: CPT | Performed by: PERSONAL EMERGENCY RESPONSE ATTENDANT

## 2020-10-17 PROCEDURE — 87086 URINE CULTURE/COLONY COUNT: CPT | Performed by: PERSONAL EMERGENCY RESPONSE ATTENDANT

## 2020-10-17 PROCEDURE — 87088 URINE BACTERIA CULTURE: CPT | Performed by: PERSONAL EMERGENCY RESPONSE ATTENDANT

## 2020-11-06 DIAGNOSIS — E78.2 MIXED HYPERLIPIDEMIA: Primary | ICD-10-CM

## 2020-11-16 ENCOUNTER — LAB REQUISITION (OUTPATIENT)
Dept: LAB | Facility: HOSPITAL | Age: 70
End: 2020-11-16

## 2020-11-16 DIAGNOSIS — Z00.00 ROUTINE GENERAL MEDICAL EXAMINATION AT A HEALTH CARE FACILITY: ICD-10-CM

## 2020-11-16 LAB
CHOLEST SERPL-MCNC: 162 MG/DL (ref 0–200)
HDLC SERPL-MCNC: 79 MG/DL (ref 40–60)
LDLC SERPL CALC-MCNC: 70 MG/DL (ref 0–100)
LDLC/HDLC SERPL: 0.88 {RATIO}
TRIGL SERPL-MCNC: 67 MG/DL (ref 0–150)
VLDLC SERPL-MCNC: 13 MG/DL (ref 5–40)

## 2020-11-16 PROCEDURE — 80061 LIPID PANEL: CPT | Performed by: INTERNAL MEDICINE

## 2020-11-20 ENCOUNTER — RESULTS ENCOUNTER (OUTPATIENT)
Dept: CARDIOLOGY | Facility: CLINIC | Age: 70
End: 2020-11-20

## 2020-11-20 DIAGNOSIS — E78.2 MIXED HYPERLIPIDEMIA: ICD-10-CM

## 2020-12-04 RX ORDER — PRAVASTATIN SODIUM 20 MG
20 TABLET ORAL
Qty: 90 TABLET | Refills: 1 | Status: CANCELLED | OUTPATIENT
Start: 2020-12-04

## 2020-12-07 RX ORDER — PRAVASTATIN SODIUM 20 MG
20 TABLET ORAL
Qty: 90 TABLET | Refills: 3 | Status: SHIPPED | OUTPATIENT
Start: 2020-12-07 | End: 2021-02-25 | Stop reason: SDUPTHER

## 2020-12-07 NOTE — TELEPHONE ENCOUNTER
Updated labwork 11/16/20:  Total Cholesterol   0 - 200 mg/dL 162    Triglycerides   0 - 150 mg/dL 67    HDL Cholesterol   40 - 60 mg/dL 79High     LDL Cholesterol    0 - 100 mg/dL 70    VLDL Cholesterol   5 - 40 mg/dL 13    LDL/HDL Ratio  0.88

## 2020-12-10 RX ORDER — CLOPIDOGREL BISULFATE 75 MG/1
TABLET ORAL
Qty: 90 TABLET | Refills: 3 | Status: SHIPPED | OUTPATIENT
Start: 2020-12-10 | End: 2021-02-25 | Stop reason: SDUPTHER

## 2021-01-07 LAB
CHOLEST SERPL-MCNC: 162 MG/DL (ref 0–200)
HDLC SERPL-MCNC: 79 MG/DL (ref 40–60)
LDLC SERPL CALC-MCNC: 70 MG/DL (ref 0–99)
TRIGL SERPL-MCNC: 67 MG/DL (ref 0–150)
VLDLC SERPL CALC-MCNC: 13 MG/DL

## 2021-01-15 ENCOUNTER — APPOINTMENT (OUTPATIENT)
Dept: MAMMOGRAPHY | Facility: HOSPITAL | Age: 71
End: 2021-01-15

## 2021-02-24 NOTE — PROGRESS NOTES
Encounter Date:02/25/2021      Patient ID: Nallely Mccabe is a 70 y.o. female.    Shay Rivera MD    Chief Complaint: Coronary Artery Disease and HLP      PROBLEM LIST:  Patient Active Problem List    Diagnosis Date Noted   • CAD (coronary artery disease)      Priority: High     Note Last Updated: 11/16/2016     a. ER presentation on 10/18/2011 with ACS/non-ST elevation MI.   b. Cardiac catheterization study by Dr. Chand on 10/18/2011 showed nonobstructive coronary artery disease with 30% plaque of the mid LAD, no other disease, severe LV dysfunction with EF 25% along with diastolic dysfunction.   c. Echocardiogram on 03/02/2012 showed normalization of LV systolic function, LVEF 50% to 55%.    d. Cardiolite stress test, 03/15/2013, showed excellent exercise capacity with normal perfusion scan and an ejection fraction of 58%.    e. A 24-hour Holter monitor, March 2014, showed normal sinus rhythm with occasional PACs and PVCs, no significant arrhythmias.    f. Nuclear perfusion study, 08/06/2015, Kandy Chand MD, revealed no evidence of reversible ischemia and a small mild fixed apical defect consistent with soft tissue attenuation artifact.  Normal LVEF of 77%.     • Hypertension      Priority: Medium   • Hyperlipidemia      Priority: Medium   • Metabolic encephalopathy 05/23/2018   • GERD (gastroesophageal reflux disease)    • Anxiety    • Depression    • Obesity (BMI 30-39.9)            History of Present Illness  Patient presents today for follow-up with a history of CAD, hypertension, dyslipidemia.  She returns today with no complaint of exertional chest pain or dyspnea and orthopnea no PND no claudication no lower extremity edema.  She has no awareness of tachyarrhythmias, no dizziness or syncope.  Her blood pressures at home typically run about 120 mmHg systolic.  She had a recent lipid panel which is noted below and is highly favorable on her current medical regimen.  She states compliance with  "current medical regimen reports no significant adverse side effects.    No Known Allergies    Current Outpatient Medications   Medication Instructions   • acetaminophen (TYLENOL) 500 mg, Oral, As Needed   • ALPRAZolam (XANAX) 1 mg, Oral, 3 Times Daily PRN   • aspirin  mg, Oral, Daily   • benzonatate (TESSALON) 100 mg, Oral, As Needed   • buPROPion XL (WELLBUTRIN XL) 300 mg, Oral, Daily   • carboxymethylcellulose (REFRESH TEARS) 0.5 % solution 2 drops, Both Eyes, 3 Times Daily PRN   • clopidogrel (PLAVIX) 75 MG tablet TAKE 1 TABLET BY MOUTH ONCE DAILY   • diclofenac (VOLTAREN) 4 g, Topical, As Needed   • dicyclomine (BENTYL) 10 mg, Oral, As Needed   • estradiol (VAGIFEM) 10 MCG tablet vaginal tablet 1 tablet, Vaginal, 2 Times Weekly   • fluticasone (FLONASE) 50 MCG/ACT nasal spray 1 spray, Nasal, Daily   • lansoprazole (PREVACID) 30 MG capsule TAKE 1 CAPSULE BY MOUTH TWICE DAILY 20 MINUTES BEFORE MEALS   • losartan-hydrochlorothiazide (Hyzaar) 50-12.5 MG per tablet 1 tablet, Oral, Daily   • meclizine (ANTIVERT) 25 mg, Oral, As Needed   • MegaRed Omega-3 Krill Oil 1,000 mg, Oral, Daily   • Multiple Vitamins-Minerals (PRESERVISION AREDS 2 PO) 1 tablet, Oral, 2 Times Daily   • ondansetron (ZOFRAN) 4 MG tablet take 1 tablet by mouth four times a day if needed for nausea   • pravastatin (PRAVACHOL) 20 mg, Oral, Every Night at Bedtime   • Wheat Dextrin (BENEFIBER PO) Oral         The following portions of the patient's history were reviewed and updated as appropriate: allergies, current medications, past family history, past medical history, past social history, past surgical history and problem list.  .    Objective:     /88   Pulse 84   Ht 165.1 cm (65\")   Wt 87.1 kg (192 lb)   SpO2 98%   BMI 31.95 kg/m²    Body mass index is 31.95 kg/m².     Constitutional:       Appearance: Well-developed.   Pulmonary:      Effort: Pulmonary effort is normal. No respiratory distress.      Breath sounds: Normal breath " sounds. No wheezing. No rales.      Comments: Bases clear  Chest:      Chest wall: Not tender to palpatation.   Cardiovascular:      Normal rate. Regular rhythm.      Murmurs: There is no murmur.      No gallop. No click. No rub.   Pulses:     Intact distal pulses.   Edema:     Peripheral edema absent.   Musculoskeletal: Normal range of motion.         Lab Review:                     No results found for: HGBA1C  Lab Results   Component Value Date    CHOL 162 11/16/2020     Lab Results   Component Value Date    TRIG 67 11/16/2020    TRIG 67 11/16/2020    TRIG 78 07/19/2019     Lab Results   Component Value Date    HDL 79 (H) 11/16/2020    HDL 79 (H) 11/16/2020    HDL 76 07/19/2019     Lab Results   Component Value Date    LDL 70 11/16/2020    LDL 70 11/16/2020    LDL 93 07/19/2019         Procedures             Assessment:      Diagnosis Plan   1. Coronary artery disease involving native coronary artery of native heart without angina pectoris   stable without current anginal symptoms, continue current medical regimen   2. Essential hypertension   well managed on current medical regimen   3. Mixed hyperlipidemia   highly favorable on current medical regimen     Plan:     Stable cardiac status.  Continue current medications.   in 1 year, sooner as needed.  Thank you for allowing us to participate in the care of your patient.     Electronically signed by SWEETIE Dominguez, 02/25/21, 2:12 PM EST.      Please note that portions of this note may have been completed with a voice recognition program. Efforts were made to edit the dictations, but occasionally words are mis-translated.

## 2021-02-25 ENCOUNTER — OFFICE VISIT (OUTPATIENT)
Dept: CARDIOLOGY | Facility: CLINIC | Age: 71
End: 2021-02-25

## 2021-02-25 VITALS
DIASTOLIC BLOOD PRESSURE: 88 MMHG | SYSTOLIC BLOOD PRESSURE: 124 MMHG | BODY MASS INDEX: 31.99 KG/M2 | HEART RATE: 84 BPM | WEIGHT: 192 LBS | HEIGHT: 65 IN | OXYGEN SATURATION: 98 %

## 2021-02-25 DIAGNOSIS — E78.2 MIXED HYPERLIPIDEMIA: ICD-10-CM

## 2021-02-25 DIAGNOSIS — I25.10 CORONARY ARTERY DISEASE INVOLVING NATIVE CORONARY ARTERY OF NATIVE HEART WITHOUT ANGINA PECTORIS: Primary | ICD-10-CM

## 2021-02-25 DIAGNOSIS — I10 ESSENTIAL HYPERTENSION: ICD-10-CM

## 2021-02-25 PROBLEM — A41.9 SEPSIS (HCC): Status: RESOLVED | Noted: 2018-05-23 | Resolved: 2021-02-25

## 2021-02-25 PROBLEM — N17.9 AKI (ACUTE KIDNEY INJURY) (HCC): Status: RESOLVED | Noted: 2018-05-23 | Resolved: 2021-02-25

## 2021-02-25 PROBLEM — N10 PYELONEPHRITIS, ACUTE: Status: RESOLVED | Noted: 2018-05-24 | Resolved: 2021-02-25

## 2021-02-25 PROCEDURE — 99214 OFFICE O/P EST MOD 30 MIN: CPT | Performed by: PHYSICIAN ASSISTANT

## 2021-02-25 RX ORDER — LANSOPRAZOLE 30 MG/1
CAPSULE, DELAYED RELEASE ORAL
COMMUNITY
Start: 2020-12-08

## 2021-02-25 RX ORDER — LOSARTAN POTASSIUM AND HYDROCHLOROTHIAZIDE 12.5; 5 MG/1; MG/1
1 TABLET ORAL DAILY
Qty: 90 TABLET | Refills: 3 | Status: SHIPPED | OUTPATIENT
Start: 2021-02-25 | End: 2021-08-09 | Stop reason: SDUPTHER

## 2021-02-25 RX ORDER — ESTRADIOL 10 UG/1
1 INSERT VAGINAL 2 TIMES WEEKLY
COMMUNITY
Start: 2021-02-08

## 2021-02-25 RX ORDER — CLOPIDOGREL BISULFATE 75 MG/1
75 TABLET ORAL DAILY
Qty: 90 TABLET | Refills: 3 | Status: SHIPPED | OUTPATIENT
Start: 2021-02-25 | End: 2022-02-22 | Stop reason: SDUPTHER

## 2021-02-25 RX ORDER — PRAVASTATIN SODIUM 20 MG
20 TABLET ORAL
Qty: 90 TABLET | Refills: 3 | Status: SHIPPED | OUTPATIENT
Start: 2021-02-25 | End: 2022-03-01 | Stop reason: SDUPTHER

## 2021-05-18 ENCOUNTER — APPOINTMENT (OUTPATIENT)
Dept: MAMMOGRAPHY | Facility: HOSPITAL | Age: 71
End: 2021-05-18

## 2021-05-19 ENCOUNTER — HOSPITAL ENCOUNTER (OUTPATIENT)
Dept: MAMMOGRAPHY | Facility: HOSPITAL | Age: 71
Discharge: HOME | End: 2021-05-19
Admitting: FAMILY MEDICINE

## 2021-05-19 DIAGNOSIS — Z12.31 VISIT FOR SCREENING MAMMOGRAM: ICD-10-CM

## 2021-05-19 PROCEDURE — 77063 BREAST TOMOSYNTHESIS BI: CPT

## 2021-05-19 PROCEDURE — 77067 SCR MAMMO BI INCL CAD: CPT

## 2021-05-19 PROCEDURE — 77063 BREAST TOMOSYNTHESIS BI: CPT | Performed by: RADIOLOGY

## 2021-05-19 PROCEDURE — 77067 SCR MAMMO BI INCL CAD: CPT | Performed by: RADIOLOGY

## 2021-08-09 RX ORDER — LOSARTAN POTASSIUM AND HYDROCHLOROTHIAZIDE 12.5; 5 MG/1; MG/1
1 TABLET ORAL DAILY
Qty: 90 TABLET | Refills: 3 | Status: SHIPPED | OUTPATIENT
Start: 2021-08-09 | End: 2021-11-18 | Stop reason: HOSPADM

## 2021-11-12 ENCOUNTER — APPOINTMENT (OUTPATIENT)
Dept: CT IMAGING | Facility: HOSPITAL | Age: 71
End: 2021-11-12

## 2021-11-12 ENCOUNTER — HOSPITAL ENCOUNTER (INPATIENT)
Facility: HOSPITAL | Age: 71
LOS: 6 days | Discharge: HOME HEALTH | End: 2021-11-18
Attending: EMERGENCY MEDICINE | Admitting: STUDENT IN AN ORGANIZED HEALTH CARE EDUCATION/TRAINING PROGRAM

## 2021-11-12 DIAGNOSIS — N17.9 ACUTE RENAL FAILURE, UNSPECIFIED ACUTE RENAL FAILURE TYPE (HCC): ICD-10-CM

## 2021-11-12 DIAGNOSIS — R29.6 FREQUENT FALLS: ICD-10-CM

## 2021-11-12 DIAGNOSIS — J90 PLEURAL EFFUSION ON LEFT: ICD-10-CM

## 2021-11-12 DIAGNOSIS — E87.1 ACUTE HYPONATREMIA: Primary | ICD-10-CM

## 2021-11-12 DIAGNOSIS — N39.0 ACUTE URINARY TRACT INFECTION: ICD-10-CM

## 2021-11-12 DIAGNOSIS — S22.42XA CLOSED FRACTURE OF MULTIPLE RIBS OF LEFT SIDE, INITIAL ENCOUNTER: ICD-10-CM

## 2021-11-12 PROBLEM — S22.49XA RIB FRACTURES: Status: ACTIVE | Noted: 2021-11-12
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
## 2021-11-12 PROBLEM — R82.71 BACTERIURIA: Status: ACTIVE | Noted: 2021-11-12

## 2021-11-12 LAB
ALBUMIN SERPL-MCNC: 4 G/DL (ref 3.5–5.2)
ALBUMIN/GLOB SERPL: 1.5 G/DL
ALP SERPL-CCNC: 113 U/L (ref 39–117)
ALT SERPL W P-5'-P-CCNC: 16 U/L (ref 1–33)
ANION GAP SERPL CALCULATED.3IONS-SCNC: 14 MMOL/L (ref 5–15)
APTT PPP: 35.9 SECONDS (ref 22–39)
AST SERPL-CCNC: 21 U/L (ref 1–32)
BACTERIA UR QL AUTO: ABNORMAL /HPF
BASOPHILS # BLD AUTO: 0.02 10*3/MM3 (ref 0–0.2)
BASOPHILS NFR BLD AUTO: 0.2 % (ref 0–1.5)
BILIRUB SERPL-MCNC: 0.4 MG/DL (ref 0–1.2)
BILIRUB UR QL STRIP: NEGATIVE
BUN SERPL-MCNC: 16 MG/DL (ref 8–23)
BUN/CREAT SERPL: 8.7 (ref 7–25)
CALCIUM SPEC-SCNC: 9.2 MG/DL (ref 8.6–10.5)
CHLORIDE SERPL-SCNC: 79 MMOL/L (ref 98–107)
CLARITY UR: CLEAR
CO2 SERPL-SCNC: 22 MMOL/L (ref 22–29)
COLOR UR: YELLOW
CREAT SERPL-MCNC: 1.83 MG/DL (ref 0.57–1)
D-LACTATE SERPL-SCNC: 1.6 MMOL/L (ref 0.5–2)
DEPRECATED RDW RBC AUTO: 44.4 FL (ref 37–54)
EOSINOPHIL # BLD AUTO: 0.01 10*3/MM3 (ref 0–0.4)
EOSINOPHIL NFR BLD AUTO: 0.1 % (ref 0.3–6.2)
ERYTHROCYTE [DISTWIDTH] IN BLOOD BY AUTOMATED COUNT: 14.4 % (ref 12.3–15.4)
GFR SERPL CREATININE-BSD FRML MDRD: 27 ML/MIN/1.73
GLOBULIN UR ELPH-MCNC: 2.6 GM/DL
GLUCOSE SERPL-MCNC: 107 MG/DL (ref 65–99)
GLUCOSE UR STRIP-MCNC: NEGATIVE MG/DL
HCT VFR BLD AUTO: 33.6 % (ref 34–46.6)
HGB BLD-MCNC: 11.9 G/DL (ref 12–15.9)
HGB UR QL STRIP.AUTO: NEGATIVE
HYALINE CASTS UR QL AUTO: ABNORMAL /LPF
IMM GRANULOCYTES # BLD AUTO: 0.06 10*3/MM3 (ref 0–0.05)
IMM GRANULOCYTES NFR BLD AUTO: 0.5 % (ref 0–0.5)
INR PPP: 0.96 (ref 0.85–1.16)
KETONES UR QL STRIP: ABNORMAL
LEUKOCYTE ESTERASE UR QL STRIP.AUTO: ABNORMAL
LYMPHOCYTES # BLD AUTO: 0.74 10*3/MM3 (ref 0.7–3.1)
LYMPHOCYTES NFR BLD AUTO: 6.1 % (ref 19.6–45.3)
MCH RBC QN AUTO: 29.7 PG (ref 26.6–33)
MCHC RBC AUTO-ENTMCNC: 35.4 G/DL (ref 31.5–35.7)
MCV RBC AUTO: 83.8 FL (ref 79–97)
MONOCYTES # BLD AUTO: 1.19 10*3/MM3 (ref 0.1–0.9)
MONOCYTES NFR BLD AUTO: 9.9 % (ref 5–12)
NEUTROPHILS NFR BLD AUTO: 10.05 10*3/MM3 (ref 1.7–7)
NEUTROPHILS NFR BLD AUTO: 83.2 % (ref 42.7–76)
NITRITE UR QL STRIP: NEGATIVE
NRBC BLD AUTO-RTO: 0 /100 WBC (ref 0–0.2)
OSMOLALITY SERPL: 248 MOSM/KG (ref 275–295)
OSMOLALITY UR: 200 MOSM/KG (ref 300–1100)
PH UR STRIP.AUTO: <=5 [PH] (ref 5–8)
PLATELET # BLD AUTO: 256 10*3/MM3 (ref 140–450)
PMV BLD AUTO: 8.8 FL (ref 6–12)
POTASSIUM SERPL-SCNC: 4.4 MMOL/L (ref 3.5–5.2)
PROT SERPL-MCNC: 6.6 G/DL (ref 6–8.5)
PROT UR QL STRIP: NEGATIVE
PROTHROMBIN TIME: 12.5 SECONDS (ref 11.4–14.4)
RBC # BLD AUTO: 4.01 10*6/MM3 (ref 3.77–5.28)
RBC # UR: ABNORMAL /HPF
REF LAB TEST METHOD: ABNORMAL
SARS-COV-2 RDRP RESP QL NAA+PROBE: NORMAL
SODIUM SERPL-SCNC: 115 MMOL/L (ref 136–145)
SODIUM SERPL-SCNC: 123 MMOL/L (ref 136–145)
SODIUM UR-SCNC: <20 MMOL/L
SP GR UR STRIP: 1.01 (ref 1–1.03)
SQUAMOUS #/AREA URNS HPF: ABNORMAL /HPF
TROPONIN T SERPL-MCNC: <0.01 NG/ML (ref 0–0.03)
UROBILINOGEN UR QL STRIP: ABNORMAL
WBC # BLD AUTO: 12.07 10*3/MM3 (ref 3.4–10.8)
WBC UR QL AUTO: ABNORMAL /HPF

## 2021-11-12 PROCEDURE — 99223 1ST HOSP IP/OBS HIGH 75: CPT | Performed by: HOSPITALIST

## 2021-11-12 PROCEDURE — 85730 THROMBOPLASTIN TIME PARTIAL: CPT | Performed by: EMERGENCY MEDICINE

## 2021-11-12 PROCEDURE — 25010000002 MORPHINE PER 10 MG: Performed by: EMERGENCY MEDICINE

## 2021-11-12 PROCEDURE — 84300 ASSAY OF URINE SODIUM: CPT | Performed by: NURSE PRACTITIONER

## 2021-11-12 PROCEDURE — 93005 ELECTROCARDIOGRAM TRACING: CPT | Performed by: EMERGENCY MEDICINE

## 2021-11-12 PROCEDURE — 85610 PROTHROMBIN TIME: CPT | Performed by: EMERGENCY MEDICINE

## 2021-11-12 PROCEDURE — 83605 ASSAY OF LACTIC ACID: CPT | Performed by: EMERGENCY MEDICINE

## 2021-11-12 PROCEDURE — 72125 CT NECK SPINE W/O DYE: CPT

## 2021-11-12 PROCEDURE — 71250 CT THORAX DX C-: CPT

## 2021-11-12 PROCEDURE — 81001 URINALYSIS AUTO W/SCOPE: CPT | Performed by: EMERGENCY MEDICINE

## 2021-11-12 PROCEDURE — 87086 URINE CULTURE/COLONY COUNT: CPT | Performed by: EMERGENCY MEDICINE

## 2021-11-12 PROCEDURE — 70450 CT HEAD/BRAIN W/O DYE: CPT

## 2021-11-12 PROCEDURE — 36415 COLL VENOUS BLD VENIPUNCTURE: CPT

## 2021-11-12 PROCEDURE — 84484 ASSAY OF TROPONIN QUANT: CPT | Performed by: EMERGENCY MEDICINE

## 2021-11-12 PROCEDURE — 87635 SARS-COV-2 COVID-19 AMP PRB: CPT | Performed by: EMERGENCY MEDICINE

## 2021-11-12 PROCEDURE — 99285 EMERGENCY DEPT VISIT HI MDM: CPT

## 2021-11-12 PROCEDURE — 84295 ASSAY OF SERUM SODIUM: CPT | Performed by: NURSE PRACTITIONER

## 2021-11-12 PROCEDURE — 25010000002 CEFTRIAXONE PER 250 MG: Performed by: EMERGENCY MEDICINE

## 2021-11-12 PROCEDURE — 83935 ASSAY OF URINE OSMOLALITY: CPT | Performed by: NURSE PRACTITIONER

## 2021-11-12 PROCEDURE — 87040 BLOOD CULTURE FOR BACTERIA: CPT | Performed by: EMERGENCY MEDICINE

## 2021-11-12 PROCEDURE — 80053 COMPREHEN METABOLIC PANEL: CPT | Performed by: EMERGENCY MEDICINE

## 2021-11-12 PROCEDURE — 83930 ASSAY OF BLOOD OSMOLALITY: CPT | Performed by: NURSE PRACTITIONER

## 2021-11-12 PROCEDURE — P9612 CATHETERIZE FOR URINE SPEC: HCPCS

## 2021-11-12 PROCEDURE — 72131 CT LUMBAR SPINE W/O DYE: CPT

## 2021-11-12 PROCEDURE — 72128 CT CHEST SPINE W/O DYE: CPT

## 2021-11-12 PROCEDURE — 85025 COMPLETE CBC W/AUTO DIFF WBC: CPT | Performed by: EMERGENCY MEDICINE

## 2021-11-12 RX ORDER — MORPHINE SULFATE 2 MG/ML
2 INJECTION, SOLUTION INTRAMUSCULAR; INTRAVENOUS ONCE
Status: COMPLETED | OUTPATIENT
Start: 2021-11-12 | End: 2021-11-12

## 2021-11-12 RX ORDER — SODIUM CHLORIDE 0.9 % (FLUSH) 0.9 %
10 SYRINGE (ML) INJECTION AS NEEDED
Status: DISCONTINUED | OUTPATIENT
Start: 2021-11-12 | End: 2021-11-18 | Stop reason: HOSPADM

## 2021-11-12 RX ADMIN — SODIUM CHLORIDE 1000 ML: 9 INJECTION, SOLUTION INTRAVENOUS at 18:00

## 2021-11-12 RX ADMIN — SODIUM CHLORIDE 2 G: 900 INJECTION INTRAVENOUS at 22:05

## 2021-11-12 RX ADMIN — MORPHINE SULFATE 2 MG: 2 INJECTION, SOLUTION INTRAMUSCULAR; INTRAVENOUS at 22:06

## 2021-11-12 NOTE — ED PROVIDER NOTES
Subjective   71-year-old female who presents for evaluation after 2 recent falls.  Patient reports that she was evaluated by her primary care physician on the 10th, 2 days ago, and suffered a fall as she was leaving the office.  She reports striking her left rib cage whenever she fell.  She was evaluated by EMS and by her primary care physician and ultimately allowed to drive home at that given time at her insistence.  The patient sister-in-law has been looking over her recently.  However the sister-in-law left earlier today, and the son went to stay with her and found her down on the ground today.  This prompted the current presentation to the ER.  Her mentation is normal and she answers questions appropriately.  The son likewise confirms that mentation seems to be consistent with baseline.  She has been identified to have bruising and swelling over the left posterior scalp and significant bruising over the left posterior thoracic cage secondary to recent falls.  She also has multiple small bruises over her skin throughout the bilateral upper and lower extremities.  However she states that she can simply get touched and developed bruising.  She denies falls contributing to the bruising to her upper and lower extremities.  She denies any focal pain to the bilateral upper or lower extremities.  She denies chest pain or abdominal pain.  She denies cough or shortness of breath.  No acute infectious symptoms include no fever, body aches, or chills.  No reported change in bowel function including no blood in the stool or diarrhea.  She does not take any anticoagulation.  She denies any urinary symptoms include no dysuria, frequency, or urgency.  The son however does report the patient has a history of requiring admission for urosepsis in the past.  She has not been diagnosed with COVID-19 and has been vaccinated against COVID-19.  No other acute complaints.  She does state that her sodium level was recently identified to  be low by her primary care physician, I do not have those records available for review at this time.  States that one of her blood pressure medications was discontinued because of that low sodium level.          Review of Systems   Constitutional: Positive for fatigue. Negative for chills and fever.   HENT: Negative for congestion, ear pain, postnasal drip, sinus pressure and sore throat.    Eyes: Negative for pain, redness and visual disturbance.   Respiratory: Negative for cough, chest tightness and shortness of breath.    Cardiovascular: Positive for chest pain (left lateral chest wall pain from trauma). Negative for palpitations and leg swelling.   Gastrointestinal: Negative for abdominal pain, anal bleeding, blood in stool, diarrhea, nausea and vomiting.   Endocrine: Negative for polydipsia and polyuria.   Genitourinary: Negative for difficulty urinating, dysuria, frequency and urgency.   Musculoskeletal: Negative for arthralgias, back pain and neck pain.   Skin: Positive for color change and wound. Negative for pallor and rash.   Allergic/Immunologic: Negative for environmental allergies and immunocompromised state.   Neurological: Positive for headaches. Negative for dizziness and weakness.   Hematological: Negative for adenopathy.   Psychiatric/Behavioral: Negative for confusion, self-injury and suicidal ideas. The patient is not nervous/anxious.    All other systems reviewed and are negative.      Past Medical History:   Diagnosis Date   • Anxiety    • CAD (coronary artery disease)    • Depression    • GERD (gastroesophageal reflux disease)    • Hyperlipidemia    • Hypertension    • Obesity (BMI 30.0-34.9)    • Pyelonephritis    • Pyelonephritis, acute 5/24/2018    Right sided   • Sepsis (HCC)    • Sepsis (HCC) 5/23/2018   • UTI (urinary tract infection)        No Known Allergies    Past Surgical History:   Procedure Laterality Date   • CORONARY ANGIOPLASTY  10/18/2011   • EYE SURGERY     • ORIF HUMERUS  FRACTURE     • SINUS SURGERY         Family History   Problem Relation Age of Onset   • Diabetes Mother    • Kidney failure Mother    • Heart attack Father    • Arthritis Sister    • Blindness Sister    • Breast cancer Neg Hx    • Ovarian cancer Neg Hx        Social History     Socioeconomic History   • Marital status:    Tobacco Use   • Smoking status: Never Smoker   • Smokeless tobacco: Never Used   Substance and Sexual Activity   • Alcohol use: No   • Drug use: Defer   • Sexual activity: Defer           Objective   Physical Exam  Vitals and nursing note reviewed.   Constitutional:       General: She is not in acute distress.     Appearance: Normal appearance. She is well-developed. She is not toxic-appearing or diaphoretic.   HENT:      Head: Normocephalic and atraumatic.        Right Ear: External ear normal.      Left Ear: External ear normal.      Nose: Nose normal.   Eyes:      General: Lids are normal.      Pupils: Pupils are equal, round, and reactive to light.   Neck:      Trachea: No tracheal deviation.   Cardiovascular:      Rate and Rhythm: Normal rate and regular rhythm.      Pulses: No decreased pulses.      Heart sounds: Normal heart sounds. No murmur heard.  No friction rub. No gallop.    Pulmonary:      Effort: Pulmonary effort is normal. No respiratory distress.      Breath sounds: Normal breath sounds. No decreased breath sounds, wheezing, rhonchi or rales.       Abdominal:      General: Bowel sounds are normal.      Palpations: Abdomen is soft.      Tenderness: There is no abdominal tenderness. There is no guarding or rebound.   Musculoskeletal:         General: No deformity. Normal range of motion.      Cervical back: Normal range of motion and neck supple.   Lymphadenopathy:      Cervical: No cervical adenopathy.   Skin:     General: Skin is warm and dry.      Findings: No rash.          Neurological:      Mental Status: She is alert and oriented to person, place, and time.      Cranial  Nerves: No cranial nerve deficit.      Sensory: No sensory deficit.   Psychiatric:         Speech: Speech normal.         Behavior: Behavior normal.         Thought Content: Thought content normal.         Judgment: Judgment normal.         Critical Care  Performed by: Holger Lazo MD  Authorized by: Holger Lazo MD     Critical care provider statement:     Critical care time (minutes):  35    Critical care time was exclusive of:  Separately billable procedures and treating other patients    Critical care was necessary to treat or prevent imminent or life-threatening deterioration of the following conditions:  Dehydration and metabolic crisis    Critical care was time spent personally by me on the following activities:  Development of treatment plan with patient or surrogate, discussions with consultants, evaluation of patient's response to treatment, examination of patient, obtaining history from patient or surrogate, ordering and performing treatments and interventions, ordering and review of laboratory studies, ordering and review of radiographic studies, pulse oximetry, re-evaluation of patient's condition and review of old charts               ED Course                                           MDM  Number of Diagnoses or Management Options  Acute hyponatremia: new and requires workup  Acute renal failure, unspecified acute renal failure type (HCC): new and requires workup  Acute urinary tract infection: new and requires workup  Closed fracture of multiple ribs of left side, initial encounter: new and requires workup  Frequent falls: new and requires workup  Pleural effusion on left: new and requires workup  Diagnosis management comments: Patient has a sodium of 115 which I feel is contributing to generalized weakness and frequent falls.    CT imaging of the chest shows multiple left-sided rib fracture with small pleural effusion.  CT scan of the head does not show any intracranial  injury.    The patient's mentation is normal and she does not have focal neurologic deficits just generalized weakness.    I have ordered 2 L of IV fluid.    Urine shows moderate leukoesterase, will cover with a course of Rocephin.    Discussed the patient with the hospitalist, Dr. Patel, who will admit.    On repeat discussion with the patient with the patient's son it was discovered that losartan was considered the call for the patient's low sodium level in the past.  The patient reinitiated losartan on her own approximately 3 weeks ago, this most likely is contributing to the current hyponatremia.    The son reports that on October 10 she was diagnosed with low sodium level, she was discontinued off losartan, and a repeat blood test was performed 1 week ago that showed that the sodium level was improved.  Approximately 1 week after that she reinitiated the losartan due to intermittent spikes of blood pressure up to 155.       Amount and/or Complexity of Data Reviewed  Clinical lab tests: ordered and reviewed  Tests in the radiology section of CPT®: ordered and reviewed  Decide to obtain previous medical records or to obtain history from someone other than the patient: yes  Obtain history from someone other than the patient: yes  Review and summarize past medical records: yes  Discuss the patient with other providers: yes  Independent visualization of images, tracings, or specimens: yes        Final diagnoses:   Acute hyponatremia   Frequent falls   Acute renal failure, unspecified acute renal failure type (HCC)   Closed fracture of multiple ribs of left side, initial encounter   Pleural effusion on left   Acute urinary tract infection       ED Disposition  ED Disposition     ED Disposition Condition Comment    Decision to Admit  Level of Care: Telemetry [5]   Diagnosis: Acute hyponatremia [133392]   Admitting Physician: ERICKA PATEL [[id number]]   Attending Physician: ERICKA PATEL [[id number]]   Isolate for  COVID?: No [0]   Certification: I Certify That Inpatient Hospital Services Are Medically Necessary For Greater Than 2 Midnights            No follow-up provider specified.       Medication List      No changes were made to your prescriptions during this visit.          Holger Lazo MD  11/12/21 1905       Holger Lazo MD  11/12/21 1914

## 2021-11-13 LAB
ANION GAP SERPL CALCULATED.3IONS-SCNC: 13 MMOL/L (ref 5–15)
BASOPHILS # BLD AUTO: 0.04 10*3/MM3 (ref 0–0.2)
BASOPHILS NFR BLD AUTO: 0.5 % (ref 0–1.5)
BUN SERPL-MCNC: 15 MG/DL (ref 8–23)
BUN/CREAT SERPL: 8.6 (ref 7–25)
CALCIUM SPEC-SCNC: 9 MG/DL (ref 8.6–10.5)
CHLORIDE SERPL-SCNC: 86 MMOL/L (ref 98–107)
CO2 SERPL-SCNC: 22 MMOL/L (ref 22–29)
CREAT SERPL-MCNC: 1.74 MG/DL (ref 0.57–1)
DEPRECATED RDW RBC AUTO: 42.5 FL (ref 37–54)
EOSINOPHIL # BLD AUTO: 0.06 10*3/MM3 (ref 0–0.4)
EOSINOPHIL NFR BLD AUTO: 0.7 % (ref 0.3–6.2)
ERYTHROCYTE [DISTWIDTH] IN BLOOD BY AUTOMATED COUNT: 14.2 % (ref 12.3–15.4)
GFR SERPL CREATININE-BSD FRML MDRD: 29 ML/MIN/1.73
GLUCOSE SERPL-MCNC: 114 MG/DL (ref 65–99)
HCT VFR BLD AUTO: 32.8 % (ref 34–46.6)
HGB BLD-MCNC: 11.6 G/DL (ref 12–15.9)
IMM GRANULOCYTES # BLD AUTO: 0.05 10*3/MM3 (ref 0–0.05)
IMM GRANULOCYTES NFR BLD AUTO: 0.6 % (ref 0–0.5)
LYMPHOCYTES # BLD AUTO: 1.2 10*3/MM3 (ref 0.7–3.1)
LYMPHOCYTES NFR BLD AUTO: 13.9 % (ref 19.6–45.3)
MCH RBC QN AUTO: 29.1 PG (ref 26.6–33)
MCHC RBC AUTO-ENTMCNC: 35.4 G/DL (ref 31.5–35.7)
MCV RBC AUTO: 82.4 FL (ref 79–97)
MONOCYTES # BLD AUTO: 1.15 10*3/MM3 (ref 0.1–0.9)
MONOCYTES NFR BLD AUTO: 13.3 % (ref 5–12)
NEUTROPHILS NFR BLD AUTO: 6.12 10*3/MM3 (ref 1.7–7)
NEUTROPHILS NFR BLD AUTO: 71 % (ref 42.7–76)
NRBC BLD AUTO-RTO: 0 /100 WBC (ref 0–0.2)
PLATELET # BLD AUTO: 272 10*3/MM3 (ref 140–450)
PMV BLD AUTO: 9.1 FL (ref 6–12)
POTASSIUM SERPL-SCNC: 3.9 MMOL/L (ref 3.5–5.2)
RBC # BLD AUTO: 3.98 10*6/MM3 (ref 3.77–5.28)
SODIUM SERPL-SCNC: 118 MMOL/L (ref 136–145)
SODIUM SERPL-SCNC: 120 MMOL/L (ref 136–145)
SODIUM SERPL-SCNC: 121 MMOL/L (ref 136–145)
WBC # BLD AUTO: 8.62 10*3/MM3 (ref 3.4–10.8)

## 2021-11-13 PROCEDURE — 85025 COMPLETE CBC W/AUTO DIFF WBC: CPT | Performed by: NURSE PRACTITIONER

## 2021-11-13 PROCEDURE — 99232 SBSQ HOSP IP/OBS MODERATE 35: CPT | Performed by: INTERNAL MEDICINE

## 2021-11-13 PROCEDURE — 80048 BASIC METABOLIC PNL TOTAL CA: CPT | Performed by: NURSE PRACTITIONER

## 2021-11-13 PROCEDURE — 84295 ASSAY OF SERUM SODIUM: CPT | Performed by: NURSE PRACTITIONER

## 2021-11-13 PROCEDURE — 25010000002 HEPARIN (PORCINE) PER 1000 UNITS: Performed by: NURSE PRACTITIONER

## 2021-11-13 PROCEDURE — 25010000002 CEFTRIAXONE PER 250 MG: Performed by: INTERNAL MEDICINE

## 2021-11-13 RX ORDER — PANTOPRAZOLE SODIUM 40 MG/1
40 TABLET, DELAYED RELEASE ORAL DAILY
Status: DISCONTINUED | OUTPATIENT
Start: 2021-11-13 | End: 2021-11-18 | Stop reason: HOSPADM

## 2021-11-13 RX ORDER — CLOPIDOGREL BISULFATE 75 MG/1
75 TABLET ORAL DAILY
Status: DISCONTINUED | OUTPATIENT
Start: 2021-11-13 | End: 2021-11-18 | Stop reason: HOSPADM

## 2021-11-13 RX ORDER — ACETAMINOPHEN 650 MG/1
650 SUPPOSITORY RECTAL EVERY 4 HOURS PRN
Status: DISCONTINUED | OUTPATIENT
Start: 2021-11-13 | End: 2021-11-18 | Stop reason: HOSPADM

## 2021-11-13 RX ORDER — SODIUM CHLORIDE 0.9 % (FLUSH) 0.9 %
10 SYRINGE (ML) INJECTION AS NEEDED
Status: DISCONTINUED | OUTPATIENT
Start: 2021-11-13 | End: 2021-11-18 | Stop reason: HOSPADM

## 2021-11-13 RX ORDER — ASPIRIN 325 MG
325 TABLET, DELAYED RELEASE (ENTERIC COATED) ORAL DAILY
Status: DISCONTINUED | OUTPATIENT
Start: 2021-11-13 | End: 2021-11-18 | Stop reason: HOSPADM

## 2021-11-13 RX ORDER — PRAVASTATIN SODIUM 20 MG
20 TABLET ORAL NIGHTLY
Status: DISCONTINUED | OUTPATIENT
Start: 2021-11-13 | End: 2021-11-18 | Stop reason: HOSPADM

## 2021-11-13 RX ORDER — DEXTROSE MONOHYDRATE 50 MG/ML
75 INJECTION, SOLUTION INTRAVENOUS CONTINUOUS
Status: DISCONTINUED | OUTPATIENT
Start: 2021-11-13 | End: 2021-11-13

## 2021-11-13 RX ORDER — ACETAMINOPHEN 325 MG/1
650 TABLET ORAL EVERY 4 HOURS PRN
Status: DISCONTINUED | OUTPATIENT
Start: 2021-11-13 | End: 2021-11-18 | Stop reason: HOSPADM

## 2021-11-13 RX ORDER — ALPRAZOLAM 1 MG/1
1 TABLET ORAL 3 TIMES DAILY PRN
Status: DISCONTINUED | OUTPATIENT
Start: 2021-11-13 | End: 2021-11-18 | Stop reason: HOSPADM

## 2021-11-13 RX ORDER — BUPROPION HYDROCHLORIDE 150 MG/1
300 TABLET ORAL DAILY
Status: DISCONTINUED | OUTPATIENT
Start: 2021-11-13 | End: 2021-11-18 | Stop reason: HOSPADM

## 2021-11-13 RX ORDER — SODIUM CHLORIDE 9 MG/ML
75 INJECTION, SOLUTION INTRAVENOUS CONTINUOUS
Status: DISCONTINUED | OUTPATIENT
Start: 2021-11-13 | End: 2021-11-13

## 2021-11-13 RX ORDER — BENZONATATE 100 MG/1
100 CAPSULE ORAL 3 TIMES DAILY PRN
Status: DISCONTINUED | OUTPATIENT
Start: 2021-11-13 | End: 2021-11-18 | Stop reason: HOSPADM

## 2021-11-13 RX ORDER — ACETAMINOPHEN 160 MG/5ML
650 SOLUTION ORAL EVERY 4 HOURS PRN
Status: DISCONTINUED | OUTPATIENT
Start: 2021-11-13 | End: 2021-11-18 | Stop reason: HOSPADM

## 2021-11-13 RX ORDER — SODIUM CHLORIDE 0.9 % (FLUSH) 0.9 %
10 SYRINGE (ML) INJECTION EVERY 12 HOURS SCHEDULED
Status: DISCONTINUED | OUTPATIENT
Start: 2021-11-13 | End: 2021-11-18 | Stop reason: HOSPADM

## 2021-11-13 RX ORDER — FLUTICASONE PROPIONATE 50 MCG
1 SPRAY, SUSPENSION (ML) NASAL DAILY
Status: DISCONTINUED | OUTPATIENT
Start: 2021-11-13 | End: 2021-11-18 | Stop reason: HOSPADM

## 2021-11-13 RX ORDER — HEPARIN SODIUM 5000 [USP'U]/ML
5000 INJECTION, SOLUTION INTRAVENOUS; SUBCUTANEOUS EVERY 8 HOURS SCHEDULED
Status: DISCONTINUED | OUTPATIENT
Start: 2021-11-13 | End: 2021-11-13

## 2021-11-13 RX ADMIN — ACETAMINOPHEN 650 MG: 325 TABLET, FILM COATED ORAL at 01:00

## 2021-11-13 RX ADMIN — ALPRAZOLAM 1 MG: 1 TABLET ORAL at 20:12

## 2021-11-13 RX ADMIN — HEPARIN SODIUM 5000 UNITS: 5000 INJECTION, SOLUTION INTRAVENOUS; SUBCUTANEOUS at 05:56

## 2021-11-13 RX ADMIN — DEXTROSE MONOHYDRATE 75 ML/HR: 50 INJECTION, SOLUTION INTRAVENOUS at 03:48

## 2021-11-13 RX ADMIN — SODIUM CHLORIDE, PRESERVATIVE FREE 10 ML: 5 INJECTION INTRAVENOUS at 01:00

## 2021-11-13 RX ADMIN — ALPRAZOLAM 1 MG: 1 TABLET ORAL at 01:00

## 2021-11-13 RX ADMIN — ACETAMINOPHEN 650 MG: 325 TABLET, FILM COATED ORAL at 20:12

## 2021-11-13 RX ADMIN — CLOPIDOGREL BISULFATE 75 MG: 75 TABLET ORAL at 08:41

## 2021-11-13 RX ADMIN — SODIUM CHLORIDE 1 G: 900 INJECTION INTRAVENOUS at 20:12

## 2021-11-13 RX ADMIN — PRAVASTATIN SODIUM 20 MG: 20 TABLET ORAL at 20:12

## 2021-11-13 RX ADMIN — ACETAMINOPHEN ORAL SOLUTION 650 MG: 650 SOLUTION ORAL at 08:40

## 2021-11-13 RX ADMIN — BUPROPION HYDROCHLORIDE 300 MG: 150 TABLET, FILM COATED, EXTENDED RELEASE ORAL at 08:42

## 2021-11-13 RX ADMIN — ASPIRIN 325 MG: 325 TABLET, COATED ORAL at 08:43

## 2021-11-13 RX ADMIN — SODIUM CHLORIDE 75 ML/HR: 9 INJECTION, SOLUTION INTRAVENOUS at 01:00

## 2021-11-13 RX ADMIN — SODIUM CHLORIDE, PRESERVATIVE FREE 10 ML: 5 INJECTION INTRAVENOUS at 20:13

## 2021-11-13 RX ADMIN — SODIUM CHLORIDE, PRESERVATIVE FREE 10 ML: 5 INJECTION INTRAVENOUS at 08:44

## 2021-11-13 RX ADMIN — PRAVASTATIN SODIUM 20 MG: 20 TABLET ORAL at 01:00

## 2021-11-13 RX ADMIN — ALPRAZOLAM 1 MG: 1 TABLET ORAL at 09:00

## 2021-11-13 RX ADMIN — FLUTICASONE PROPIONATE 1 SPRAY: 50 SPRAY, METERED NASAL at 11:45

## 2021-11-13 RX ADMIN — PANTOPRAZOLE SODIUM 40 MG: 40 TABLET, DELAYED RELEASE ORAL at 08:41

## 2021-11-13 NOTE — H&P
"    [facility] Medicine Services  HISTORY AND PHYSICAL    Patient Name: Nallely Mccabe  : 1950  MRN: [id number]  Primary Care Physician: Shay Rivera MD  Date of admission: 2021    Subjective   Subjective     Chief Complaint:  S/p fall, confusion     HPI:  Nallely Mccabe is a 71 y.o. female with a past medical history significant for generalized anxiety, CAD, depression, hyperlipidemia and essential hypertension presents to the ED due to a fall and confusion.  Patient reports that she fell coming out of her PCP office a few days ago and suffered rib fractures.  Yesterday she reports her sister came to visit her and offered a flexaril tablet to help with her rib pain.  The patient took her flexaril along with her xanax last night and today.  This morning she reports she fell due to her right leg \"turning inwards\" landing forward on her floor.  Son at bedside reports he went to her home and found her down.  She was awake and alert.  He noticed she had a knot on her head.  Son reports throughout the day today she would keep repeating his phone number therefore he decided to bring her in for further evaluation.  Patient denies any recent fever, chills, nausea, vomiting, diarrhea, chest pain, cough, dizziness, shortness of air or lightheadedness.  She does however report being diagnosed with a UTI and has two days left of bactrim.  Workup in the ED tonight reveals a sodium of 115.  Patient reports her sodium had been low around Oct 11th and her PCP stopped her losartan-HCTZ and her sodium improved.  She reports she continued not to take the BP medication but over the past week she has noticed her BP has increased therefore she restarted approximately a week ago.        COVID Details:    Symptoms:    [x] NONE [] Fever []  Cough [] Shortness of breath [] Change in taste/smell      The patient has a COVID HM Topic on their chart, and they are fully vaccinated.      Review of " Systems   Constitutional: Negative for activity change, appetite change, chills, diaphoresis, fatigue, fever and unexpected weight change.   HENT: Negative.    Eyes: Negative for photophobia and visual disturbance.   Respiratory: Negative for cough and shortness of breath.    Cardiovascular: Negative for chest pain, palpitations and leg swelling.   Gastrointestinal: Negative for abdominal distention, abdominal pain, anal bleeding, constipation, diarrhea, nausea and vomiting.   Genitourinary: Positive for flank pain.   Musculoskeletal: Negative for neck pain and neck stiffness.   Skin: Negative.    Neurological: Negative for dizziness, syncope, facial asymmetry, speech difficulty, weakness, light-headedness, numbness and headaches.   Psychiatric/Behavioral: Positive for confusion.     All other systems reviewed and are negative.     Personal History     Past Medical History:   Diagnosis Date   • Anxiety    • CAD (coronary artery disease)    • Depression    • GERD (gastroesophageal reflux disease)    • Hyperlipidemia    • Hypertension    • Obesity (BMI 30.0-34.9)    • Pyelonephritis    • Pyelonephritis, acute 5/24/2018    Right sided   • Sepsis (HCC)    • Sepsis (HCC) 5/23/2018   • UTI (urinary tract infection)        Past Surgical History:   Procedure Laterality Date   • CORONARY ANGIOPLASTY  10/18/2011   • EYE SURGERY     • ORIF HUMERUS FRACTURE     • SINUS SURGERY         Family History: family history includes Arthritis in her sister; Blindness in her sister; Diabetes in her mother; Heart attack in her father; Kidney failure in her mother. Otherwise pertinent FHx was reviewed and unremarkable.     Social History:  reports that she has never smoked. She has never used smokeless tobacco. Drug use questions deferred to the physician. She reports that she does not drink alcohol.  Social History     Social History Narrative   • Not on file       Medications:  ALPRAZolam, MegaRed Omega-3 Krill Oil, Multiple  Vitamins-Minerals, Wheat Dextrin, acetaminophen, aspirin EC, benzonatate, buPROPion XL, carboxymethylcellulose, clopidogrel, diclofenac, dicyclomine, estradiol, fluticasone, lansoprazole, losartan-hydrochlorothiazide, meclizine, ondansetron, and pravastatin    No Known Allergies    Objective   Objective     Vital Signs:   Temp:  [98.2 °F (36.8 °C)] 98.2 °F (36.8 °C)  Heart Rate:  [93-95] 93  Resp:  [16] 16  BP: ()/(82-90) 111/90    Physical Exam  Vitals and nursing note reviewed.   Constitutional:       General: She is not in acute distress.     Appearance: Normal appearance. She is not ill-appearing, toxic-appearing or diaphoretic.   HENT:      Head: Normocephalic and atraumatic.      Nose: Nose normal.      Mouth/Throat:      Mouth: Mucous membranes are dry.      Pharynx: Oropharynx is clear.   Eyes:      Extraocular Movements: Extraocular movements intact.      Pupils: Pupils are equal, round, and reactive to light.   Cardiovascular:      Rate and Rhythm: Normal rate and regular rhythm.      Pulses: Normal pulses.      Heart sounds: Normal heart sounds.   Pulmonary:      Effort: Pulmonary effort is normal.      Breath sounds: Normal breath sounds.   Abdominal:      General: Bowel sounds are normal. There is no distension.      Palpations: Abdomen is soft. There is no mass.      Tenderness: There is no abdominal tenderness. There is no right CVA tenderness, guarding or rebound.      Hernia: No hernia is present.   Musculoskeletal:         General: No swelling, tenderness, deformity or signs of injury. Normal range of motion.      Cervical back: Normal range of motion and neck supple.      Right lower leg: No edema.      Left lower leg: No edema.   Skin:     General: Skin is warm and dry.   Neurological:      General: No focal deficit present.      Mental Status: She is alert and oriented to person, place, and time.   Psychiatric:         Mood and Affect: Mood normal.         Behavior: Behavior normal.          Thought Content: Thought content normal.         Judgment: Judgment normal.            Results Reviewed:  I have personally reviewed most recent indicated data and agree with findings including:  [x]  Laboratory  [x]  Radiology  [x]  EKG/Telemetry  []  Pathology  []  Cardiac/Vascular Studies  []  Old records  []  Other:  Most pertinent findings include:      LAB RESULTS:      Lab 11/12/21  1739   WBC 12.07*   HEMOGLOBIN 11.9*   HEMATOCRIT 33.6*   PLATELETS 256   NEUTROS ABS 10.05*   IMMATURE GRANS (ABS) 0.06*   LYMPHS ABS 0.74   MONOS ABS 1.19*   EOS ABS 0.01   MCV 83.8   PROTIME 12.5   APTT 35.9         Lab 11/12/21  1739   SODIUM 115*   POTASSIUM 4.4   CHLORIDE 79*   CO2 22.0   ANION GAP 14.0   BUN 16   CREATININE 1.83*   GLUCOSE 107*   CALCIUM 9.2         Lab 11/12/21  1739   TOTAL PROTEIN 6.6   ALBUMIN 4.00   GLOBULIN 2.6   ALT (SGPT) 16   AST (SGOT) 21   BILIRUBIN 0.4   ALK PHOS 113         Lab 11/12/21  1739   TROPONIN T <0.010   PROTIME 12.5   INR 0.96                 Brief Urine Lab Results  (Last result in the past 365 days)      Color   Clarity   Blood   Leuk Est   Nitrite   Protein   CREAT   Urine HCG        11/12/21 1821 Yellow   Clear   Negative   Moderate (2+)   Negative   Negative               Microbiology Results (last 10 days)     Procedure Component Value - Date/Time    COVID PRE-OP / PRE-PROCEDURE SCREENING ORDER (NO ISOLATION) - Swab, Nasopharynx [[id number]]  (Normal) Collected: 11/12/21 1731    Lab Status: Final result Specimen: Swab from Nasopharynx Updated: 11/12/21 1754    Narrative:      The following orders were created for panel order COVID PRE-OP / PRE-PROCEDURE SCREENING ORDER (NO ISOLATION) - Swab, Nasopharynx.  Procedure                               Abnormality         Status                     ---------                               -----------         ------                     COVID-19, ABBOTT IN-HOUS...[[id number]]  Normal              Final result                 Please view  results for these tests on the individual orders.    COVID-19, ABBOTT IN-HOUSE,NASAL Swab (NO TRANSPORT MEDIA) 2 HR TAT - Swab, Nasopharynx [[id number]]  (Normal) Collected: 11/12/21 1731    Lab Status: Final result Specimen: Swab from Nasopharynx Updated: 11/12/21 1754     COVID19 Presumptive Negative    Narrative:      Fact sheet for providers: https://www.fda.gov/media/238697/download     Fact sheet for patients: https://www.fda.gov/media/369522/download    Test performed by PCR.  If inconsistent with clinical signs and symptoms patient should be tested with different authorized molecular test.          CT Head Without Contrast    Result Date: 11/12/2021  EXAMINATION: CT HEAD WO CONTRAST - 11/12/2021  INDICATION: Fall head injury, trauma.  TECHNIQUE: Multiple axial CT imaging is obtained of the head from skull base to skull vertex without the administration of intravenous contrast.  The radiation dose reduction device was turned on for each scan per the ALARA (As Low as Reasonably Achievable) protocol.  COMPARISON: None  FINDINGS: There is a scalp hematoma overlying the right posterior parietal bone. There is a low-density area seen throughout the periventricular and subcortical white matter suggesting chronic small vessel ischemic change. No hemorrhage or hydrocephalus. No mass, mass effect, or midline shift. The bony structures reveal no evidence of osseous abnormality. The visualized paranasal sinuses are clear. The mastoid air cells are patent.      Impression: Scalp hematoma overlying the right posterior parietal bone. Chronic changes seen within the brain with no acute intracranial abnormality.  DICTATED:   11/12/2021 EDITED/lfs:   11/12/2021       CT Chest Without Contrast Diagnostic    Result Date: 11/12/2021  EXAMINATION: CT CHEST WO CONTRAST DIAGNOSTIC - 11/12/2021  INDICATION: Fall, left posterior chest wall pain.  TECHNIQUE: Multiple axial CT imaging is obtained of the chest without the administration  of intravenous contrast.  The radiation dose reduction device was turned on for each scan per the ALARA (As Low as Reasonably Achievable) protocol.  COMPARISON: None  FINDINGS: Some mild stranding identified in the subcutaneous tissues in the left posterior chest wall. Findings suggest a small hematoma. There are several nondisplaced rib fractures involving the left posterior sixth, seventh, eighth, and ninth ribs. There is a tiny effusion identified on the left. There are atelectatic changes in the left lung base. No pneumothorax. Degenerative changes seen within the spine.  Cardiac chambers within normal limits. There is no pericardial effusion. No bulky hilar or axillary lymphadenopathy. The upper abdomen is unremarkable.      Impression: Hematoma identified in the subcutaneous tissues along the left posterior chest wall with nondisplaced fractures involving the left posterior sixth, seventh, eighth, and ninth ribs with small left pleural effusion and atelectatic changes seen at the left lung base.  DICTATED:   11/12/2021 EDITED/lfs:   11/12/2021       CT Cervical Spine Without Contrast    Result Date: 11/12/2021  EXAMINATION: CT CERVICAL SPINE WO CONTRAST - 11/12/2021  INDICATION: Fall. Knot on the posterior head with neck pain.  TECHNIQUE: Multiple axial CT imaging is obtained of the cervical spine without the administration of intravenous contrast. Coronal and sagittal reformatted images were submitted to further facilitate diagnostic accuracy and treatment planning.  The radiation dose reduction device was turned on for each scan per the ALARA (As Low as Reasonably Achievable) protocol.  COMPARISON: None  FINDINGS: The lateral masses are well aligned. The odontoid tip is unremarkable. Minimal degenerative changes identified within the posterior facets. There is slight curvature identified of the cervical spine with convexity to the left. No fracture or dislocation. There is mild anterior spurring seen of the  C4/C5, C5/C6 and C6/C7 levels.      Impression: Multilevel degenerative changes identified throughout the cervical spine with no evidence of fracture or dislocation.  DICTATED:   11/12/2021 EDITED/lfs:   11/12/2021       CT Thoracic Spine Without Contrast    Result Date: 11/12/2021  EXAMINATION: CT THORACIC SPINE WO CONTRAST - 11/12/2021  INDICATION: Fall, mid back pain.  TECHNIQUE: Multiple axial CT imaging is obtained of the thoracic spine without the administration of intravenous contrast. Coronal and sagittal reformatted images were submitted to further facilitate diagnostic accuracy and treatment planning.  The radiation dose reduction device was turned on for each scan per the ALARA (As Low as Reasonably Achievable) protocol.  COMPARISON: None  FINDINGS: There is a tiny effusion identified on the left. The vertebral body height and disc spaces are preserved. The facets are well aligned. No irregularity identified to suggest evidence of fracture. Mild anterior spurring seen diffusely throughout the thoracic spine. There is mild anterior wedging identified and mild loss of height of the T12 vertebral body level however there is no definite evidence of acute fracture. Findings are likely chronic. There is a Schmorl's node seen along the superior endplate of T12. No other loss of height of the vertebral bodies.      Impression: Wedging involving T12. There is a Schmorl's node seen along the superior endplate. No evidence of acute bony abnormality. Findings are likely chronic with multilevel degenerative changes seen within the mid and lower thoracic vertebral body levels with mild anterior spurring. No fracture or dislocation.  DICTATED:   11/12/2021 EDITED/lfs:   11/12/2021       CT Lumbar Spine Without Contrast    Result Date: 11/12/2021  EXAMINATION: CT LUMBAR SPINE WO CONTRAST - 11/12/2021  INDICATION: Fall, low back pain.  TECHNIQUE: Multiple axial CT imaging is obtained of lumbar spine without the  administration of intravenous contrast. Coronal and sagittal reformatted images were submitted to further facilitate diagnostic accuracy and treatment planning.  The radiation dose reduction device was turned on for each scan per the ALARA (As Low as Reasonably Achievable) protocol.  COMPARISON: None  FINDINGS: The vertebral body height and disc spaces are preserved. Facets are well aligned. Pedicles are intact. There are mild degenerative changes seen in the SA joints bilaterally. There is no loss of height of the vertebral bodies with disc space narrowing seen at the L3/L4, L4/L5 and L5/S1 levels. There is no fracture or dislocation. Mild anterior spurring involving the L1, L2, and L3 levels. Facets are well aligned. Pedicles are intact.      Impression: Multilevel degenerative changes identified diffusely throughout the lumbar spine with no evidence of fracture or dislocation.  DICTATED:   11/12/2021 EDITED/lfs:   11/12/2021             Assessment/Plan   Assessment & Plan       CAD (coronary artery disease)    Hypertension    Hyperlipidemia    GERD (gastroesophageal reflux disease)    Anxiety    Acute hyponatremia    Fall    Rib fractures      71 year old female presents to the ED accompanied by son due to fall this am along with confusion.     1) Acute hyponatremia  -likely due to restarting losartan-HCTZ: hold  -monitor BP  -serial sodium levels   -check TSH, urine and serum osmo  -CT of head showed scalp hematoma overlying the right posterior parietal bone.  Chronic changes seen within the brain with no acute intracranial abnormality.   -goal: slowly increase over the 24 hrs, no more than 7mmol/24 hrs.   -s/p 1L normal saline in the ED  -continue gentle IVF for now     2) Fall       Rib fractures   -? Related to taking flexeril and xanax  -up with assistance  -fall precautions  -CT of lumbar spine, thoracic spine as mentioned above  -CT of chest with noted rib fractures     3) Elevated creatinine on CKD III    -likely secondary to recent bactrim   -baseline creatinine 1.3-1.7  -hold nephrotoxic agents  -monitor     4) Bacteriuria  -on bactrim: hold  -UA as noted above  -received one dose of rocephin in the ED  -will await urine cultures     5) generalized anxiety   -on xanax  -BILLY verified     6) Essential hypertension   -hold losartan HCTZ   -monitor BP     7) Hyperlipidemia  -continue statin     8) CAD  -continue DAPT       DVT prophylaxis:  scds    CODE STATUS:  Full code        This note has been completed as part of a split-shared workflow.     Signature: Electronically signed by PARISA Hamm, 11/12/21, 8:11 PM EST.      Attending   Admission Attestation       I have seen and examined the patient, performing an independent face-to-face diagnostic evaluation with plan of care reviewed and developed with the advanced practice clinician (APC).      Brief Summary Statement:   Nallely Mccabe is a 71 y.o. female with a past medical history of anxiety, CAD, depression, HLD, HTN that presented to the ED complaining of intermittent confusion per her son and a recent fall. The patient states that she fell 3 days ago at her PCP's office. She states she suffered rib fractures and has been taking Flexoril to help with the pain. She states she took this with her Xanax last night and today. She states that her son came to check on her today and found her on the floor. She was awake and alert but kept repeating her son's phone number over and over so he brought her to the ED for evaluation. The patient's sodium was found to be 115. She had a recent UTI and has been on Bactrim x 1 week. We were asked to admit.    Remainder of detailed HPI is as noted by APC and has been reviewed and/or edited by me for completeness.    Attending Physical Exam:  Constitutional: Awake, alert  Eyes: PERRLA, sclerae anicteric, no conjunctival injection  HENT: NCAT, mucous membranes moist  Neck: Supple, no thyromegaly, no lymphadenopathy,  trachea midline  Respiratory: Clear to auscultation bilaterally, nonlabored respirations   Cardiovascular: RRR, no murmurs, rubs, or gallops, palpable pedal pulses bilaterally  Gastrointestinal: Positive bowel sounds, soft, nontender, nondistended  Musculoskeletal: No bilateral ankle edema, no clubbing or cyanosis to extremities  Psychiatric: Appropriate affect, cooperative  Neurologic: Oriented x 3, strength symmetric in all extremities, Cranial Nerves grossly intact to confrontation, speech clear  Skin: No rashes    Brief Assessment/Plan :  See detailed assessment and plan developed with APC which I have reviewed and/or edited for completeness.        Admission Status: I believe that this patient meets INPATIENT status due to hyponatremia and fall.  I feel patient’s risk for adverse outcomes and need for care warrant INPATIENT evaluation and I predict the patient’s care encounter to likely last beyond 2 midnights.      Katherine Emerson DO  11/12/21

## 2021-11-13 NOTE — PROGRESS NOTES
[facility] Medicine Services  PROGRESS NOTE    Patient Name: Nallely Mccabe  : 1950  MRN: [id number]    Date of Admission: 2021  Primary Care Physician: Shay Rivera MD    Subjective   Subjective     CC:  AMS, hyponatremia, fall     HPI:  States she is ok. Think she is doing better. States she had labs done with PCP and her Na was low. They advised her to stop losartan-HCTZ. States her BP trended up so she restarted medication without telling her PCP or cardiologist. Has frequent falls     ROS:  Gen- No fevers, chills  CV- No chest pain, palpitations  Resp- No cough, dyspnea  GI- No N/V/D, abd pain     Objective   Objective     Vital Signs:   Temp:  [97.5 °F (36.4 °C)-98.7 °F (37.1 °C)] 97.5 °F (36.4 °C)  Heart Rate:  [79-95] 79  Resp:  [16-18] 18  BP: ()/(77-90) 106/77     Physical Exam:  Constitutional: No acute distress, awake, alert  HENT: NCAT, mucous membranes moist  Respiratory: Clear to auscultation bilaterally, respiratory effort normal   Cardiovascular: RRR, no murmurs, rubs, or gallops  Gastrointestinal: Positive bowel sounds, soft, nontender, nondistended  Musculoskeletal: No bilateral ankle edema  Psychiatric: Appropriate affect, cooperative  Neurologic: Oriented x 3 but still with some confusion, strength symmetric in all extremities, Cranial Nerves grossly intact to confrontation, speech clear  Skin: No rashes    Results Reviewed:  LAB RESULTS:      Lab 21  1739   WBC 8.62 12.07*   HEMOGLOBIN 11.6* 11.9*   HEMATOCRIT 32.8* 33.6*   PLATELETS 272 256   NEUTROS ABS 6.12 10.05*   IMMATURE GRANS (ABS) 0.05 0.06*   LYMPHS ABS 1.20 0.74   MONOS ABS 1.15* 1.19*   EOS ABS 0.06 0.01   MCV 82.4 83.8   LACTATE  --  1.6   PROTIME  --  12.5   APTT  --  35.9         Lab 21  0303 11/12/21  2044 11/12/21  1739   SODIUM 121* 123* 115*   POTASSIUM 3.9  --  4.4   CHLORIDE 86*  --  79*   CO2 22.0  --  22.0   ANION GAP 13.0  --  14.0   BUN 15   --  16   CREATININE 1.74*  --  1.83*   GLUCOSE 114*  --  107*   CALCIUM 9.0  --  9.2         Lab 11/12/21  1739   TOTAL PROTEIN 6.6   ALBUMIN 4.00   GLOBULIN 2.6   ALT (SGPT) 16   AST (SGOT) 21   BILIRUBIN 0.4   ALK PHOS 113         Lab 11/12/21 1739   TROPONIN T <0.010   PROTIME 12.5   INR 0.96                 Brief Urine Lab Results  (Last result in the past 365 days)      Color   Clarity   Blood   Leuk Est   Nitrite   Protein   CREAT   Urine HCG        11/12/21 1821 Yellow   Clear   Negative   Moderate (2+)   Negative   Negative                 Microbiology Results Abnormal     Procedure Component Value - Date/Time    COVID PRE-OP / PRE-PROCEDURE SCREENING ORDER (NO ISOLATION) - Swab, Nasopharynx [[id number]]  (Normal) Collected: 11/12/21 1731    Lab Status: Final result Specimen: Swab from Nasopharynx Updated: 11/12/21 1754    Narrative:      The following orders were created for panel order COVID PRE-OP / PRE-PROCEDURE SCREENING ORDER (NO ISOLATION) - Swab, Nasopharynx.  Procedure                               Abnormality         Status                     ---------                               -----------         ------                     COVID-19, ABBOTT IN-HOUS...[[id number]]  Normal              Final result                 Please view results for these tests on the individual orders.    COVID-19, ABBOTT IN-HOUSE,NASAL Swab (NO TRANSPORT MEDIA) 2 HR TAT - Swab, Nasopharynx [[id number]]  (Normal) Collected: 11/12/21 1731    Lab Status: Final result Specimen: Swab from Nasopharynx Updated: 11/12/21 1754     COVID19 Presumptive Negative    Narrative:      Fact sheet for providers: https://www.fda.gov/media/632766/download     Fact sheet for patients: https://www.fda.gov/media/164173/download    Test performed by PCR.  If inconsistent with clinical signs and symptoms patient should be tested with different authorized molecular test.          CT Head Without Contrast    Result Date: 11/12/2021  EXAMINATION: CT  HEAD WO CONTRAST - 11/12/2021  INDICATION: Fall head injury, trauma.  TECHNIQUE: Multiple axial CT imaging is obtained of the head from skull base to skull vertex without the administration of intravenous contrast.  The radiation dose reduction device was turned on for each scan per the ALARA (As Low as Reasonably Achievable) protocol.  COMPARISON: None  FINDINGS: There is a scalp hematoma overlying the right posterior parietal bone. There is a low-density area seen throughout the periventricular and subcortical white matter suggesting chronic small vessel ischemic change. No hemorrhage or hydrocephalus. No mass, mass effect, or midline shift. The bony structures reveal no evidence of osseous abnormality. The visualized paranasal sinuses are clear. The mastoid air cells are patent.      Impression: Scalp hematoma overlying the right posterior parietal bone. Chronic changes seen within the brain with no acute intracranial abnormality.  DICTATED:   11/12/2021 EDITED/lfs:   11/12/2021       CT Chest Without Contrast Diagnostic    Result Date: 11/12/2021  EXAMINATION: CT CHEST WO CONTRAST DIAGNOSTIC - 11/12/2021  INDICATION: Fall, left posterior chest wall pain.  TECHNIQUE: Multiple axial CT imaging is obtained of the chest without the administration of intravenous contrast.  The radiation dose reduction device was turned on for each scan per the ALARA (As Low as Reasonably Achievable) protocol.  COMPARISON: None  FINDINGS: Some mild stranding identified in the subcutaneous tissues in the left posterior chest wall. Findings suggest a small hematoma. There are several nondisplaced rib fractures involving the left posterior sixth, seventh, eighth, and ninth ribs. There is a tiny effusion identified on the left. There are atelectatic changes in the left lung base. No pneumothorax. Degenerative changes seen within the spine.  Cardiac chambers within normal limits. There is no pericardial effusion. No bulky hilar or axillary  lymphadenopathy. The upper abdomen is unremarkable.      Impression: Hematoma identified in the subcutaneous tissues along the left posterior chest wall with nondisplaced fractures involving the left posterior sixth, seventh, eighth, and ninth ribs with small left pleural effusion and atelectatic changes seen at the left lung base.  DICTATED:   11/12/2021 EDITED/lfs:   11/12/2021       CT Cervical Spine Without Contrast    Result Date: 11/12/2021  EXAMINATION: CT CERVICAL SPINE WO CONTRAST - 11/12/2021  INDICATION: Fall. Knot on the posterior head with neck pain.  TECHNIQUE: Multiple axial CT imaging is obtained of the cervical spine without the administration of intravenous contrast. Coronal and sagittal reformatted images were submitted to further facilitate diagnostic accuracy and treatment planning.  The radiation dose reduction device was turned on for each scan per the ALARA (As Low as Reasonably Achievable) protocol.  COMPARISON: None  FINDINGS: The lateral masses are well aligned. The odontoid tip is unremarkable. Minimal degenerative changes identified within the posterior facets. There is slight curvature identified of the cervical spine with convexity to the left. No fracture or dislocation. There is mild anterior spurring seen of the C4/C5, C5/C6 and C6/C7 levels.      Impression: Multilevel degenerative changes identified throughout the cervical spine with no evidence of fracture or dislocation.  DICTATED:   11/12/2021 EDITED/lfs:   11/12/2021       CT Thoracic Spine Without Contrast    Result Date: 11/12/2021  EXAMINATION: CT THORACIC SPINE WO CONTRAST - 11/12/2021  INDICATION: Fall, mid back pain.  TECHNIQUE: Multiple axial CT imaging is obtained of the thoracic spine without the administration of intravenous contrast. Coronal and sagittal reformatted images were submitted to further facilitate diagnostic accuracy and treatment planning.  The radiation dose reduction device was turned on for each scan  per the ALARA (As Low as Reasonably Achievable) protocol.  COMPARISON: None  FINDINGS: There is a tiny effusion identified on the left. The vertebral body height and disc spaces are preserved. The facets are well aligned. No irregularity identified to suggest evidence of fracture. Mild anterior spurring seen diffusely throughout the thoracic spine. There is mild anterior wedging identified and mild loss of height of the T12 vertebral body level however there is no definite evidence of acute fracture. Findings are likely chronic. There is a Schmorl's node seen along the superior endplate of T12. No other loss of height of the vertebral bodies.      Impression: Wedging involving T12. There is a Schmorl's node seen along the superior endplate. No evidence of acute bony abnormality. Findings are likely chronic with multilevel degenerative changes seen within the mid and lower thoracic vertebral body levels with mild anterior spurring. No fracture or dislocation.  DICTATED:   11/12/2021 EDITED/lfs:   11/12/2021       CT Lumbar Spine Without Contrast    Result Date: 11/12/2021  EXAMINATION: CT LUMBAR SPINE WO CONTRAST - 11/12/2021  INDICATION: Fall, low back pain.  TECHNIQUE: Multiple axial CT imaging is obtained of lumbar spine without the administration of intravenous contrast. Coronal and sagittal reformatted images were submitted to further facilitate diagnostic accuracy and treatment planning.  The radiation dose reduction device was turned on for each scan per the ALARA (As Low as Reasonably Achievable) protocol.  COMPARISON: None  FINDINGS: The vertebral body height and disc spaces are preserved. Facets are well aligned. Pedicles are intact. There are mild degenerative changes seen in the SA joints bilaterally. There is no loss of height of the vertebral bodies with disc space narrowing seen at the L3/L4, L4/L5 and L5/S1 levels. There is no fracture or dislocation. Mild anterior spurring involving the L1, L2, and  L3 levels. Facets are well aligned. Pedicles are intact.      Impression: Multilevel degenerative changes identified diffusely throughout the lumbar spine with no evidence of fracture or dislocation.  DICTATED:   11/12/2021 EDITED/lfs:   11/12/2021             I have reviewed the medications:  Scheduled Meds:aspirin EC, 325 mg, Oral, Daily  buPROPion XL, 300 mg, Oral, Daily  clopidogrel, 75 mg, Oral, Daily  fluticasone, 1 spray, Nasal, Daily  heparin (porcine), 5,000 Units, Subcutaneous, Q8H  pantoprazole, 40 mg, Oral, Daily  pravastatin, 20 mg, Oral, Nightly  sodium chloride, 10 mL, Intravenous, Q12H      Continuous Infusions:dextrose, 75 mL/hr, Last Rate: 75 mL/hr (11/13/21 0348)      PRN Meds:.•  acetaminophen **OR** acetaminophen **OR** acetaminophen  •  ALPRAZolam  •  benzonatate  •  [COMPLETED] Insert peripheral IV **AND** sodium chloride  •  sodium chloride    Assessment/Plan   Assessment & Plan     Active Hospital Problems    Diagnosis  POA   • **Acute hyponatremia [E87.1]  Yes   • Fall [W19.XXXA]  Yes   • Rib fractures [S22.49XA]  Yes   • Bacteriuria [R82.71]  Yes   • CAD (coronary artery disease) [I25.10]  Yes   • Hypertension [I10]  Yes   • Hyperlipidemia [E78.5]  Yes   • GERD (gastroesophageal reflux disease) [K21.9]  Yes   • Anxiety [F41.9]  Yes      Resolved Hospital Problems   No resolved problems to display.        Brief Hospital Course to date:  71 year old female presents to the ED accompanied by son due to fall this am along with confusion.      Acute hyponatremia  -likely due to restarting losartan-HCTZ: hold  - Initial Na 115; increased to 123 and D5 started; now down to 121 and 118  - Neph consulted -- fluid restrict; hold HCTZ; DC heparin and start SCDs  - Na ordered q4 hours -- nursing communicate ordered       Fall   Rib fractures   -CT of head showed scalp hematoma overlying the right posterior parietal bone.  Chronic changes seen within the brain with no acute intracranial  abnormality.  -up with assistance  -fall precautions  -CT of lumbar spine, thoracic spine as mentioned above  -CT of chest with noted rib fractures      Elevated creatinine on CKD III   -likely secondary to recent bactrim; diuretics   -baseline creatinine 1.3-1.7  -hold nephrotoxic agents  -monitor      Bacteriuria  -on bactrim: hold  - UCx pending   - Continue rocephin     generalized anxiety   -on xanax - continue home [facility]   -[facility] verified      Essential hypertension   -hold losartan HCTZ   - Currently BP controlled      Hyperlipidemia  -continue statin      CAD  -continue DAPT     DVT prophylaxis:  Medical DVT prophylaxis orders are present.            Disposition: I expect the patient to be discharged TBD    CODE STATUS:   Code Status and Medical Interventions:   Ordered at: 11/12/21 1959     Level Of Support Discussed With:    Patient     Code Status (Patient has no pulse and is not breathing):    CPR (Attempt to Resuscitate)     Medical Interventions (Patient has pulse or is breathing):    Full Support       Betty Frazier, DO  11/13/21

## 2021-11-13 NOTE — CONSULTS
Referring Provider: Dr. Rachelle Frazier  Reason for Consultation: Severe hyponatremia, acute kidney failure    Subjective     Chief complaint: S/p fall and confusion    History of present illness:   71-year-old female with a history of depression, hyperlipidemia, essential hypertension, CAD, anxiety, presented to the ED with confusion and fall, she had another fall a few days ago went to her PCP office found to have fractured ribs.  Patient has been taking Flexeril for the pain and also on Xanax at night.  Patient had a fall on the day of admission, she recently had been on Bactrim for UTI.  Patient has a history of hyponatremia in the past when she was asked to stop taking HCTZ.  Patient noticed that her blood pressure started to go up and she went back on losartan with HCTZ.  Previously she has been on Prozac which has been changed to Wellbutrin.  Admitting blood work showed sodium 115, chloride 79, creatinine 1.83, BUN 16 CO2 22 calcium 9.2 albumin 4.0 liver enzymes normal, serum osmolality 248, urine osmolality 200, urine sodium <20, since yesterday serum sodium improved to 121, now back to 118.  Her home medications included diclofenac, alprazolam, HCTZ, losartan, fluticasone nasal spray,  statin and other medication listed in the chart.  She has a history of CKD stage III she was told 3 years ago baseline creatinine unknown at this time.  Etiology of CKD is unknown.  Denies any epistaxis hemoptysis hematemesis gross hematuria kidney stone.  She does have a family history of renal failure, mother had atrophic left kidney and stent was done on the right kidney for renal artery stenosis  History  Past Medical History:   Diagnosis Date   • Anxiety    • CAD (coronary artery disease)    • Depression    • GERD (gastroesophageal reflux disease)    • Hyperlipidemia    • Hypertension    • Obesity (BMI 30.0-34.9)    • Pyelonephritis    • Pyelonephritis, acute 5/24/2018    Right sided   • Sepsis (HCC)    • Sepsis (HCC)  5/23/2018   • UTI (urinary tract infection)    ,   Past Surgical History:   Procedure Laterality Date   • CORONARY ANGIOPLASTY  10/18/2011   • EYE SURGERY     • ORIF HUMERUS FRACTURE     • SINUS SURGERY     ,   Family History   Problem Relation Age of Onset   • Diabetes Mother    • Kidney failure Mother    • Heart attack Father    • Arthritis Sister    • Blindness Sister    • Breast cancer Neg Hx    • Ovarian cancer Neg Hx    ,   Social History     Socioeconomic History   • Marital status:    Tobacco Use   • Smoking status: Never Smoker   • Smokeless tobacco: Never Used   Substance and Sexual Activity   • Alcohol use: No   • Drug use: Defer   • Sexual activity: Defer     E-cigarette/Vaping     E-cigarette/Vaping Substances     E-cigarette/Vaping Devices       ,   Medications Prior to Admission   Medication Sig Dispense Refill Last Dose   • acetaminophen (TYLENOL) 500 MG tablet Take 500 mg by mouth As Needed for mild pain (1-3).      • ALPRAZolam (XANAX) 1 MG tablet Take 1 mg by mouth 3 (Three) Times a Day As Needed.  0    • aspirin  MG tablet Take 325 mg by mouth Daily.      • benzonatate (TESSALON) 100 MG capsule Take 100 mg by mouth As Needed for Cough.      • buPROPion XL (WELLBUTRIN XL) 300 MG 24 hr tablet Take 300 mg by mouth Daily.  0    • carboxymethylcellulose (REFRESH TEARS) 0.5 % solution Administer 2 drops to both eyes 3 (Three) Times a Day As Needed for Dry Eyes.      • clopidogrel (PLAVIX) 75 MG tablet Take 1 tablet by mouth Daily. 90 tablet 3    • diclofenac (VOLTAREN) 1 % gel gel Apply 4 g topically As Needed.      • dicyclomine (BENTYL) 10 MG capsule Take 10 mg by mouth As Needed.  0    • estradiol (VAGIFEM) 10 MCG tablet vaginal tablet Insert 1 tablet into the vagina 2 (Two) Times a Week.      • fluticasone (FLONASE) 50 MCG/ACT nasal spray 1 spray into the nostril(s) as directed by provider Daily.  0    • lansoprazole (PREVACID) 30 MG capsule TAKE 1 CAPSULE BY MOUTH TWICE DAILY 20  MINUTES BEFORE MEALS      • losartan-hydrochlorothiazide (Hyzaar) 50-12.5 MG per tablet Take 1 tablet by mouth Daily. 90 tablet 3    • meclizine (ANTIVERT) 25 MG tablet Take 25 mg by mouth As Needed.  0    • MEGARED OMEGA-3 KRILL  MG capsule Take 1,000 mg by mouth Daily.      • Multiple Vitamins-Minerals (PRESERVISION AREDS 2 PO) Take 1 tablet by mouth 2 (Two) Times a Day.      • ondansetron (ZOFRAN) 4 MG tablet take 1 tablet by mouth four times a day if needed for nausea  0    • pravastatin (PRAVACHOL) 20 MG tablet Take 1 tablet by mouth every night at bedtime. 90 tablet 3    • Wheat Dextrin (BENEFIBER PO) Take  by mouth.      , Scheduled Meds:  aspirin EC, 325 mg, Oral, Daily  buPROPion XL, 300 mg, Oral, Daily  cefTRIAXone, 1 g, Intravenous, Q24H  clopidogrel, 75 mg, Oral, Daily  fluticasone, 1 spray, Nasal, Daily  heparin (porcine), 5,000 Units, Subcutaneous, Q8H  pantoprazole, 40 mg, Oral, Daily  pravastatin, 20 mg, Oral, Nightly  sodium chloride, 10 mL, Intravenous, Q12H    , Continuous Infusions:   , PRN Meds:  •  acetaminophen **OR** acetaminophen **OR** acetaminophen  •  ALPRAZolam  •  benzonatate  •  [COMPLETED] Insert peripheral IV **AND** sodium chloride  •  sodium chloride and Allergies:  Patient has no known allergies.    Review of Systems  Pertinent items are noted in HPI, all other systems reviewed and negative    Objective     Vital Signs  Temp:  [97.5 °F (36.4 °C)-98.7 °F (37.1 °C)] 97.5 °F (36.4 °C)  Heart Rate:  [] 105  Resp:  [16-18] 18  BP: ()/(77-90) 106/77    No intake/output data recorded.  I/O last 3 completed shifts:  In: 1000 [I.V.:1000]  Out: -     Physical Exam:  General Appearance: Alert, oriented, no obvious distress.  HEENT: Atraumatic normocephalic head, eyes pupils reactive extraocular muscle intact, nose no bleed, oropharynx are clear, neck is supple, no JVD, no lymph node enlargement t, trachea is midline, carotid bruit not heard.  Lungs: Clear auscultation, no  rales rhonchi's, equal chest movement, nonlabored.  Heart: No gallop, murmur, rub, RRR.  Abdomen: Soft, nontender, positive bowel sounds, no organomegaly.  Extremities: No edema, no cyanosis.  Neuro: No focal deficit, moving all extremities, alert oriented X 3  Psych: Mood and affect are normal.  : No suprapubic fullness or tenderness.  Skin: Warm and dry.  No rashes noted.    Results Review:   I reviewed the patient's new clinical results.  I reviewed the patient's new imaging results and agree with the interpretation.    WBC WBC   Date Value Ref Range Status   11/13/2021 8.62 3.40 - 10.80 10*3/mm3 Final   11/12/2021 12.07 (H) 3.40 - 10.80 10*3/mm3 Final      HGB Hemoglobin   Date Value Ref Range Status   11/13/2021 11.6 (L) 12.0 - 15.9 g/dL Final   11/12/2021 11.9 (L) 12.0 - 15.9 g/dL Final      HCT Hematocrit   Date Value Ref Range Status   11/13/2021 32.8 (L) 34.0 - 46.6 % Final   11/12/2021 33.6 (L) 34.0 - 46.6 % Final      Platlets No results found for: LABPLAT   MCV MCV   Date Value Ref Range Status   11/13/2021 82.4 79.0 - 97.0 fL Final   11/12/2021 83.8 79.0 - 97.0 fL Final          Sodium Sodium   Date Value Ref Range Status   11/13/2021 118 (C) 136 - 145 mmol/L Final   11/13/2021 121 (L) 136 - 145 mmol/L Final   11/12/2021 123 (L) 136 - 145 mmol/L Final   11/12/2021 115 (C) 136 - 145 mmol/L Final      Potassium Potassium   Date Value Ref Range Status   11/13/2021 3.9 3.5 - 5.2 mmol/L Final   11/12/2021 4.4 3.5 - 5.2 mmol/L Final      Chloride Chloride   Date Value Ref Range Status   11/13/2021 86 (L) 98 - 107 mmol/L Final   11/12/2021 79 (L) 98 - 107 mmol/L Final      CO2 CO2   Date Value Ref Range Status   11/13/2021 22.0 22.0 - 29.0 mmol/L Final   11/12/2021 22.0 22.0 - 29.0 mmol/L Final      BUN BUN   Date Value Ref Range Status   11/13/2021 15 8 - 23 mg/dL Final   11/12/2021 16 8 - 23 mg/dL Final      Creatinine Creatinine   Date Value Ref Range Status   11/13/2021 1.74 (H) 0.57 - 1.00 mg/dL Final    11/12/2021 1.83 (H) 0.57 - 1.00 mg/dL Final      Calcium Calcium   Date Value Ref Range Status   11/13/2021 9.0 8.6 - 10.5 mg/dL Final   11/12/2021 9.2 8.6 - 10.5 mg/dL Final      PO4 No results found for: CAPO4   Albumin Albumin   Date Value Ref Range Status   11/12/2021 4.00 3.50 - 5.20 g/dL Final      Magnesium No results found for: MG   Uric Acid No results found for: URICACID         aspirin EC, 325 mg, Oral, Daily  buPROPion XL, 300 mg, Oral, Daily  cefTRIAXone, 1 g, Intravenous, Q24H  clopidogrel, 75 mg, Oral, Daily  fluticasone, 1 spray, Nasal, Daily  heparin (porcine), 5,000 Units, Subcutaneous, Q8H  pantoprazole, 40 mg, Oral, Daily  pravastatin, 20 mg, Oral, Nightly  sodium chloride, 10 mL, Intravenous, Q12H           Assessment/Plan       Acute hyponatremia    CAD (coronary artery disease)    Hypertension    Hyperlipidemia    GERD (gastroesophageal reflux disease)    Anxiety    Fall    Rib fractures    Bacteriuria  1.  Severe hyponatremia: Asymptomatic at this time.  Patient sodium 118, patient was on hydrochlorothiazide at home.  She has a history of hyponatremia in the past with hydrochlorothiazide which was stopped previously by the primary care physician, patient went on her own to restart it as her blood pressure was running high.  Urine sodium less than 20, urine osmolality 200.  She also had significant pain from fracture of rib, pain and nausea can increase ADH secretion, also heparin and action on aldosterone secretion as well as tubular action.  2.  CKD stage III: According to the patient patient has this problem for last 3 years.  3.  History of hypertension.  4.  Anemia.  Recommendations:  Water restriction less than 1200 mL/day.  Hold HCTZ.  If possible change heparin.  Checks serial labs.  Avoid nephrotoxic medications.  Check iron studies.  High risk patient with multiple medical problem.  Case discussed with . Rachelle Frazier, son in the room and the patient.  Okay to give IV saline for  now.    I discussed the patients findings and my recommendations with patient and nursing staff    Saniya Landa MD  11/13/21  @[facility]

## 2021-11-14 LAB
ALBUMIN SERPL-MCNC: 3.3 G/DL (ref 3.5–5.2)
ANION GAP SERPL CALCULATED.3IONS-SCNC: 13 MMOL/L (ref 5–15)
BACTERIA SPEC AEROBE CULT: NORMAL
BUN SERPL-MCNC: 12 MG/DL (ref 8–23)
BUN/CREAT SERPL: 8.5 (ref 7–25)
CALCIUM SPEC-SCNC: 8.8 MG/DL (ref 8.6–10.5)
CHLORIDE SERPL-SCNC: 88 MMOL/L (ref 98–107)
CO2 SERPL-SCNC: 20 MMOL/L (ref 22–29)
CREAT SERPL-MCNC: 1.41 MG/DL (ref 0.57–1)
DEPRECATED RDW RBC AUTO: 48.2 FL (ref 37–54)
ERYTHROCYTE [DISTWIDTH] IN BLOOD BY AUTOMATED COUNT: 14.7 % (ref 12.3–15.4)
GFR SERPL CREATININE-BSD FRML MDRD: 37 ML/MIN/1.73
GLUCOSE SERPL-MCNC: 103 MG/DL (ref 65–99)
HCT VFR BLD AUTO: 33.7 % (ref 34–46.6)
HGB BLD-MCNC: 11.1 G/DL (ref 12–15.9)
IRON 24H UR-MRATE: 83 MCG/DL (ref 37–145)
IRON SATN MFR SERPL: 34 % (ref 20–50)
MCH RBC QN AUTO: 29.3 PG (ref 26.6–33)
MCHC RBC AUTO-ENTMCNC: 32.9 G/DL (ref 31.5–35.7)
MCV RBC AUTO: 88.9 FL (ref 79–97)
PHOSPHATE SERPL-MCNC: 3.6 MG/DL (ref 2.5–4.5)
PLATELET # BLD AUTO: 261 10*3/MM3 (ref 140–450)
PMV BLD AUTO: 9.1 FL (ref 6–12)
POTASSIUM SERPL-SCNC: 3.9 MMOL/L (ref 3.5–5.2)
QT INTERVAL: 344 MS
QTC INTERVAL: 436 MS
RBC # BLD AUTO: 3.79 10*6/MM3 (ref 3.77–5.28)
SODIUM SERPL-SCNC: 121 MMOL/L (ref 136–145)
SODIUM SERPL-SCNC: 123 MMOL/L (ref 136–145)
SODIUM SERPL-SCNC: 125 MMOL/L (ref 136–145)
TIBC SERPL-MCNC: 241 MCG/DL (ref 298–536)
TRANSFERRIN SERPL-MCNC: 162 MG/DL (ref 200–360)
WBC # BLD AUTO: 5.79 10*3/MM3 (ref 3.4–10.8)

## 2021-11-14 PROCEDURE — 84295 ASSAY OF SERUM SODIUM: CPT | Performed by: NURSE PRACTITIONER

## 2021-11-14 PROCEDURE — 80069 RENAL FUNCTION PANEL: CPT | Performed by: INTERNAL MEDICINE

## 2021-11-14 PROCEDURE — 83540 ASSAY OF IRON: CPT | Performed by: INTERNAL MEDICINE

## 2021-11-14 PROCEDURE — 99232 SBSQ HOSP IP/OBS MODERATE 35: CPT | Performed by: INTERNAL MEDICINE

## 2021-11-14 PROCEDURE — 84466 ASSAY OF TRANSFERRIN: CPT | Performed by: INTERNAL MEDICINE

## 2021-11-14 PROCEDURE — 25010000002 SODIUM CHLORIDE 0.9 % WITH KCL 20 MEQ 20-0.9 MEQ/L-% SOLUTION: Performed by: INTERNAL MEDICINE

## 2021-11-14 PROCEDURE — 25010000002 CEFTRIAXONE PER 250 MG: Performed by: INTERNAL MEDICINE

## 2021-11-14 PROCEDURE — 85027 COMPLETE CBC AUTOMATED: CPT | Performed by: INTERNAL MEDICINE

## 2021-11-14 RX ORDER — SODIUM CHLORIDE, SODIUM LACTATE, POTASSIUM CHLORIDE, CALCIUM CHLORIDE 600; 310; 30; 20 MG/100ML; MG/100ML; MG/100ML; MG/100ML
75 INJECTION, SOLUTION INTRAVENOUS CONTINUOUS
Status: ACTIVE | OUTPATIENT
Start: 2021-11-14 | End: 2021-11-14

## 2021-11-14 RX ORDER — SODIUM CHLORIDE AND POTASSIUM CHLORIDE 150; 900 MG/100ML; MG/100ML
75 INJECTION, SOLUTION INTRAVENOUS CONTINUOUS
Status: ACTIVE | OUTPATIENT
Start: 2021-11-14 | End: 2021-11-16

## 2021-11-14 RX ADMIN — ALPRAZOLAM 1 MG: 1 TABLET ORAL at 20:33

## 2021-11-14 RX ADMIN — SODIUM CHLORIDE, PRESERVATIVE FREE 10 ML: 5 INJECTION INTRAVENOUS at 08:49

## 2021-11-14 RX ADMIN — ACETAMINOPHEN 650 MG: 325 TABLET, FILM COATED ORAL at 20:33

## 2021-11-14 RX ADMIN — ASPIRIN 325 MG: 325 TABLET, COATED ORAL at 08:48

## 2021-11-14 RX ADMIN — BENZONATATE 100 MG: 100 CAPSULE ORAL at 20:33

## 2021-11-14 RX ADMIN — ACETAMINOPHEN 650 MG: 325 TABLET, FILM COATED ORAL at 08:52

## 2021-11-14 RX ADMIN — PANTOPRAZOLE SODIUM 40 MG: 40 TABLET, DELAYED RELEASE ORAL at 08:48

## 2021-11-14 RX ADMIN — SODIUM CHLORIDE 1 G: 900 INJECTION INTRAVENOUS at 20:33

## 2021-11-14 RX ADMIN — CLOPIDOGREL BISULFATE 75 MG: 75 TABLET ORAL at 08:48

## 2021-11-14 RX ADMIN — ALPRAZOLAM 1 MG: 1 TABLET ORAL at 08:52

## 2021-11-14 RX ADMIN — SODIUM CHLORIDE, POTASSIUM CHLORIDE, SODIUM LACTATE AND CALCIUM CHLORIDE 50 ML/HR: 600; 310; 30; 20 INJECTION, SOLUTION INTRAVENOUS at 01:57

## 2021-11-14 RX ADMIN — PRAVASTATIN SODIUM 20 MG: 20 TABLET ORAL at 20:33

## 2021-11-14 RX ADMIN — SODIUM CHLORIDE, PRESERVATIVE FREE 10 ML: 5 INJECTION INTRAVENOUS at 20:33

## 2021-11-14 RX ADMIN — BUPROPION HYDROCHLORIDE 300 MG: 150 TABLET, FILM COATED, EXTENDED RELEASE ORAL at 08:48

## 2021-11-14 RX ADMIN — FLUTICASONE PROPIONATE 1 SPRAY: 50 SPRAY, METERED NASAL at 08:48

## 2021-11-14 RX ADMIN — POTASSIUM CHLORIDE AND SODIUM CHLORIDE 100 ML/HR: 900; 150 INJECTION, SOLUTION INTRAVENOUS at 14:01

## 2021-11-14 NOTE — PROGRESS NOTES
"   LOS: 2 days    Patient Care Team:  Shay Rivera MD as PCP - General  Shay Rivera MD as PCP - Family Medicine    Chief Complaint: Fall, hyponatremia  71-year-old history of depression, hyperlipidemia, essential hypertension, CAD, anxiety presented to ED with confusion and fall, has been on HCTZ, serum sodium 115, history of chronic kidney disease for the last 3 years, admitting creatinine 1.83 BUN 16.  Liver enzymes were normal.  Subjective     Interval History:   Sodium 121 still low,    Review of Systems:   Complains of oral mucosa dryness.  Patient denies shortness of breath, chest pain, dysuria, hematuria, nausea, vomiting, denies diarrhea, headache or blurring of vision..  All other systems negative    Objective     Vital Sign Min/Max for last 24 hours  Temp  Min: 97.5 °F (36.4 °C)  Max: 98.1 °F (36.7 °C)   BP  Min: 104/90  Max: 114/71   Pulse  Min: 79  Max: 108   Resp  Min: 18  Max: 20   SpO2  Min: 92 %  Max: 100 %   No data recorded   No data recorded     Flowsheet Rows      First Filed Value   Admission Height 165.1 cm (65\") Documented at 11/12/2021 1458   Admission Weight 88 kg (194 lb) Documented at 11/12/2021 1458          No intake/output data recorded.  I/O last 3 completed shifts:  In: 1240 [P.O.:240; I.V.:1000]  Out: 950 [Urine:950]    Physical Exam:  General Appearance: Alert, oriented, no obvious distress.  Eyes: PER, EOMI.  Oral mucosa dry.  Neck: Supple no JVD.  Lungs: Clear auscultation, no rales rhonchi's, equal chest movement, nonlabored.  Heart: No gallop, murmur, rub, RRR.  Abdomen: Soft, nontender, positive bowel sounds, no organomegaly.  Extremities: No edema, no cyanosis.  Neuro: No focal deficit, moving all extremities, alert oriented X 3  Skin no rash noted skin warm and dry   no suprapubic fullness or tenderness.  Psych mood and affect are normal appropriate.  WBC WBC   Date Value Ref Range Status   11/13/2021 8.62 3.40 - 10.80 10*3/mm3 Final   11/12/2021 12.07 (H) " 3.40 - 10.80 10*3/mm3 Final      HGB Hemoglobin   Date Value Ref Range Status   11/13/2021 11.6 (L) 12.0 - 15.9 g/dL Final   11/12/2021 11.9 (L) 12.0 - 15.9 g/dL Final      HCT Hematocrit   Date Value Ref Range Status   11/13/2021 32.8 (L) 34.0 - 46.6 % Final   11/12/2021 33.6 (L) 34.0 - 46.6 % Final      Platlets No results found for: LABPLAT   MCV MCV   Date Value Ref Range Status   11/13/2021 82.4 79.0 - 97.0 fL Final   11/12/2021 83.8 79.0 - 97.0 fL Final          Sodium Sodium   Date Value Ref Range Status   11/14/2021 121 (L) 136 - 145 mmol/L Final   11/14/2021 121 (L) 136 - 145 mmol/L Final   11/13/2021 121 (L) 136 - 145 mmol/L Final   11/13/2021 120 (L) 136 - 145 mmol/L Final   11/13/2021 121 (L) 136 - 145 mmol/L Final   11/13/2021 118 (C) 136 - 145 mmol/L Final   11/13/2021 121 (L) 136 - 145 mmol/L Final   11/12/2021 123 (L) 136 - 145 mmol/L Final   11/12/2021 115 (C) 136 - 145 mmol/L Final      Potassium Potassium   Date Value Ref Range Status   11/14/2021 3.9 3.5 - 5.2 mmol/L Final     Comment:     Slight hemolysis detected by analyzer. Results may be affected.   11/13/2021 3.9 3.5 - 5.2 mmol/L Final   11/12/2021 4.4 3.5 - 5.2 mmol/L Final      Chloride Chloride   Date Value Ref Range Status   11/14/2021 88 (L) 98 - 107 mmol/L Final   11/13/2021 86 (L) 98 - 107 mmol/L Final   11/12/2021 79 (L) 98 - 107 mmol/L Final      CO2 CO2   Date Value Ref Range Status   11/14/2021 20.0 (L) 22.0 - 29.0 mmol/L Final   11/13/2021 22.0 22.0 - 29.0 mmol/L Final   11/12/2021 22.0 22.0 - 29.0 mmol/L Final      BUN BUN   Date Value Ref Range Status   11/14/2021 12 8 - 23 mg/dL Final   11/13/2021 15 8 - 23 mg/dL Final   11/12/2021 16 8 - 23 mg/dL Final      Creatinine Creatinine   Date Value Ref Range Status   11/14/2021 1.41 (H) 0.57 - 1.00 mg/dL Final   11/13/2021 1.74 (H) 0.57 - 1.00 mg/dL Final   11/12/2021 1.83 (H) 0.57 - 1.00 mg/dL Final      Calcium Calcium   Date Value Ref Range Status   11/14/2021 8.8 8.6 - 10.5  mg/dL Final   11/13/2021 9.0 8.6 - 10.5 mg/dL Final   11/12/2021 9.2 8.6 - 10.5 mg/dL Final      PO4 No results found for: CAPO4   Albumin Albumin   Date Value Ref Range Status   11/14/2021 3.30 (L) 3.50 - 5.20 g/dL Final   11/12/2021 4.00 3.50 - 5.20 g/dL Final      Magnesium No results found for: MG   Uric Acid No results found for: URICACID        Results Review:     I reviewed the patient's new clinical results.    aspirin EC, 325 mg, Oral, Daily  buPROPion XL, 300 mg, Oral, Daily  cefTRIAXone, 1 g, Intravenous, Q24H  clopidogrel, 75 mg, Oral, Daily  fluticasone, 1 spray, Nasal, Daily  pantoprazole, 40 mg, Oral, Daily  pravastatin, 20 mg, Oral, Nightly  sodium chloride, 10 mL, Intravenous, Q12H           Medication Review: Reviewed    Assessment/Plan       Acute hyponatremia    CAD (coronary artery disease)    Hypertension    Hyperlipidemia    GERD (gastroesophageal reflux disease)    Anxiety    Fall    Rib fractures    Bacteriuria     1.  Severe hyponatremia: Asymptomatic at this time.  Patient sodium 118, patient was on hydrochlorothiazide at home.  She has a history of hyponatremia in the past with hydrochlorothiazide which was stopped previously by the primary care physician, patient went on her own to restart it as her blood pressure was running high.  Urine sodium less than 20, urine osmolality 200.  She also had significant pain from fracture of rib, pain and nausea can increase ADH secretion, also heparin and action on aldosterone secretion as well as tubular action.  2.  CKD stage III: According to the patient patient has this problem for last 3 years.  3.  History of hypertension.  4.  Anemia.    Recommendations:  Examination shows some volume depletion with her oral mucosa dry, no edema.  Last urine sodium less than 20, will give IV fluids monitor sodium closely.  Water restriction less than 1200 mL/day.  Hold HCTZ.  Renal function has improved from yesterday.  Checks serial labs.  Avoid nephrotoxic  medications.  Check iron studies.  High risk patient with multiple medical problem.  Case discussed with Dr. Rachelle Frazier.  Case discussed with the son in the room.  Okay to give IV saline for now.  Check urine labs again in the morning.  Saniya Landa MD  11/14/21  12:08 EST

## 2021-11-14 NOTE — PLAN OF CARE
Goal Outcome Evaluation:  Plan of Care Reviewed With: patient        Progress: no change  Outcome Summary: VSS on RA, Q4 hour serum sodium. NS + 20k @ 100ml/hr

## 2021-11-14 NOTE — PROGRESS NOTES
[facility] Medicine Services  PROGRESS NOTE    Patient Name: Nallely Mccabe  : 1950  MRN: [id number]    Date of Admission: 2021  Primary Care Physician: Shay Rivera MD    Subjective   Subjective     CC:   hyponatremia     HPI:  No acute events. Reviewed her current labs. States she thinks her sodium was low and this is why she fell.     ROS:  Gen- No fevers, chills  CV- No chest pain, palpitations  Resp- No cough, dyspnea  GI- No N/V/D, abd pain     Objective   Objective     Vital Signs:   Temp:  [97.5 °F (36.4 °C)-98.1 °F (36.7 °C)] 97.5 °F (36.4 °C)  Heart Rate:  [] 88  Resp:  [18-20] 18  BP: (104-114)/(71-90) 105/85     Physical Exam:  Constitutional: No acute distress, awake, alert  HENT: NCAT, mucous membranes moist  Respiratory: Clear to auscultation bilaterally, respiratory effort normal   Cardiovascular: RRR, no murmurs, rubs, or gallops  Gastrointestinal: Positive bowel sounds, soft, nontender, nondistended  Musculoskeletal: No bilateral ankle edema  Psychiatric: Appropriate affect, cooperative  Neurologic: Oriented x 3 but seems to have some confusion, strength symmetric in all extremities, Cranial Nerves grossly intact to confrontation, speech clear  Skin: No rashes    Results Reviewed:  LAB RESULTS:      Lab 21  1739   WBC 8.62 12.07*   HEMOGLOBIN 11.6* 11.9*   HEMATOCRIT 32.8* 33.6*   PLATELETS 272 256   NEUTROS ABS 6.12 10.05*   IMMATURE GRANS (ABS) 0.05 0.06*   LYMPHS ABS 1.20 0.74   MONOS ABS 1.15* 1.19*   EOS ABS 0.06 0.01   MCV 82.4 83.8   LACTATE  --  1.6   PROTIME  --  12.5   APTT  --  35.9         Lab 21  0017 21  1922 21  1650 21  1354 21  0702 21  0303 21  0303 21  2044 21  1739   SODIUM 121* 121* 120* 121* 118*   < > 121*   < > 115*   POTASSIUM  --   --   --   --   --   --  3.9  --  4.4   CHLORIDE  --   --   --   --   --   --  86*  --  79*   CO2  --   --   --   --    --   --  22.0  --  22.0   ANION GAP  --   --   --   --   --   --  13.0  --  14.0   BUN  --   --   --   --   --   --  15  --  16   CREATININE  --   --   --   --   --   --  1.74*  --  1.83*   GLUCOSE  --   --   --   --   --   --  114*  --  107*   CALCIUM  --   --   --   --   --   --  9.0  --  9.2    < > = values in this interval not displayed.         Lab 11/12/21 1739   TOTAL PROTEIN 6.6   ALBUMIN 4.00   GLOBULIN 2.6   ALT (SGPT) 16   AST (SGOT) 21   BILIRUBIN 0.4   ALK PHOS 113         Lab 11/12/21 1739   TROPONIN T <0.010   PROTIME 12.5   INR 0.96                 Brief Urine Lab Results  (Last result in the past 365 days)      Color   Clarity   Blood   Leuk Est   Nitrite   Protein   CREAT   Urine HCG        11/12/21 1821 Yellow   Clear   Negative   Moderate (2+)   Negative   Negative                 Microbiology Results Abnormal     Procedure Component Value - Date/Time    Blood Culture - Blood, Hand, Right [[id number]]  (Normal) Collected: 11/12/21 2030    Lab Status: Preliminary result Specimen: Blood from Hand, Right Updated: 11/13/21 2116     Blood Culture No growth at 24 hours    Blood Culture - Blood, Arm, Left [[id number]]  (Normal) Collected: 11/12/21 2044    Lab Status: Preliminary result Specimen: Blood from Arm, Left Updated: 11/13/21 2116     Blood Culture No growth at 24 hours    Urine Culture - Urine, Urine, Catheter [[id number]]  (Normal) Collected: 11/12/21 1821    Lab Status: Preliminary result Specimen: Urine, Catheter Updated: 11/13/21 0925     Urine Culture Culture in progress    COVID PRE-OP / PRE-PROCEDURE SCREENING ORDER (NO ISOLATION) - Swab, Nasopharynx [[id number]]  (Normal) Collected: 11/12/21 1731    Lab Status: Final result Specimen: Swab from Nasopharynx Updated: 11/12/21 1754    Narrative:      The following orders were created for panel order COVID PRE-OP / PRE-PROCEDURE SCREENING ORDER (NO ISOLATION) - Swab, Nasopharynx.  Procedure                               Abnormality          Status                     ---------                               -----------         ------                     COVID-19, ABBOTT IN-HOUS...[[id number]]  Normal              Final result                 Please view results for these tests on the individual orders.    COVID-19, ABBOTT IN-HOUSE,NASAL Swab (NO TRANSPORT MEDIA) 2 HR TAT - Swab, Nasopharynx [[id number]]  (Normal) Collected: 11/12/21 1731    Lab Status: Final result Specimen: Swab from Nasopharynx Updated: 11/12/21 1754     COVID19 Presumptive Negative    Narrative:      Fact sheet for providers: https://www.fda.gov/media/036975/download     Fact sheet for patients: https://www.fda.gov/media/897803/download    Test performed by PCR.  If inconsistent with clinical signs and symptoms patient should be tested with different authorized molecular test.          CT Head Without Contrast    Result Date: 11/12/2021  EXAMINATION: CT HEAD WO CONTRAST - 11/12/2021  INDICATION: Fall head injury, trauma.  TECHNIQUE: Multiple axial CT imaging is obtained of the head from skull base to skull vertex without the administration of intravenous contrast.  The radiation dose reduction device was turned on for each scan per the ALARA (As Low as Reasonably Achievable) protocol.  COMPARISON: None  FINDINGS: There is a scalp hematoma overlying the right posterior parietal bone. There is a low-density area seen throughout the periventricular and subcortical white matter suggesting chronic small vessel ischemic change. No hemorrhage or hydrocephalus. No mass, mass effect, or midline shift. The bony structures reveal no evidence of osseous abnormality. The visualized paranasal sinuses are clear. The mastoid air cells are patent.      Impression: Scalp hematoma overlying the right posterior parietal bone. Chronic changes seen within the brain with no acute intracranial abnormality.  DICTATED:   11/12/2021 EDITED/lfs:   11/12/2021       CT Chest Without Contrast Diagnostic    Result  Date: 11/12/2021  EXAMINATION: CT CHEST WO CONTRAST DIAGNOSTIC - 11/12/2021  INDICATION: Fall, left posterior chest wall pain.  TECHNIQUE: Multiple axial CT imaging is obtained of the chest without the administration of intravenous contrast.  The radiation dose reduction device was turned on for each scan per the ALARA (As Low as Reasonably Achievable) protocol.  COMPARISON: None  FINDINGS: Some mild stranding identified in the subcutaneous tissues in the left posterior chest wall. Findings suggest a small hematoma. There are several nondisplaced rib fractures involving the left posterior sixth, seventh, eighth, and ninth ribs. There is a tiny effusion identified on the left. There are atelectatic changes in the left lung base. No pneumothorax. Degenerative changes seen within the spine.  Cardiac chambers within normal limits. There is no pericardial effusion. No bulky hilar or axillary lymphadenopathy. The upper abdomen is unremarkable.      Impression: Hematoma identified in the subcutaneous tissues along the left posterior chest wall with nondisplaced fractures involving the left posterior sixth, seventh, eighth, and ninth ribs with small left pleural effusion and atelectatic changes seen at the left lung base.  DICTATED:   11/12/2021 EDITED/lfs:   11/12/2021       CT Cervical Spine Without Contrast    Result Date: 11/12/2021  EXAMINATION: CT CERVICAL SPINE WO CONTRAST - 11/12/2021  INDICATION: Fall. Knot on the posterior head with neck pain.  TECHNIQUE: Multiple axial CT imaging is obtained of the cervical spine without the administration of intravenous contrast. Coronal and sagittal reformatted images were submitted to further facilitate diagnostic accuracy and treatment planning.  The radiation dose reduction device was turned on for each scan per the ALARA (As Low as Reasonably Achievable) protocol.  COMPARISON: None  FINDINGS: The lateral masses are well aligned. The odontoid tip is unremarkable. Minimal  degenerative changes identified within the posterior facets. There is slight curvature identified of the cervical spine with convexity to the left. No fracture or dislocation. There is mild anterior spurring seen of the C4/C5, C5/C6 and C6/C7 levels.      Impression: Multilevel degenerative changes identified throughout the cervical spine with no evidence of fracture or dislocation.  DICTATED:   11/12/2021 EDITED/lfs:   11/12/2021       CT Thoracic Spine Without Contrast    Result Date: 11/12/2021  EXAMINATION: CT THORACIC SPINE WO CONTRAST - 11/12/2021  INDICATION: Fall, mid back pain.  TECHNIQUE: Multiple axial CT imaging is obtained of the thoracic spine without the administration of intravenous contrast. Coronal and sagittal reformatted images were submitted to further facilitate diagnostic accuracy and treatment planning.  The radiation dose reduction device was turned on for each scan per the ALARA (As Low as Reasonably Achievable) protocol.  COMPARISON: None  FINDINGS: There is a tiny effusion identified on the left. The vertebral body height and disc spaces are preserved. The facets are well aligned. No irregularity identified to suggest evidence of fracture. Mild anterior spurring seen diffusely throughout the thoracic spine. There is mild anterior wedging identified and mild loss of height of the T12 vertebral body level however there is no definite evidence of acute fracture. Findings are likely chronic. There is a Schmorl's node seen along the superior endplate of T12. No other loss of height of the vertebral bodies.      Impression: Wedging involving T12. There is a Schmorl's node seen along the superior endplate. No evidence of acute bony abnormality. Findings are likely chronic with multilevel degenerative changes seen within the mid and lower thoracic vertebral body levels with mild anterior spurring. No fracture or dislocation.  DICTATED:   11/12/2021 EDITED/lfs:   11/12/2021       CT Lumbar Spine  Without Contrast    Result Date: 11/12/2021  EXAMINATION: CT LUMBAR SPINE WO CONTRAST - 11/12/2021  INDICATION: Fall, low back pain.  TECHNIQUE: Multiple axial CT imaging is obtained of lumbar spine without the administration of intravenous contrast. Coronal and sagittal reformatted images were submitted to further facilitate diagnostic accuracy and treatment planning.  The radiation dose reduction device was turned on for each scan per the ALARA (As Low as Reasonably Achievable) protocol.  COMPARISON: None  FINDINGS: The vertebral body height and disc spaces are preserved. Facets are well aligned. Pedicles are intact. There are mild degenerative changes seen in the SA joints bilaterally. There is no loss of height of the vertebral bodies with disc space narrowing seen at the L3/L4, L4/L5 and L5/S1 levels. There is no fracture or dislocation. Mild anterior spurring involving the L1, L2, and L3 levels. Facets are well aligned. Pedicles are intact.      Impression: Multilevel degenerative changes identified diffusely throughout the lumbar spine with no evidence of fracture or dislocation.  DICTATED:   11/12/2021 EDITED/lfs:   11/12/2021             I have reviewed the medications:  Scheduled Meds:aspirin EC, 325 mg, Oral, Daily  buPROPion XL, 300 mg, Oral, Daily  cefTRIAXone, 1 g, Intravenous, Q24H  clopidogrel, 75 mg, Oral, Daily  fluticasone, 1 spray, Nasal, Daily  pantoprazole, 40 mg, Oral, Daily  pravastatin, 20 mg, Oral, Nightly  sodium chloride, 10 mL, Intravenous, Q12H      Continuous Infusions:   PRN Meds:.•  acetaminophen **OR** acetaminophen **OR** acetaminophen  •  ALPRAZolam  •  benzonatate  •  [COMPLETED] Insert peripheral IV **AND** sodium chloride  •  sodium chloride    Assessment/Plan   Assessment & Plan     Active Hospital Problems    Diagnosis  POA   • **Acute hyponatremia [E87.1]  Yes   • Fall [W19.XXXA]  Yes   • Rib fractures [S22.49XA]  Yes   • Bacteriuria [R82.71]  Yes   • CAD (coronary artery  disease) [I25.10]  Yes   • Hypertension [I10]  Yes   • Hyperlipidemia [E78.5]  Yes   • GERD (gastroesophageal reflux disease) [K21.9]  Yes   • Anxiety [F41.9]  Yes      Resolved Hospital Problems   No resolved problems to display.        Brief Hospital Course to date:  71 year old female presents to the ED accompanied by son due to fall  with confusion. Na 115 on presentation. Nephrology consulted.      Acute hyponatremia  -likely due to restarting losartan-HCTZ: hold  - Initial Na 115; increased to 123 and D5 started; now down to 121 and 118  - Neph consulted -- fluid restrict 1200 ml/day; hold HCTZ; DC heparin and start SCDs  - Na ordered q4 hours -- currently 123; goal increase 6-8 per 24 hrs     Fall   Rib fractures   -CT of head showed scalp hematoma overlying the right posterior parietal bone.  Chronic changes seen within the brain with no acute intracranial abnormality.  -fall precautions  -CT of lumbar spine, thoracic spine as mentioned above  -CT of chest with noted rib fractures   - PT consulted      Elevated creatinine on CKD III   - issue with renal function for quite some time per patient   -likely secondary to recent bactrim; diuretics   -baseline creatinine 1.3-1.7  -hold nephrotoxic agents  -monitor      Bacteriuria  -on bactrim: hold  - UCx normal michael  - Rocephin x 3 days      generalized anxiety   -on xanax - continue home [facility]   -[facility] verified      Essential hypertension   - hold losartan HCTZ   - Currently BP controlled      Hyperlipidemia  -continue statin      CAD  -continue DAPT     DVT prophylaxis:  Mechanical DVT prophylaxis orders are present.       AM-PAC 6 Clicks Score (PT): 15 (11/13/21 0800)    Disposition: I expect the patient to be discharged TBD    CODE STATUS:   Code Status and Medical Interventions:   Ordered at: 11/12/21 1959     Level Of Support Discussed With:    Patient     Code Status (Patient has no pulse and is not breathing):    CPR (Attempt to Resuscitate)     Medical  Interventions (Patient has pulse or is breathing):    Full Support       Betty Frazier DO  11/14/21

## 2021-11-14 NOTE — CASE MANAGEMENT/SOCIAL WORK
Discharge Planning Assessment  [facility]     Patient Name: Nallely Mccabe  MRN: [id number]  Today's Date: 11/14/2021    Admit Date: 11/12/2021     Discharge Needs Assessment     Row Name 11/14/21 1440       Living Environment    Lives With alone    Current Living Arrangements home/apartment/condo    Primary Care Provided by self    Provides Primary Care For no one    Family Caregiver if Needed child(jacob), adult    Quality of Family Relationships unable to assess    Able to Return to Prior Arrangements yes       Resource/Environmental Concerns    Resource/Environmental Concerns none       Transition Planning    Patient/Family Anticipates Transition to home    Patient/Family Anticipated Services at Transition ; rehabilitation services    Transportation Anticipated family or friend will provide       Discharge Needs Assessment    Readmission Within the Last 30 Days no previous admission in last 30 days    Equipment Currently Used at Home commode; walker, rolling    Concerns to be Addressed discharge planning    Anticipated Changes Related to Illness none               Discharge Plan     Row Name 11/14/21 1440       Plan    Plan Home    Patient/Family in Agreement with Plan yes    Plan Comments Spoke with patient in room to initiate discharge planning.  She live alone in [facility].  She is independent with ADL's.  She has a rolling walker and bedside commode at home.  She is not current with home health.  Her PCP is Shay Rivera.  She has an advanced directive in EPIC.  Ms. Mccabe has RX coverage and has her scripts filled at [facility].  Her goal is to return home at discharge.  Will await therapy recommendations to determine proper discharge placement.  CM will continue to follow.  Courtney Ty RN [phone]    Final Discharge Disposition Code 01 - home or self-care              Continued Care and Services - Admitted Since 11/12/2021    Coordination has not been started for this encounter.           Demographic Summary     Row Name 11/14/21 1439       General Information    Admission Type inpatient    Arrived From emergency department    Referral Source admission list    Reason for Consult discharge planning    Preferred Language English     Used During This Interaction no               Functional Status     Row Name 11/14/21 1440       Functional Status    Usual Activity Tolerance moderate    Current Activity Tolerance moderate       Functional Status, IADL    Medications independent    Meal Preparation independent    Housekeeping independent    Laundry independent    Shopping independent               Psychosocial    No documentation.                Abuse/Neglect    No documentation.                Legal    No documentation.                Substance Abuse    No documentation.                Patient Forms    No documentation.                   Courtney Ty RN

## 2021-11-15 LAB
ANION GAP SERPL CALCULATED.3IONS-SCNC: 9 MMOL/L (ref 5–15)
BUN SERPL-MCNC: 10 MG/DL (ref 8–23)
BUN/CREAT SERPL: 8.3 (ref 7–25)
CALCIUM SPEC-SCNC: 8.6 MG/DL (ref 8.6–10.5)
CHLORIDE SERPL-SCNC: 93 MMOL/L (ref 98–107)
CO2 SERPL-SCNC: 23 MMOL/L (ref 22–29)
CREAT SERPL-MCNC: 1.2 MG/DL (ref 0.57–1)
CREAT UR-MCNC: 39.1 MG/DL
GFR SERPL CREATININE-BSD FRML MDRD: 44 ML/MIN/1.73
GLUCOSE SERPL-MCNC: 114 MG/DL (ref 65–99)
OSMOLALITY UR: 124 MOSM/KG (ref 300–1100)
POTASSIUM SERPL-SCNC: 4.5 MMOL/L (ref 3.5–5.2)
SODIUM SERPL-SCNC: 122 MMOL/L (ref 136–145)
SODIUM SERPL-SCNC: 125 MMOL/L (ref 136–145)
SODIUM SERPL-SCNC: 126 MMOL/L (ref 136–145)
SODIUM SERPL-SCNC: 126 MMOL/L (ref 136–145)
SODIUM SERPL-SCNC: 127 MMOL/L (ref 136–145)
SODIUM UR-SCNC: <20 MMOL/L

## 2021-11-15 PROCEDURE — 97110 THERAPEUTIC EXERCISES: CPT

## 2021-11-15 PROCEDURE — 83935 ASSAY OF URINE OSMOLALITY: CPT | Performed by: INTERNAL MEDICINE

## 2021-11-15 PROCEDURE — 84300 ASSAY OF URINE SODIUM: CPT | Performed by: INTERNAL MEDICINE

## 2021-11-15 PROCEDURE — 84295 ASSAY OF SERUM SODIUM: CPT | Performed by: NURSE PRACTITIONER

## 2021-11-15 PROCEDURE — 80048 BASIC METABOLIC PNL TOTAL CA: CPT | Performed by: INTERNAL MEDICINE

## 2021-11-15 PROCEDURE — 82570 ASSAY OF URINE CREATININE: CPT | Performed by: INTERNAL MEDICINE

## 2021-11-15 PROCEDURE — 97162 PT EVAL MOD COMPLEX 30 MIN: CPT

## 2021-11-15 PROCEDURE — 84295 ASSAY OF SERUM SODIUM: CPT | Performed by: INTERNAL MEDICINE

## 2021-11-15 PROCEDURE — 97116 GAIT TRAINING THERAPY: CPT

## 2021-11-15 PROCEDURE — 99232 SBSQ HOSP IP/OBS MODERATE 35: CPT | Performed by: STUDENT IN AN ORGANIZED HEALTH CARE EDUCATION/TRAINING PROGRAM

## 2021-11-15 RX ADMIN — BUPROPION HYDROCHLORIDE 300 MG: 150 TABLET, FILM COATED, EXTENDED RELEASE ORAL at 08:44

## 2021-11-15 RX ADMIN — PRAVASTATIN SODIUM 20 MG: 20 TABLET ORAL at 20:36

## 2021-11-15 RX ADMIN — ACETAMINOPHEN 650 MG: 325 TABLET, FILM COATED ORAL at 13:43

## 2021-11-15 RX ADMIN — ACETAMINOPHEN 650 MG: 325 TABLET, FILM COATED ORAL at 08:55

## 2021-11-15 RX ADMIN — SODIUM CHLORIDE, PRESERVATIVE FREE 10 ML: 5 INJECTION INTRAVENOUS at 20:37

## 2021-11-15 RX ADMIN — CLOPIDOGREL BISULFATE 75 MG: 75 TABLET ORAL at 08:44

## 2021-11-15 RX ADMIN — ACETAMINOPHEN 650 MG: 325 TABLET, FILM COATED ORAL at 20:36

## 2021-11-15 RX ADMIN — ASPIRIN 325 MG: 325 TABLET, COATED ORAL at 08:44

## 2021-11-15 RX ADMIN — PANTOPRAZOLE SODIUM 40 MG: 40 TABLET, DELAYED RELEASE ORAL at 08:44

## 2021-11-15 RX ADMIN — FLUTICASONE PROPIONATE 1 SPRAY: 50 SPRAY, METERED NASAL at 08:44

## 2021-11-15 RX ADMIN — BENZONATATE 100 MG: 100 CAPSULE ORAL at 20:36

## 2021-11-15 RX ADMIN — ALPRAZOLAM 1 MG: 1 TABLET ORAL at 20:36

## 2021-11-15 NOTE — PROGRESS NOTES
[facility] Medicine Services  PROGRESS NOTE    Patient Name: Nallely Mccabe  : 1950  MRN: [id number]    Date of Admission: 2021  Primary Care Physician: Shay Rivera MD    Subjective   Subjective     CC:   hyponatremia     HPI:  Specific complaints, she does state that her confusion is better  ROS:  Gen- No fevers, chills  CV- No chest pain, palpitations  Resp- No cough, dyspnea  GI- No N/V/D, abd pain     Objective   Objective     Vital Signs:   Temp:  [97.7 °F (36.5 °C)-97.8 °F (36.6 °C)] 97.7 °F (36.5 °C)  Heart Rate:  [] 90  Resp:  [16-20] 16  BP: (110-119)/(77-82) 110/77     Physical Exam:  Constitutional: No acute distress, awake, alert  HENT: NCAT, mucous membranes moist  Respiratory: Clear to auscultation bilaterally, respiratory effort normal   Cardiovascular: RRR, no murmurs, rubs, or gallops  Gastrointestinal: Positive bowel sounds, soft, nontender, nondistended  Musculoskeletal: No bilateral ankle edema  Psychiatric: Appropriate affect, cooperative  Neurologic: Oriented x 3 but seems to have some confusion, strength symmetric in all extremities, Cranial Nerves grossly intact to confrontation, speech clear  Skin: No rashes    Results Reviewed:  LAB RESULTS:      Lab 21  0939 21  0303 21  1739   WBC 5.79 8.62 12.07*   HEMOGLOBIN 11.1* 11.6* 11.9*   HEMATOCRIT 33.7* 32.8* 33.6*   PLATELETS 261 272 256   NEUTROS ABS  --  6.12 10.05*   IMMATURE GRANS (ABS)  --  0.05 0.06*   LYMPHS ABS  --  1.20 0.74   MONOS ABS  --  1.15* 1.19*   EOS ABS  --  0.06 0.01   MCV 88.9 82.4 83.8   LACTATE  --   --  1.6   PROTIME  --   --  12.5   APTT  --   --  35.9         Lab 11/15/21  1226 11/15/21  0816 11/15/21  0406 21  2328 21  2044 21  1230 21  0939 21  0702 21  0303 21  1739   SODIUM 126* 127* 125* 122* 121*   < > 121*   < > 121*   < > 115*   POTASSIUM  --   --  4.5  --   --   --  3.9  --  3.9  --   4.4   CHLORIDE  --   --  93*  --   --   --  88*  --  86*  --  79*   CO2  --   --  23.0  --   --   --  20.0*  --  22.0  --  22.0   ANION GAP  --   --  9.0  --   --   --  13.0  --  13.0  --  14.0   BUN  --   --  10  --   --   --  12  --  15  --  16   CREATININE  --   --  1.20*  --   --   --  1.41*  --  1.74*  --  1.83*   GLUCOSE  --   --  114*  --   --   --  103*  --  114*  --  107*   CALCIUM  --   --  8.6  --   --   --  8.8  --  9.0  --  9.2   PHOSPHORUS  --   --   --   --   --   --  3.6  --   --   --   --     < > = values in this interval not displayed.         Lab 11/14/21  0939 11/12/21  1739   TOTAL PROTEIN  --  6.6   ALBUMIN 3.30* 4.00   GLOBULIN  --  2.6   ALT (SGPT)  --  16   AST (SGOT)  --  21   BILIRUBIN  --  0.4   ALK PHOS  --  113         Lab 11/12/21  1739   TROPONIN T <0.010   PROTIME 12.5   INR 0.96             Lab 11/14/21  0939   IRON 83   IRON SATURATION 34   TIBC 241*   TRANSFERRIN 162*         Brief Urine Lab Results  (Last result in the past 365 days)      Color   Clarity   Blood   Leuk Est   Nitrite   Protein   CREAT   Urine HCG        11/15/21 0034             39.1               Microbiology Results Abnormal     Procedure Component Value - Date/Time    Blood Culture - Blood, Hand, Right [[id number]]  (Normal) Collected: 11/12/21 2030    Lab Status: Preliminary result Specimen: Blood from Hand, Right Updated: 11/14/21 2115     Blood Culture No growth at 2 days    Blood Culture - Blood, Arm, Left [[id number]]  (Normal) Collected: 11/12/21 2044    Lab Status: Preliminary result Specimen: Blood from Arm, Left Updated: 11/14/21 2115     Blood Culture No growth at 2 days    Urine Culture - Urine, Urine, Catheter [[id number]] Collected: 11/12/21 1821    Lab Status: Final result Specimen: Urine, Catheter Updated: 11/14/21 1025     Urine Culture 50,000 CFU/mL Normal Urogenital Brisa    COVID PRE-OP / PRE-PROCEDURE SCREENING ORDER (NO ISOLATION) - Swab, Nasopharynx [[id number]]  (Normal) Collected: 11/12/21  1731    Lab Status: Final result Specimen: Swab from Nasopharynx Updated: 11/12/21 1754    Narrative:      The following orders were created for panel order COVID PRE-OP / PRE-PROCEDURE SCREENING ORDER (NO ISOLATION) - Swab, Nasopharynx.  Procedure                               Abnormality         Status                     ---------                               -----------         ------                     COVID-19, ABBOTT IN-HOUS...[[id number]]  Normal              Final result                 Please view results for these tests on the individual orders.    COVID-19, ABBOTT IN-HOUSE,NASAL Swab (NO TRANSPORT MEDIA) 2 HR TAT - Swab, Nasopharynx [[id number]]  (Normal) Collected: 11/12/21 1731    Lab Status: Final result Specimen: Swab from Nasopharynx Updated: 11/12/21 1754     COVID19 Presumptive Negative    Narrative:      Fact sheet for providers: https://www.fda.gov/media/842490/download     Fact sheet for patients: https://www.fda.gov/media/556808/download    Test performed by PCR.  If inconsistent with clinical signs and symptoms patient should be tested with different authorized molecular test.          No radiology results from the last 24 hrs        I have reviewed the medications:  Scheduled Meds:aspirin EC, 325 mg, Oral, Daily  buPROPion XL, 300 mg, Oral, Daily  clopidogrel, 75 mg, Oral, Daily  fluticasone, 1 spray, Nasal, Daily  pantoprazole, 40 mg, Oral, Daily  pravastatin, 20 mg, Oral, Nightly  sodium chloride, 10 mL, Intravenous, Q12H      Continuous Infusions:sodium chloride 0.9 % with KCl 20 mEq, 75 mL/hr, Last Rate: 75 mL/hr (11/15/21 7809)      PRN Meds:.•  acetaminophen **OR** acetaminophen **OR** acetaminophen  •  ALPRAZolam  •  benzonatate  •  [COMPLETED] Insert peripheral IV **AND** sodium chloride  •  sodium chloride    Assessment/Plan   Assessment & Plan     Active Hospital Problems    Diagnosis  POA   • **Acute hyponatremia [E87.1]  Yes   • Fall [W19.XXXA]  Yes   • Rib fractures [S22.49XA]   Yes   • Bacteriuria [R82.71]  Yes   • CAD (coronary artery disease) [I25.10]  Yes   • Hypertension [I10]  Yes   • Hyperlipidemia [E78.5]  Yes   • GERD (gastroesophageal reflux disease) [K21.9]  Yes   • Anxiety [F41.9]  Yes      Resolved Hospital Problems   No resolved problems to display.        Brief Hospital Course to date:  71 year old female presents to the ED accompanied by son due to fall  with confusion. Na 115 on presentation. Nephrology consulted.      Acute hyponatremia  -likely due to restarting losartan-HCTZ: hold  - Initial Na 115; increased to 123 and D5 started; now at 127  - Neph consulted -- fluid restrict 1200 ml/day; hold HCTZ; DC heparin and start SCDs, fluids decreased today  - Na ordered q4 hours -- currently 123; goal increase 6-8 per 24 hrs     Fall   Rib fractures   -CT of head showed scalp hematoma overlying the right posterior parietal bone.  Chronic changes seen within the brain with no acute intracranial abnormality.  -fall precautions  -CT of lumbar spine, thoracic spine as mentioned above  -CT of chest with noted rib fractures   - PT consulted      Elevated creatinine on CKD III   - issue with renal function for quite some time per patient   -likely secondary to recent bactrim; diuretics   -baseline creatinine 1.3-1.7  -hold nephrotoxic agents  -monitor      Bacteriuria  -on bactrim: hold  - UCx normal michael  - Rocephin x 3 days      generalized anxiety   -on xanax - continue home med   -BILLY verified      Essential hypertension   - hold losartan HCTZ   - Currently BP controlled      Hyperlipidemia  -continue statin      CAD  -continue DAPT     DVT prophylaxis:  Mechanical DVT prophylaxis orders are present.       AM-PAC 6 Clicks Score (PT): 16 (11/15/21 1202)    Disposition: I expect the patient to be discharged TBD    CODE STATUS:   Code Status and Medical Interventions:   Ordered at: 11/12/21 0331     Level Of Support Discussed With:    Patient     Code Status (Patient has no  pulse and is not breathing):    CPR (Attempt to Resuscitate)     Medical Interventions (Patient has pulse or is breathing):    Full Support       Kaelyn Moody MD  11/15/21

## 2021-11-15 NOTE — PROGRESS NOTES
"   LOS: 3 days    Patient Care Team:  Shay Rivera MD as PCP - General  Shay Rivera MD as PCP - Family Medicine    Chief Complaint: Fall, hyponatremia  71-year-old history of depression, hyperlipidemia, essential hypertension, CAD, anxiety presented to ED with confusion and fall, has been on HCTZ, serum sodium 115, history of chronic kidney disease for the last 3 years, admitting creatinine 1.83 BUN 16.  Liver enzymes were normal.  Subjective     Interval History:   Sodium appropriate increase 126-127.    Review of Systems:   Oral mucosa better today.  No complaints.    Objective     Vital Sign Min/Max for last 24 hours  Temp  Min: 97.7 °F (36.5 °C)  Max: 98 °F (36.7 °C)   BP  Min: 92/62  Max: 119/82   Pulse  Min: 74  Max: 114   Resp  Min: 16  Max: 20   SpO2  Min: 83 %  Max: 100 %   No data recorded   No data recorded     Flowsheet Rows      First Filed Value   Admission Height 165.1 cm (65\") Documented at 11/12/2021 1458   Admission Weight 88 kg (194 lb) Documented at 11/12/2021 1458          I/O this shift:  In: -   Out: 800 [Urine:800]  I/O last 3 completed shifts:  In: 240 [P.O.:240]  Out: 2650 [Urine:2650]    Physical Exam:  General Appearance: Awake, alert, oriented x4 no obvious distress  Eyes: PER, EOMI.  Oral mucosa moist.  Neck: Supple no JVD.  Lungs: Clear auscultation, no rales rhonchi's, equal chest movement, nonlabored.  Heart: No gallop, murmur, rub, RRR.  Abdomen: Soft, nontender, positive bowel sounds, no organomegaly.  Extremities: No edema, no cyanosis.  Neuro: No focal deficit, moving all extremities, alert oriented X 3  Skin no rash noted skin warm and dry   no suprapubic fullness or tenderness.  Psych mood and affect are normal appropriate.  WBC WBC   Date Value Ref Range Status   11/14/2021 5.79 3.40 - 10.80 10*3/mm3 Final   11/13/2021 8.62 3.40 - 10.80 10*3/mm3 Final   11/12/2021 12.07 (H) 3.40 - 10.80 10*3/mm3 Final      HGB Hemoglobin   Date Value Ref Range Status "   11/14/2021 11.1 (L) 12.0 - 15.9 g/dL Final   11/13/2021 11.6 (L) 12.0 - 15.9 g/dL Final   11/12/2021 11.9 (L) 12.0 - 15.9 g/dL Final      HCT Hematocrit   Date Value Ref Range Status   11/14/2021 33.7 (L) 34.0 - 46.6 % Final   11/13/2021 32.8 (L) 34.0 - 46.6 % Final   11/12/2021 33.6 (L) 34.0 - 46.6 % Final      Platlets No results found for: LABPLAT   MCV MCV   Date Value Ref Range Status   11/14/2021 88.9 79.0 - 97.0 fL Final   11/13/2021 82.4 79.0 - 97.0 fL Final   11/12/2021 83.8 79.0 - 97.0 fL Final          Sodium Sodium   Date Value Ref Range Status   11/15/2021 126 (L) 136 - 145 mmol/L Final   11/15/2021 127 (L) 136 - 145 mmol/L Final   11/15/2021 125 (L) 136 - 145 mmol/L Final   11/14/2021 122 (L) 136 - 145 mmol/L Final   11/14/2021 121 (L) 136 - 145 mmol/L Final   11/14/2021 125 (L) 136 - 145 mmol/L Final   11/14/2021 123 (L) 136 - 145 mmol/L Final   11/14/2021 121 (L) 136 - 145 mmol/L Final   11/14/2021 121 (L) 136 - 145 mmol/L Final   11/13/2021 121 (L) 136 - 145 mmol/L Final   11/13/2021 120 (L) 136 - 145 mmol/L Final   11/13/2021 121 (L) 136 - 145 mmol/L Final   11/13/2021 118 (C) 136 - 145 mmol/L Final   11/13/2021 121 (L) 136 - 145 mmol/L Final   11/12/2021 123 (L) 136 - 145 mmol/L Final   11/12/2021 115 (C) 136 - 145 mmol/L Final      Potassium Potassium   Date Value Ref Range Status   11/15/2021 4.5 3.5 - 5.2 mmol/L Final   11/14/2021 3.9 3.5 - 5.2 mmol/L Final     Comment:     Slight hemolysis detected by analyzer. Results may be affected.   11/13/2021 3.9 3.5 - 5.2 mmol/L Final   11/12/2021 4.4 3.5 - 5.2 mmol/L Final      Chloride Chloride   Date Value Ref Range Status   11/15/2021 93 (L) 98 - 107 mmol/L Final   11/14/2021 88 (L) 98 - 107 mmol/L Final   11/13/2021 86 (L) 98 - 107 mmol/L Final   11/12/2021 79 (L) 98 - 107 mmol/L Final      CO2 CO2   Date Value Ref Range Status   11/15/2021 23.0 22.0 - 29.0 mmol/L Final   11/14/2021 20.0 (L) 22.0 - 29.0 mmol/L Final   11/13/2021 22.0 22.0 - 29.0  mmol/L Final   11/12/2021 22.0 22.0 - 29.0 mmol/L Final      BUN BUN   Date Value Ref Range Status   11/15/2021 10 8 - 23 mg/dL Final   11/14/2021 12 8 - 23 mg/dL Final   11/13/2021 15 8 - 23 mg/dL Final   11/12/2021 16 8 - 23 mg/dL Final      Creatinine Creatinine   Date Value Ref Range Status   11/15/2021 1.20 (H) 0.57 - 1.00 mg/dL Final   11/14/2021 1.41 (H) 0.57 - 1.00 mg/dL Final   11/13/2021 1.74 (H) 0.57 - 1.00 mg/dL Final   11/12/2021 1.83 (H) 0.57 - 1.00 mg/dL Final      Calcium Calcium   Date Value Ref Range Status   11/15/2021 8.6 8.6 - 10.5 mg/dL Final   11/14/2021 8.8 8.6 - 10.5 mg/dL Final   11/13/2021 9.0 8.6 - 10.5 mg/dL Final   11/12/2021 9.2 8.6 - 10.5 mg/dL Final      PO4 No results found for: CAPO4   Albumin Albumin   Date Value Ref Range Status   11/14/2021 3.30 (L) 3.50 - 5.20 g/dL Final   11/12/2021 4.00 3.50 - 5.20 g/dL Final      Magnesium No results found for: MG   Uric Acid No results found for: URICACID        Results Review:     I reviewed the patient's new clinical results.    aspirin EC, 325 mg, Oral, Daily  buPROPion XL, 300 mg, Oral, Daily  clopidogrel, 75 mg, Oral, Daily  fluticasone, 1 spray, Nasal, Daily  pantoprazole, 40 mg, Oral, Daily  pravastatin, 20 mg, Oral, Nightly  sodium chloride, 10 mL, Intravenous, Q12H      sodium chloride 0.9 % with KCl 20 mEq, 100 mL/hr, Last Rate: 100 mL/hr (11/14/21 1401)        Medication Review: Reviewed    Assessment/Plan       Acute hyponatremia    CAD (coronary artery disease)    Hypertension    Hyperlipidemia    GERD (gastroesophageal reflux disease)    Anxiety    Fall    Rib fractures    Bacteriuria     1.  Severe hyponatremia: Asymptomatic at this time.  Patient sodium 118, patient was on hydrochlorothiazide at home.  She has a history of hyponatremia in the past with hydrochlorothiazide which was stopped previously by the primary care physician, patient went on her own to restart it as her blood pressure was running high.  Urine sodium  less than 20, urine osmolality 200.  She also had significant pain from fracture of rib, pain and nausea can increase ADH secretion, also heparin and action on aldosterone secretion as well as tubular action.  2.  CKD stage III: According to the patient patient has this problem for last 3 years.  3.  History of hypertension.  4.  Anemia.    Recommendations:  Decrease IV fluids 75 mL/h.  Last urine sodium less than 20, will give IV fluids monitor sodium closely.  Water restriction less than 1200 mL/day.  Hold HCTZ.  Renal function has improved from yesterday.  Checks serial labs.  Avoid nephrotoxic medications.  Check iron studies.  High risk patient with multiple medical problem.  Case discussed with Dr. Rachelle Frazier.    Check urine labs again in the morning.  Saniya Landa MD  11/15/21  14:31 EST

## 2021-11-15 NOTE — PLAN OF CARE
Goal Outcome Evaluation:  Plan of Care Reviewed With: patient           Outcome Summary: PT eval completed. PT demonstrates good strength, ROM. Pt also performs bed mobility with MOD Ax2, sit<>stand and short distance ambulation with MIN Ax2 which progressed to CGA with RWx x 30 ft. Pt is limited by pain. Pt would benefit from home with 24/7 assist and OPPT.

## 2021-11-15 NOTE — THERAPY EVALUATION
Patient Name: Nallely Mccabe  : 1950    MRN: [id number]                              Today's Date: 11/15/2021       Admit Date: 2021    Visit Dx:     ICD-10-CM ICD-9-CM   1. Acute hyponatremia  E87.1 276.1   2. Frequent falls  R29.6 V15.88   3. Acute renal failure, unspecified acute renal failure type (HCC)  N17.9 584.9   4. Closed fracture of multiple ribs of left side, initial encounter  S22.42XA 807.09   5. Pleural effusion on left  J90 511.9   6. Acute urinary tract infection  N39.0 599.0     Patient Active Problem List   Diagnosis   • CAD (coronary artery disease)   • Hypertension   • Hyperlipidemia   • GERD (gastroesophageal reflux disease)   • Anxiety   • Depression   • Obesity (BMI 30-39.9)   • Metabolic encephalopathy   • Acute hyponatremia   • Fall   • Rib fractures   • Bacteriuria     Past Medical History:   Diagnosis Date   • Anxiety    • CAD (coronary artery disease)    • Depression    • GERD (gastroesophageal reflux disease)    • Hyperlipidemia    • Hypertension    • Obesity (BMI 30.0-34.9)    • Pyelonephritis    • Pyelonephritis, acute 2018    Right sided   • Sepsis (HCC)    • Sepsis (HCC) 2018   • UTI (urinary tract infection)      Past Surgical History:   Procedure Laterality Date   • CORONARY ANGIOPLASTY  10/18/2011   • EYE SURGERY     • ORIF HUMERUS FRACTURE     • SINUS SURGERY        General Information     Row Name 11/15/21 1144          Physical Therapy Time and Intention    Document Type evaluation  -EJ     Mode of Treatment physical therapy  -EJ     Row Name 11/15/21 1144          General Information    Patient Profile Reviewed yes  -EJ     Prior Level of Function independent:; all household mobility; community mobility; driving; ADL's; home management  -EJ     Existing Precautions/Restrictions fall  L rib fx brusing noted RLE leg, LUE elbow, L neck  -EJ     Row Name 11/15/21 1144          Living Environment    Lives With alone  -EJ     Row Name 11/15/21 114           Cognition    Orientation Status (Cognition) oriented x 4  -EJ     Row Name 11/15/21 1144          Safety Issues, Functional Mobility    Safety Issues Affecting Function (Mobility) ability to follow commands  -EJ     Impairments Affecting Function (Mobility) pain; balance  -EJ           User Key  (r) = Recorded By, (t) = Taken By, (c) = Cosigned By    Initials Name Provider Type    EJ Negar Dias PT Physical Therapist               Mobility     Row Name 11/15/21 1149          Bed Mobility    Bed Mobility supine-sit  -EJ     Supine-Sit [place] (Bed Mobility) moderate assist (50% patient effort); 2 person assist  -EJ     Assistive Device (Bed Mobility) bed rails; draw sheet; head of bed elevated  -EJ     Comment (Bed Mobility) increased difficutly 2/2 pain  -EJ     Row Name 11/15/21 1149          Transfers    Comment (Transfers) increased time needed. Pt very slowly stands 2/2 pain in back/flank/ribs; cueing for sequencing and  hand placement with walker. CGA given for t/f with walker  -EJ     Row Name 11/15/21 1149          Bed-Chair Transfer    Assistive Device (Bed-Chair Transfers) --  BUE support  -EJ     Row Name 11/15/21 1149          Sit-Stand Transfer    Sit-Stand [place] (Transfers) minimum assist (75% patient effort); 2 person assist  -EJ     Row Name 11/15/21 1149          Gait/Stairs (Locomotion)    [place] Level (Gait) minimum assist (75% patient effort); 2 person assist  -EJ     Assistive Device (Gait) --  BUE support.  -EJ     Distance in Feet (Gait) 30  -EJ     Deviations/Abnormal Patterns (Gait) antalgic  -EJ     Comment (Gait/Stairs) Pt initially ptakes very small steps with BLEs, given BUE support and cueing for sequencing. Pt progresses to increased step length with cueing. Pt progresses to CGA x1 with RWx. Chair brought up to pt. Cueing for upright posture.  -EJ           User Key  (r) = Recorded By, (t) = Taken By, (c) = Cosigned By    Initials Name Provider Type    EJ  Negar Dias PT Physical Therapist               Obj/Interventions     Row Name 11/15/21 1153          Range of Motion Comprehensive    Comment, General Range of Motion WNL BLE  -     Row Name 11/15/21 1153          Strength Comprehensive (MMT)    Comment, General Manual Muscle Testing (MMT) Assessment 5/5 BLE  -[facility] Name 11/15/21 1153          Motor Skills    Therapeutic Exercise hip; knee; ankle  -[facility] Name 11/15/21 1153          Hip (Therapeutic Exercise)    Hip (Therapeutic Exercise) strengthening exercise  -     Hip Strengthening (Therapeutic Exercise) marching while seated; 10 repetitions; 5 repetitions  -[facility] Name 11/15/21 1153          Knee (Therapeutic Exercise)    Knee (Therapeutic Exercise) strengthening exercise  -     Knee Strengthening (Therapeutic Exercise) bilateral; heel slides; SLR (straight leg raise); LAQ (long arc quad); 5 repetitions; 10 repetitions  -[facility] Name 11/15/21 1153          Ankle (Therapeutic Exercise)    Ankle (Therapeutic Exercise) AROM (active range of motion)  -     Ankle AROM (Therapeutic Exercise) bilateral; dorsiflexion; plantarflexion; 10 repetitions; 5 repetitions  and circles  -[facility] Name 11/15/21 1153          Sensory Assessment (Somatosensory)    Sensory Assessment (Somatosensory) sensation intact  -           User Key  (r) = Recorded By, (t) = Taken By, (c) = Cosigned By    Initials Name Provider Type     Negar Dias PT Physical Therapist               Goals/Plan     [facility] Name 11/15/21 1157          Bed Mobility Goal 1 (PT)    Activity/Assistive Device (Bed Mobility Goal 1, PT) bed mobility activities, all  -     [place] Level/Cues Needed (Bed Mobility Goal 1, PT) minimum assist (75% or more patient effort)  -     Time Frame (Bed Mobility Goal 1, PT) long term goal (LTG); 10 days  -[facility] Name 11/15/21 1157          Transfer Goal 1 (PT)    Activity/Assistive Device (Transfer Goal 1, PT)  sit-to-stand/stand-to-sit  -EJ     [place] Level/Cues Needed (Transfer Goal 1, PT) standby assist  -EJ     Time Frame (Transfer Goal 1, PT) long term goal (LTG); 10 days  -[facility] Name 11/15/21 4999          Gait Training Goal 1 (PT)    Activity/Assistive Device (Gait Training Goal 1, PT) gait (walking locomotion); walker, rolling  -EJ     [place] Level (Gait Training Goal 1, PT) modified independence  -EJ     Distance (Gait Training Goal 1, PT) 300  -EJ           User Key  (r) = Recorded By, (t) = Taken By, (c) = Cosigned By    Initials Name Provider Type     Negar Dias PT Physical Therapist               Clinical Impression     [facility] Name 11/15/21 1158          Pain    Additional Documentation Pain Scale: FACES Pre/Post-Treatment (Group)  -[facility] Name 11/15/21 115          Pain Scale: Numbers Pre/Post-Treatment    Pain Intervention(s) Ambulation/increased activity; Repositioned  -[facility] Name 11/15/21 1150          Pain Scale: FACES Pre/Post-Treatment    Pain: FACES Scale, Pretreatment 2-->hurts little bit  -EJ     Posttreatment Pain Rating 2-->hurts little bit  -EJ     Pre/Posttreatment Pain Comment rib pain, Increased to grimace during mobilization to 6/10  -[facility] Name 11/15/21 0265          Plan of Care Review    Plan of Care Reviewed With patient  -     Outcome Summary PT eval completed. PT demonstrates good strength, ROM. Pt also performs bed mobility with MOD Ax2, sit<>stand and short distance ambulation with MIN Ax2 which progressed to CGA with RWx x 30 ft. Pt is limited by pain. Pt would benefit from home with 24/7 assist and OPPT .  -     Row Name 11/15/21 2983          Therapy Assessment/Plan (PT)    Rehab Potential (PT) good, to achieve stated therapy goals  -     Criteria for Skilled Interventions Met (PT) yes; skilled treatment is necessary  -[facility] Name 11/15/21 1550          Vital Signs    Pre Systolic BP Rehab --  VSS, no orthostatic hypotension  -      Pre Patient Position Sitting  -EJ     Intra Patient Position Standing  -EJ     Post Patient Position Sitting  -EJ     Row Name 11/15/21 1154          Positioning and Restraints    Pre-Treatment Position in bed  -EJ     Post Treatment Position chair  -EJ     In Chair reclined; call light within reach; encouraged to call for assist; with family/caregiver; exit alarm on; waffle cushion  -EJ           User Key  (r) = Recorded By, (t) = Taken By, (c) = Cosigned By    Initials Name Provider Type    Negar Rivera PT Physical Therapist               Outcome Measures     Row Name 11/15/21 1202 11/15/21 0855       How much help from another person do you currently need...    Turning from your back to your side while in flat bed without using bedrails? 3  -EJ 3  -LN    Moving from lying on back to sitting on the side of a flat bed without bedrails? 2  -EJ 3  -LN    Moving to and from a bed to a chair (including a wheelchair)? 3  -EJ 3  -LN    Standing up from a chair using your arms (e.g., wheelchair, bedside chair)? 3  -EJ 2  -LN    Climbing 3-5 steps with a railing? 2  -EJ 2  -LN    To walk in hospital room? 3  -EJ 2  -LN    AM-PAC 6 Clicks Score (PT) 16  -EJ 15  -LN    Row Name 11/15/21 1202          Functional Assessment    Outcome Measure Options AM-PAC 6 Clicks Basic Mobility (PT)  -EJ           User Key  (r) = Recorded By, (t) = Taken By, (c) = Cosigned By    Initials Name Provider Type    Negar Rivera PT Physical Therapist    Isabel Pearce RN Registered Nurse                             Physical Therapy Education                 Title: PT OT SLP Therapies (In Progress)     Topic: Physical Therapy (In Progress)     Point: Mobility training (In Progress)     Learning Progress Summary           Patient Acceptance, E, NR by [facility] at 11/15/2021 1203                   Point: Home exercise program (In Progress)     Learning Progress Summary           Patient Acceptance, E, NR by [facility] at 11/15/2021 1203                    Point: Body mechanics (In Progress)     Learning Progress Summary           Patient Acceptance, E, NR by  at 11/15/2021 1203                   Point: Precautions (In Progress)     Learning Progress Summary           Patient Acceptance, E, NR by  at 11/15/2021 1203                               User Key     Initials Effective Dates Name Provider Type Discipline     06/16/21 -  Negar Dias PT Physical Therapist PT              PT Recommendation and Plan  Planned Therapy Interventions (PT): balance training, bed mobility training, home exercise program, gait training, joint mobilization, lumbar stabilization, manual therapy techniques, motor coordination training, stretching, strengthening, stair training, ROM (range of motion), postural re-education, patient/family education, neuromuscular re-education, transfer training  Plan of Care Reviewed With: patient  Outcome Summary: PT eval completed. PT demonstrates good strength, ROM. Pt also performs bed mobility with MOD Ax2, sit<>stand and short distance ambulation with MIN Ax2 which progressed to CGA with RWx x 30 ft. Pt is limited by pain. Pt would benefit from home with 24/7 assist and OPPT vs rehab 2/2 recent hx of 2 falls.     Time Calculation:    PT Charges     Row Name 11/15/21 1203             Time Calculation    Start Time 1045  -EJ      PT Received On 11/15/21  -EJ      PT Goal Re-Cert Due Date 11/25/21  -EJ              Time Calculation- PT    Total Timed Code Minutes- PT 24 minute(s)  -EJ              Timed Charges    44311 - PT Therapeutic Exercise Minutes 9  -EJ      90100 - Gait Training Minutes  10  -EJ      30887 - PT Therapeutic Activity Minutes 5  -EJ              Untimed Charges    PT Eval/Re-eval Minutes 46  -EJ              Total Minutes    Timed Charges Total Minutes 24  -EJ      Untimed Charges Total Minutes 46  -EJ       Total Minutes 70  -EJ            User Key  (r) = Recorded By, (t) = Taken By, (c) = Cosigned By     Initials Name Provider Type    EJ Negar Dias, PT Physical Therapist              Therapy Charges for Today     Code Description Service Date Service Provider Modifiers Qty    27104070998 HC PT THER PROC EA 15 MIN 11/15/2021 Negar Dias, PT GP 1    74219764172 HC GAIT TRAINING EA 15 MIN 11/15/2021 Negar Dias, PT GP 1    76687754762 HC PT EVAL MOD COMPLEXITY 4 11/15/2021 Negar Dias, PT GP 1    90183608136 HC PT THER SUPP EA 15 MIN 11/15/2021 Negar Dias, PT GP 3          PT G-Codes  Outcome Measure Options: AM-PAC 6 Clicks Basic Mobility (PT)  AM-PAC 6 Clicks Score (PT): 16    Negar Andrade, PT  11/15/2021

## 2021-11-15 NOTE — CASE MANAGEMENT/SOCIAL WORK
Continued Stay Note  [facility]     Patient Name: Nallely Mccabe  MRN: [id number]  Today's Date: 11/15/2021    Admit Date: 11/12/2021     Discharge Plan     Row Name 11/15/21 0933       Plan    Plan Home    Patient/Family in Agreement with Plan yes    Plan Comments Spoke with patient at bedside.  Patient stated that she has a cane, walker and bedside commode at home.  Patient's daughter, who is in OT, will stay with patient after discharge.  Patient's daughter can work from home.  Patient's best friend is an RN and can stay with patient after his shift.  At this moment, patient denies any needs.  CM will continue to follow.    Final Discharge Disposition Code 01 - home or self-care               Discharge Codes    No documentation.               Expected Discharge Date and Time     Expected Discharge Date Expected Discharge Time    Nov 17, 2021             Bell Dick RN

## 2021-11-16 LAB
GLUCOSE BLDC GLUCOMTR-MCNC: 120 MG/DL (ref 70–130)
SODIUM SERPL-SCNC: 128 MMOL/L (ref 136–145)
SODIUM SERPL-SCNC: 129 MMOL/L (ref 136–145)
SODIUM SERPL-SCNC: 130 MMOL/L (ref 136–145)

## 2021-11-16 PROCEDURE — 84295 ASSAY OF SERUM SODIUM: CPT | Performed by: INTERNAL MEDICINE

## 2021-11-16 PROCEDURE — 93010 ELECTROCARDIOGRAM REPORT: CPT | Performed by: INTERNAL MEDICINE

## 2021-11-16 PROCEDURE — 82962 GLUCOSE BLOOD TEST: CPT

## 2021-11-16 PROCEDURE — 97530 THERAPEUTIC ACTIVITIES: CPT

## 2021-11-16 PROCEDURE — 97166 OT EVAL MOD COMPLEX 45 MIN: CPT

## 2021-11-16 PROCEDURE — 93005 ELECTROCARDIOGRAM TRACING: CPT | Performed by: STUDENT IN AN ORGANIZED HEALTH CARE EDUCATION/TRAINING PROGRAM

## 2021-11-16 PROCEDURE — 99232 SBSQ HOSP IP/OBS MODERATE 35: CPT | Performed by: STUDENT IN AN ORGANIZED HEALTH CARE EDUCATION/TRAINING PROGRAM

## 2021-11-16 RX ORDER — ONDANSETRON 4 MG/1
4 TABLET, FILM COATED ORAL EVERY 6 HOURS PRN
Status: DISCONTINUED | OUTPATIENT
Start: 2021-11-16 | End: 2021-11-18 | Stop reason: HOSPADM

## 2021-11-16 RX ORDER — CARVEDILOL 12.5 MG/1
12.5 TABLET ORAL 2 TIMES DAILY WITH MEALS
Status: DISCONTINUED | OUTPATIENT
Start: 2021-11-16 | End: 2021-11-18 | Stop reason: HOSPADM

## 2021-11-16 RX ADMIN — CARVEDILOL 12.5 MG: 12.5 TABLET, FILM COATED ORAL at 11:01

## 2021-11-16 RX ADMIN — CLOPIDOGREL BISULFATE 75 MG: 75 TABLET ORAL at 08:51

## 2021-11-16 RX ADMIN — SODIUM CHLORIDE, PRESERVATIVE FREE 10 ML: 5 INJECTION INTRAVENOUS at 22:20

## 2021-11-16 RX ADMIN — ASPIRIN 325 MG: 325 TABLET, COATED ORAL at 08:51

## 2021-11-16 RX ADMIN — BUPROPION HYDROCHLORIDE 300 MG: 150 TABLET, FILM COATED, EXTENDED RELEASE ORAL at 08:51

## 2021-11-16 RX ADMIN — ACETAMINOPHEN 650 MG: 325 TABLET, FILM COATED ORAL at 16:05

## 2021-11-16 RX ADMIN — PRAVASTATIN SODIUM 20 MG: 20 TABLET ORAL at 22:19

## 2021-11-16 RX ADMIN — PANTOPRAZOLE SODIUM 40 MG: 40 TABLET, DELAYED RELEASE ORAL at 08:51

## 2021-11-16 RX ADMIN — CARVEDILOL 12.5 MG: 12.5 TABLET, FILM COATED ORAL at 22:19

## 2021-11-16 RX ADMIN — ACETAMINOPHEN 650 MG: 325 TABLET, FILM COATED ORAL at 06:21

## 2021-11-16 RX ADMIN — FLUTICASONE PROPIONATE 1 SPRAY: 50 SPRAY, METERED NASAL at 08:52

## 2021-11-16 NOTE — CASE MANAGEMENT/SOCIAL WORK
Continued Stay Note  [facility]     Patient Name: Nallely Mccabe  MRN: [id number]  Today's Date: 11/16/2021    Admit Date: 11/12/2021     Discharge Plan     Row Name 11/16/21 1050       Plan    Plan To be determined    Plan Comments Discussed patient in MDR.  During rounds, patient's heart rate jumped up to the 160's.  Patient was able to perform Valsalva maneuvers and bring her heart rate back down to 100.  Patient's sodium is now 129.  Patient is still having some confusion, however plan is to return home with daughter.  OT has recommended home with home health.  CM will continue to follow.    Final Discharge Disposition Code 30 - still a patient               Discharge Codes    No documentation.               Expected Discharge Date and Time     Expected Discharge Date Expected Discharge Time    Nov 17, 2021             Bell Dick RN

## 2021-11-16 NOTE — CASE MANAGEMENT/SOCIAL WORK
Continued Stay Note  [facility]     Patient Name: Nallely Mccabe  MRN: [id number]  Today's Date: 11/16/2021    Admit Date: 11/12/2021     Discharge Plan     Row Name 11/16/21 1549       Plan    Plan Home with home health    Patient/Family in Agreement with Plan yes    Plan Comments Spoke with patient at bedside.  Both PT and OT are recommending home health.  Patient is agreeable to home health.  Patient's daughter works for [employer], so a referral was sent to [employer].  CM will follow up.    Final Discharge Disposition Code 06 - home with home health care               Discharge Codes    No documentation.               Expected Discharge Date and Time     Expected Discharge Date Expected Discharge Time    Nov 17, 2021             Bell Dick RN

## 2021-11-16 NOTE — DISCHARGE PLACEMENT REQUEST
"Case Management  Bell Arciniega [phone]    Nallely Fontana (71 y.o. Female)             Date of Birth Social Security Number Address Home Phone MRN    1950  [address]  [place] [address] [phone] [id number]    Yazidism Marital Status             Baptism        Admission Date Admission Type Admitting Provider Attending Provider Department, Room/Bed    21 Emergency Kaelyn Moody MD Olariu, Eva, MD [facility] 4G, S451/1    Discharge Date Discharge Disposition Discharge Destination                         Attending Provider: Kaelyn Moody MD    Allergies: No Known Allergies    Isolation: None   Infection: None   Code Status: CPR   Advance Care Planning Activity    Ht: 165.4 cm (65.12\")   Wt: 88 kg (194 lb)    Admission Cmt: None   Principal Problem: Acute hyponatremia [E87.1]                 Active Insurance as of 2021     Primary Coverage     Payor Plan Insurance Group Employer/Plan Group    HUMANA MEDICARE REPLACEMENT HUMANA MEDICARE REPLACEMENT [id number]     Payor Plan Address Payor Plan Phone Number Payor Plan Fax Number Effective Dates    [address] [address] [phone]  2018 - None Entered    [facility] [address]       Subscriber Name Subscriber Birth Date Member ID       NALLELY FONTANA 1950 [id number]                 Emergency Contacts      (Rel.) Home Phone Work Phone Mobile Phone    Nessa Fontana (Daughter) -- -- [phone]    Kathleen Cantrell (Sister) [phone] -- [phone]    Luciano Valadez (Other) -- -- [phone]          [place] [id number] [facility] [address]  Phone:  [phone]  Fax:  [phone] Date: 2021      Ambulatory Referral to Home Health     Patient:  Nallely Fontana MRN:  [id number]   [address]  [place] [id number] :  1950  SSN:    Phone: [phone] Sex:  F      INSURANCE PAYOR PLAN GROUP # SUBSCRIBER ID   Primary:    HUMANA " MEDICARE REPLACEMENT [id number]      Referring Provider Information:  KAELYN MOODY Phone: [phone] Fax: [phone]      Referral Information:   # Visits:  999 Referral Type: Home Health [42]   Urgency:  Routine Referral Reason: Specialty Services Required   Start Date: Nov 16, 2021 End Date:  To be determined by Insurer   Diagnosis: Acute hyponatremia (E87.1 [ICD-10-CM] 276.1 [ICD-9-CM])  Frequent falls (R29.6 [ICD-10-CM] V15.88 [ICD-9-CM])  Acute renal failure, unspecified acute renal failure type (HCC) (N17.9 [ICD-10-CM] 584.9 [ICD-9-CM])  Closed fracture of multiple ribs of left side, initial encounter (S22.42XA [ICD-10-CM] 807.09 [ICD-9-CM])  Pleural effusion on left (J90 [ICD-10-CM] 511.9 [ICD-9-CM])      Refer to Dept:   Refer to Provider:   Refer to Facility:       Face to Face Visit Date: 11/16/2021  Follow-up provider for Plan of Care? I treated the patient in an acute care facility and will not continue treatment after discharge.  Follow-up provider: JAYJAY NOLEN [[id number]]  Reason/Clinical Findings: acute hyponatremia  Describe mobility limitations that make leaving home difficult: impaired functional mobility, gait, balance and endurance  Nursing/Therapeutic Services Requested: Physical Therapy  Nursing/Therapeutic Services Requested: Occupational Therapy  PT orders: Strengthening  PT orders: Therapeutic exercise  Occupational orders: Activities of daily living  Occupational orders: Strengthening  Frequency: 1 Week 1     This document serves as a request of services and does not constitute Insurance authorization or approval of services.  To determine eligibility, please contact the members Insurance carrier to verify and review coverage.     If you have medical questions regarding this request for services. Please contact [address] at [phone] during normal business hours.         Authorizing Provider:Kaelyn Moody MD  Authorizing Provider's NPI:  [id number]  Order Entered By: Bell Arciniega RN 2021  3:36 PM     Electronically signed by: Kaelyn Moody MD 2021  3:36 PM               Physician Progress Notes (most recent note)      Kaelyn Moody MD at 21 1444              [facility] Medicine Services  PROGRESS NOTE    Patient Name: Nallely Mccabe  : 1950  MRN: [id number]    Date of Admission: 2021  Primary Care Physician: Shay Rivera MD    Subjective   Subjective     CC:   hyponatremia     HPI:  Working with PT OT.  She continues to endorse significant pain certain movements    ROS:  Gen- No fevers, chills  CV- No chest pain, palpitations  Resp- No cough, dyspnea  GI- No N/V/D, abd pain     Objective   Objective     Vital Signs:   Temp:  [97.3 °F (36.3 °C)-98.8 °F (37.1 °C)] 98.8 °F (37.1 °C)  Heart Rate:  [] 79  Resp:  [18-19] 19  BP: (126-136)/() 130/79     Physical Exam:  Constitutional: No acute distress, awake, alert  HENT: NCAT, mucous membranes moist  Respiratory: Clear to auscultation bilaterally, respiratory effort normal   Cardiovascular: RRR, no murmurs, rubs, or gallops  Gastrointestinal: Positive bowel sounds, soft, nontender, nondistended  Musculoskeletal: No bilateral ankle edema  Psychiatric: Appropriate affect, cooperative  Neurologic: Oriented x 3 but seems to have some confusion, strength symmetric in all extremities, Cranial Nerves grossly intact to confrontation, speech clear  Skin: No rashes    Results Reviewed:  LAB RESULTS:      Lab 21  0939 21  0303 21  1739   WBC 5.79 8.62 12.07*   HEMOGLOBIN 11.1* 11.6* 11.9*   HEMATOCRIT 33.7* 32.8* 33.6*   PLATELETS 261 272 256   NEUTROS ABS  --  6.12 10.05*   IMMATURE GRANS (ABS)  --  0.05 0.06*   LYMPHS ABS  --  1.20 0.74   MONOS ABS  --  1.15* 1.19*   EOS ABS  --  0.06 0.01   MCV 88.9 82.4 83.8   LACTATE  --   --  1.6   PROTIME  --   --  12.5   APTT  --   --  35.9         Lab 21  0747  11/16/21  0002 11/15/21  1611 11/15/21  1226 11/15/21  0816 11/15/21  0406 11/15/21  0406 11/14/21  1230 11/14/21  0939 11/13/21  0702 11/13/21  0303 11/12/21  2044 11/12/21  1739   SODIUM 129* 128* 126* 126* 127*   < > 125*   < > 121*   < > 121*   < > 115*   POTASSIUM  --   --   --   --   --   --  4.5  --  3.9  --  3.9  --  4.4   CHLORIDE  --   --   --   --   --   --  93*  --  88*  --  86*  --  79*   CO2  --   --   --   --   --   --  23.0  --  20.0*  --  22.0  --  22.0   ANION GAP  --   --   --   --   --   --  9.0  --  13.0  --  13.0  --  14.0   BUN  --   --   --   --   --   --  10  --  12  --  15  --  16   CREATININE  --   --   --   --   --   --  1.20*  --  1.41*  --  1.74*  --  1.83*   GLUCOSE  --   --   --   --   --   --  114*  --  103*  --  114*  --  107*   CALCIUM  --   --   --   --   --   --  8.6  --  8.8  --  9.0  --  9.2   PHOSPHORUS  --   --   --   --   --   --   --   --  3.6  --   --   --   --     < > = values in this interval not displayed.         Lab 11/14/21  0939 11/12/21  1739   TOTAL PROTEIN  --  6.6   ALBUMIN 3.30* 4.00   GLOBULIN  --  2.6   ALT (SGPT)  --  16   AST (SGOT)  --  21   BILIRUBIN  --  0.4   ALK PHOS  --  113         Lab 11/12/21  1739   TROPONIN T <0.010   PROTIME 12.5   INR 0.96             Lab 11/14/21  0939   IRON 83   IRON SATURATION 34   TIBC 241*   TRANSFERRIN 162*         Brief Urine Lab Results  (Last result in the past 365 days)      Color   Clarity   Blood   Leuk Est   Nitrite   Protein   CREAT   Urine HCG        11/15/21 0034             39.1               Microbiology Results Abnormal     Procedure Component Value - Date/Time    Blood Culture - Blood, Hand, Right [[id number]]  (Normal) Collected: 11/12/21 2030    Lab Status: Preliminary result Specimen: Blood from Hand, Right Updated: 11/15/21 2116     Blood Culture No growth at 3 days    Blood Culture - Blood, Arm, Left [[id number]]  (Normal) Collected: 11/12/21 2044    Lab Status: Preliminary result Specimen: Blood from  Arm, Left Updated: 11/15/21 2115     Blood Culture No growth at 3 days    Urine Culture - Urine, Urine, Catheter [[id number]] Collected: 11/12/21 1821    Lab Status: Final result Specimen: Urine, Catheter Updated: 11/14/21 1025     Urine Culture 50,000 CFU/mL Normal Urogenital Brisa    COVID PRE-OP / PRE-PROCEDURE SCREENING ORDER (NO ISOLATION) - Swab, Nasopharynx [[id number]]  (Normal) Collected: 11/12/21 1731    Lab Status: Final result Specimen: Swab from Nasopharynx Updated: 11/12/21 1754    Narrative:      The following orders were created for panel order COVID PRE-OP / PRE-PROCEDURE SCREENING ORDER (NO ISOLATION) - Swab, Nasopharynx.  Procedure                               Abnormality         Status                     ---------                               -----------         ------                     COVID-19, ABBOTT IN-HOUS...[[id number]]  Normal              Final result                 Please view results for these tests on the individual orders.    COVID-19, ABBOTT IN-HOUSE,NASAL Swab (NO TRANSPORT MEDIA) 2 HR TAT - Swab, Nasopharynx [[id number]]  (Normal) Collected: 11/12/21 1731    Lab Status: Final result Specimen: Swab from Nasopharynx Updated: 11/12/21 1754     COVID19 Presumptive Negative    Narrative:      Fact sheet for providers: https://www.fda.gov/media/171441/download     Fact sheet for patients: https://www.fda.gov/media/748506/download    Test performed by PCR.  If inconsistent with clinical signs and symptoms patient should be tested with different authorized molecular test.          No radiology results from the last 24 hrs        I have reviewed the medications:  Scheduled Meds:aspirin EC, 325 mg, Oral, Daily  buPROPion XL, 300 mg, Oral, Daily  carvedilol, 12.5 mg, Oral, BID With Meals  clopidogrel, 75 mg, Oral, Daily  fluticasone, 1 spray, Nasal, Daily  pantoprazole, 40 mg, Oral, Daily  pravastatin, 20 mg, Oral, Nightly  sodium chloride, 10 mL, Intravenous, Q12H      Continuous  Infusions:   PRN Meds:.•  acetaminophen **OR** acetaminophen **OR** acetaminophen  •  ALPRAZolam  •  benzonatate  •  ondansetron  •  [COMPLETED] Insert peripheral IV **AND** sodium chloride  •  sodium chloride    Assessment/Plan   Assessment & Plan     Active Hospital Problems    Diagnosis  POA   • **Acute hyponatremia [E87.1]  Yes   • Fall [W19.XXXA]  Yes   • Rib fractures [S22.49XA]  Yes   • Bacteriuria [R82.71]  Yes   • CAD (coronary artery disease) [I25.10]  Yes   • Hypertension [I10]  Yes   • Hyperlipidemia [E78.5]  Yes   • GERD (gastroesophageal reflux disease) [K21.9]  Yes   • Anxiety [F41.9]  Yes      Resolved Hospital Problems   No resolved problems to display.        Brief Hospital Course to date:  71 year old female presents to the ED accompanied by son due to fall  with confusion. Na 115 on presentation. Nephrology consulted.      Acute hyponatremia  -likely due to restarting losartan-HCTZ: Holding  - Initial Na 115; increased to 123 and D5 started; now at 128  -Nephrology following, once serum sodium is improved over 130 will space out spot sodium checks and likely be able to discharge.   -Fluids to end today     Fall   Rib fractures   -CT of head showed scalp hematoma overlying the right posterior parietal bone.  Chronic changes seen within the brain with no acute intracranial abnormality.  -fall precautions  -CT of lumbar spine, thoracic spine as mentioned above  -CT of chest with noted rib fractures   -Working with PT and OT     Elevated creatinine on CKD III   - issue with renal function for quite some time per patient   -likely secondary to recent bactrim; diuretics   -baseline creatinine 1.3-1.7  -hold nephrotoxic agents  -monitor      Bacteriuria  -on bactrim: hold  - UCx normal michael  - Rocephin x 3 days      generalized anxiety   -on xanax - continue home med   -[facility] verified      Essential hypertension   - hold losartan HCTZ   - Currently BP controlled      Hyperlipidemia  -continue  statin      CAD  -continue DAPT     DVT prophylaxis:  Mechanical DVT prophylaxis orders are present.       AM-PAC 6 Clicks Score (PT): 16 (21 0930)    Disposition: I expect the patient to be discharged TBD    CODE STATUS:   Code Status and Medical Interventions:   Ordered at: 21     Level Of Support Discussed With:    Patient     Code Status (Patient has no pulse and is not breathing):    CPR (Attempt to Resuscitate)     Medical Interventions (Patient has pulse or is breathing):    Full Support       Kaelyn Moody MD  21                Electronically signed by Kaelyn Moody MD at 21 7700          Physical Therapy Notes (most recent note)      Negar Dias, PT at 11/15/21 1204  Version 2 of 2         Patient Name: Nallely Mccabe  : 1950    MRN: [id number]                              Today's Date: 11/15/2021       Admit Date: 2021    Visit Dx:     ICD-10-CM ICD-9-CM   1. Acute hyponatremia  E87.1 276.1   2. Frequent falls  R29.6 V15.88   3. Acute renal failure, unspecified acute renal failure type (HCC)  N17.9 584.9   4. Closed fracture of multiple ribs of left side, initial encounter  S22.42XA 807.09   5. Pleural effusion on left  J90 511.9   6. Acute urinary tract infection  N39.0 599.0     Patient Active Problem List   Diagnosis   • CAD (coronary artery disease)   • Hypertension   • Hyperlipidemia   • GERD (gastroesophageal reflux disease)   • Anxiety   • Depression   • Obesity (BMI 30-39.9)   • Metabolic encephalopathy   • Acute hyponatremia   • Fall   • Rib fractures   • Bacteriuria     Past Medical History:   Diagnosis Date   • Anxiety    • CAD (coronary artery disease)    • Depression    • GERD (gastroesophageal reflux disease)    • Hyperlipidemia    • Hypertension    • Obesity (BMI 30.0-34.9)    • Pyelonephritis    • Pyelonephritis, acute 2018    Right sided   • Sepsis (HCC)    • Sepsis (HCC) 2018   • UTI (urinary tract infection)      Past Surgical  History:   Procedure Laterality Date   • CORONARY ANGIOPLASTY  10/18/2011   • EYE SURGERY     • ORIF HUMERUS FRACTURE     • SINUS SURGERY        General Information     [facility] Name 11/15/21 1144          Physical Therapy Time and Intention    Document Type evaluation  -EJ     Mode of Treatment physical therapy  -[facility] Name 11/15/21 1144          General Information    Patient Profile Reviewed yes  -EJ     Prior Level of Function independent:; all household mobility; community mobility; driving; ADL's; home management  -EJ     Existing Precautions/Restrictions fall  L rib fx brusing noted RLE leg, LUE elbow, L neck  -     Row Name 11/15/21 1144          Living Environment    Lives With alone  -[facility] Name 11/15/21 1144          Cognition    Orientation Status (Cognition) oriented x 4  -     Row Name 11/15/21 1144          Safety Issues, Functional Mobility    Safety Issues Affecting Function (Mobility) ability to follow commands  -     Impairments Affecting Function (Mobility) pain; balance  -EJ           User Key  (r) = Recorded By, (t) = Taken By, (c) = Cosigned By    Initials Name Provider Type    EJ Negar Dias PT Physical Therapist               Mobility     Row Name 11/15/21 1149          Bed Mobility    Bed Mobility supine-sit  -EJ     Supine-Sit [place] (Bed Mobility) moderate assist (50% patient effort); 2 person assist  -EJ     Assistive Device (Bed Mobility) bed rails; draw sheet; head of bed elevated  -EJ     Comment (Bed Mobility) increased difficutly 2/2 pain  -     Row Name 11/15/21 1149          Transfers    Comment (Transfers) increased time needed. Pt very slowly stands 2/2 pain in back/flank/ribs; cueing for sequencing and  hand placement with walker. CGA given for t/f with walker  -     Row Name 11/15/21 1149          Bed-Chair Transfer    Assistive Device (Bed-Chair Transfers) --  BUE support  -[facility] Name 11/15/21 1149          Sit-Stand Transfer    Sit-Stand  [place] (Transfers) minimum assist (75% patient effort); 2 person assist  -[facility] Name 11/15/21 1149          Gait/Stairs (Locomotion)    Summers Level (Gait) minimum assist (75% patient effort); 2 person assist  -     Assistive Device (Gait) --  BUE support.  -EJ     Distance in Feet (Gait) 30  -EJ     Deviations/Abnormal Patterns (Gait) antalgic  -EJ     Comment (Gait/Stairs) Pt initially ptakes very small steps with BLEs, given BUE support and cueing for sequencing. Pt progresses to increased step length with cueing. Pt progresses to CGA x1 with RWx. Chair brought up to pt. Cueing for upright posture.  -EJ           User Key  (r) = Recorded By, (t) = Taken By, (c) = Cosigned By    Initials Name Provider Type    Negar Rivera PT Physical Therapist               Obj/Interventions     [facility] Name 11/15/21 1153          Range of Motion Comprehensive    Comment, General Range of Motion WNL BLE  -EJ     Row Name 11/15/21 1153          Strength Comprehensive (MMT)    Comment, General Manual Muscle Testing (MMT) Assessment 5/5 BLE  -[facility] Name 11/15/21 1153          Motor Skills    Therapeutic Exercise hip; knee; ankle  -EJ     Row Name 11/15/21 1153          Hip (Therapeutic Exercise)    Hip (Therapeutic Exercise) strengthening exercise  -     Hip Strengthening (Therapeutic Exercise) marching while seated; 10 repetitions; 5 repetitions  -EJ     Row Name 11/15/21 1153          Knee (Therapeutic Exercise)    Knee (Therapeutic Exercise) strengthening exercise  -     Knee Strengthening (Therapeutic Exercise) bilateral; heel slides; SLR (straight leg raise); LAQ (long arc quad); 5 repetitions; 10 repetitions  -[facility] Name 11/15/21 1153          Ankle (Therapeutic Exercise)    Ankle (Therapeutic Exercise) AROM (active range of motion)  -     Ankle AROM (Therapeutic Exercise) bilateral; dorsiflexion; plantarflexion; 10 repetitions; 5 repetitions  and circles  -[facility] Name 11/15/21 1153           Sensory Assessment (Somatosensory)    Sensory Assessment (Somatosensory) sensation intact  -EJ           User Key  (r) = Recorded By, (t) = Taken By, (c) = Cosigned By    Initials Name Provider Type    Negar Rivera PT Physical Therapist               Goals/Plan     Row Name 11/15/21 1157          Bed Mobility Goal 1 (PT)    Activity/Assistive Device (Bed Mobility Goal 1, PT) bed mobility activities, all  -EJ     [place] Level/Cues Needed (Bed Mobility Goal 1, PT) minimum assist (75% or more patient effort)  -EJ     Time Frame (Bed Mobility Goal 1, PT) long term goal (LTG); 10 days  -     Row Name 11/15/21 1157          Transfer Goal 1 (PT)    Activity/Assistive Device (Transfer Goal 1, PT) sit-to-stand/stand-to-sit  -EJ     [place] Level/Cues Needed (Transfer Goal 1, PT) standby assist  -EJ     Time Frame (Transfer Goal 1, PT) long term goal (LTG); 10 days  -     Row Name 11/15/21 1157          Gait Training Goal 1 (PT)    Activity/Assistive Device (Gait Training Goal 1, PT) gait (walking locomotion); walker, rolling  -EJ     [place] Level (Gait Training Goal 1, PT) modified independence  -EJ     Distance (Gait Training Goal 1, PT) 300  -EJ           User Key  (r) = Recorded By, (t) = Taken By, (c) = Cosigned By    Initials Name Provider Type    Negar Rivera PT Physical Therapist               Clinical Impression     Row Name 11/15/21 1081          Pain    Additional Documentation Pain Scale: FACES Pre/Post-Treatment (Group)  -[facility] Name 11/15/21 7185          Pain Scale: Numbers Pre/Post-Treatment    Pain Intervention(s) Ambulation/increased activity; Repositioned  -     Row Name 11/15/21 8558          Pain Scale: FACES Pre/Post-Treatment    Pain: FACES Scale, Pretreatment 2-->hurts little bit  -EJ     Posttreatment Pain Rating 2-->hurts little bit  -EJ     Pre/Posttreatment Pain Comment rib pain, Increased to grimace during mobilization to 6/10  -[facility]  Name 11/15/21 1154          Plan of Care Review    Plan of Care Reviewed With patient  -EJ     Outcome Summary PT eval completed. PT demonstrates good strength, ROM. Pt also performs bed mobility with MOD Ax2, sit<>stand and short distance ambulation with MIN Ax2 which progressed to CGA with RWx x 30 ft. Pt is limited by pain. Pt would benefit from home with 24/7 assist and OPPT .  -EJ     Row Name 11/15/21 1154          Therapy Assessment/Plan (PT)    Rehab Potential (PT) good, to achieve stated therapy goals  -EJ     Criteria for Skilled Interventions Met (PT) yes; skilled treatment is necessary  -EJ     Row Name 11/15/21 1154          Vital Signs    Pre Systolic BP Rehab --  VSS, no orthostatic hypotension  -EJ     Pre Patient Position Sitting  -EJ     Intra Patient Position Standing  -EJ     Post Patient Position Sitting  -EJ     Row Name 11/15/21 1154          Positioning and Restraints    Pre-Treatment Position in bed  -EJ     Post Treatment Position chair  -EJ     In Chair reclined; call light within reach; encouraged to call for assist; with family/caregiver; exit alarm on; waffle cushion  -EJ           User Key  (r) = Recorded By, (t) = Taken By, (c) = Cosigned By    Initials Name Provider Type    EJ Negar Dias, PT Physical Therapist               Outcome Measures     Row Name 11/15/21 1202 11/15/21 0895       How much help from another person do you currently need...    Turning from your back to your side while in flat bed without using bedrails? 3  -EJ 3  -LN    Moving from lying on back to sitting on the side of a flat bed without bedrails? 2  -EJ 3  -LN    Moving to and from a bed to a chair (including a wheelchair)? 3  -EJ 3  -LN    Standing up from a chair using your arms (e.g., wheelchair, bedside chair)? 3  -EJ 2  -LN    Climbing 3-5 steps with a railing? 2  -EJ 2  -LN    To walk in hospital room? 3  -EJ 2  -LN    AM-PAC 6 Clicks Score (PT) 16  -EJ 15  -LN    Row Name 11/15/21 1202           Functional Assessment    Outcome Measure Options AM-PAC 6 Clicks Basic Mobility (PT)  -EJ           User Key  (r) = Recorded By, (t) = Taken By, (c) = Cosigned By    Initials Name Provider Type    Negar Rivera PT Physical Therapist    Isabel Pearce, RN Registered Nurse                             Physical Therapy Education                 Title: PT OT SLP Therapies (In Progress)     Topic: Physical Therapy (In Progress)     Point: Mobility training (In Progress)     Learning Progress Summary           Patient Acceptance, E, NR by  at 11/15/2021 1203                   Point: Home exercise program (In Progress)     Learning Progress Summary           Patient Acceptance, E, NR by  at 11/15/2021 1203                   Point: Body mechanics (In Progress)     Learning Progress Summary           Patient Acceptance, E, NR by  at 11/15/2021 1203                   Point: Precautions (In Progress)     Learning Progress Summary           Patient Acceptance, E, NR by  at 11/15/2021 1203                               User Key     Initials Effective Dates Name Provider Type [facility] 06/16/21 -  Negar Dias PT Physical Therapist PT              PT Recommendation and Plan  Planned Therapy Interventions (PT): balance training, bed mobility training, home exercise program, gait training, joint mobilization, lumbar stabilization, manual therapy techniques, motor coordination training, stretching, strengthening, stair training, ROM (range of motion), postural re-education, patient/family education, neuromuscular re-education, transfer training  Plan of Care Reviewed With: patient  Outcome Summary: PT eval completed. PT demonstrates good strength, ROM. Pt also performs bed mobility with MOD Ax2, sit<>stand and short distance ambulation with MIN Ax2 which progressed to CGA with RWx x 30 ft. Pt is limited by pain. Pt would benefit from home with 24/7 assist and OPPT vs rehab 2/2 recent hx of 2  falls.     Time Calculation:    PT Charges     Row Name 11/15/21 1203             Time Calculation    Start Time 1045  -EJ      PT Received On 11/15/21  -EJ      PT Goal Re-Cert Due Date 21  -EJ              Time Calculation- PT    Total Timed Code Minutes- PT 24 minute(s)  -EJ              Timed Charges    19395 - PT Therapeutic Exercise Minutes 9  -EJ      50452 - Gait Training Minutes  10  -EJ      39827 - PT Therapeutic Activity Minutes 5  -EJ              Untimed Charges    PT Eval/Re-eval Minutes 46  -EJ              Total Minutes    Timed Charges Total Minutes 24  -EJ      Untimed Charges Total Minutes 46  -EJ       Total Minutes 70  -EJ            User Key  (r) = Recorded By, (t) = Taken By, (c) = Cosigned By    Initials Name Provider Type    EJ Negar Dias, PT Physical Therapist              Therapy Charges for Today     Code Description Service Date Service Provider Modifiers Qty    66795572549 HC PT THER PROC EA 15 MIN 11/15/2021 Negar Dias, PT GP 1    54742372599 HC GAIT TRAINING EA 15 MIN 11/15/2021 Negar Dias, PT GP 1    81578965663 HC PT EVAL MOD COMPLEXITY 4 11/15/2021 Negar Dias, PT GP 1    61351766201 HC PT THER SUPP EA 15 MIN 11/15/2021 Negar Dias, PT GP 3          PT G-Codes  Outcome Measure Options: AM-PAC 6 Clicks Basic Mobility (PT)  AM-PAC 6 Clicks Score (PT): 16    Negar Andrade PT  11/15/2021      Electronically signed by Negar Dias PT at 11/15/21 1206     Negar Dias PT at 11/15/21 1204  Version 1 of 2         Patient Name: Nallely Mccabe  : 1950    MRN: [id number]                              Today's Date: 11/15/2021       Admit Date: 2021    Visit Dx:     ICD-10-CM ICD-9-CM   1. Acute hyponatremia  E87.1 276.1   2. Frequent falls  R29.6 V15.88   3. Acute renal failure, unspecified acute renal failure type (HCC)  N17.9 584.9   4. Closed fracture of multiple ribs of left side, initial encounter  S22.42XA  807.09   5. Pleural effusion on left  J90 511.9   6. Acute urinary tract infection  N39.0 599.0     Patient Active Problem List   Diagnosis   • CAD (coronary artery disease)   • Hypertension   • Hyperlipidemia   • GERD (gastroesophageal reflux disease)   • Anxiety   • Depression   • Obesity (BMI 30-39.9)   • Metabolic encephalopathy   • Acute hyponatremia   • Fall   • Rib fractures   • Bacteriuria     Past Medical History:   Diagnosis Date   • Anxiety    • CAD (coronary artery disease)    • Depression    • GERD (gastroesophageal reflux disease)    • Hyperlipidemia    • Hypertension    • Obesity (BMI 30.0-34.9)    • Pyelonephritis    • Pyelonephritis, acute 5/24/2018    Right sided   • Sepsis (HCC)    • Sepsis (HCC) 5/23/2018   • UTI (urinary tract infection)      Past Surgical History:   Procedure Laterality Date   • CORONARY ANGIOPLASTY  10/18/2011   • EYE SURGERY     • ORIF HUMERUS FRACTURE     • SINUS SURGERY        General Information     Row Name 11/15/21 1144          Physical Therapy Time and Intention    Document Type evaluation  -EJ     Mode of Treatment physical therapy  -EJ     Row Name 11/15/21 1144          General Information    Patient Profile Reviewed yes  -EJ     Prior Level of Function independent:; all household mobility; community mobility; driving; ADL's; home management  -EJ     Existing Precautions/Restrictions fall  L rib fx brusing noted RLE leg, LUE elbow, L neck  -EJ     Row Name 11/15/21 1144          Living Environment    Lives With alone  -EJ     Row Name 11/15/21 1144          Cognition    Orientation Status (Cognition) oriented x 4  -EJ     Row Name 11/15/21 1144          Safety Issues, Functional Mobility    Safety Issues Affecting Function (Mobility) ability to follow commands  -EJ     Impairments Affecting Function (Mobility) pain; balance  -EJ           User Key  (r) = Recorded By, (t) = Taken By, (c) = Cosigned By    Initials Name Provider Type    EJ Negar Dias, PT  Physical Therapist               Mobility     Row Name 11/15/21 1149          Bed Mobility    Bed Mobility supine-sit  -EJ     Supine-Sit [place] (Bed Mobility) moderate assist (50% patient effort); 2 person assist  -EJ     Assistive Device (Bed Mobility) bed rails; draw sheet; head of bed elevated  -EJ     Comment (Bed Mobility) increased difficutly 2/2 pain  -EJ     Row Name 11/15/21 1149          Transfers    Comment (Transfers) increased time needed. Pt very slowly stands 2/2 pain in back/flank/ribs; cueing for sequencing and  hand placement with walker. CGA given for t/f with walker  -EJ     Row Name 11/15/21 1149          Bed-Chair Transfer    Assistive Device (Bed-Chair Transfers) --  BUE support  -EJ     Row Name 11/15/21 1149          Sit-Stand Transfer    Sit-Stand [place] (Transfers) minimum assist (75% patient effort); 2 person assist  -EJ     Row Name 11/15/21 1149          Gait/Stairs (Locomotion)    [place] Level (Gait) minimum assist (75% patient effort); 2 person assist  -EJ     Assistive Device (Gait) --  BUE support.  -EJ     Distance in Feet (Gait) 30  -EJ     Deviations/Abnormal Patterns (Gait) antalgic  -EJ     Comment (Gait/Stairs) Pt initially ptakes very small steps with BLEs, given BUE support and cueing for sequencing. Pt progresses to increased step length with cueing. Pt progresses to CGA x1 with RWx. Chair brought up to pt. Cueing for upright posture.  -EJ           User Key  (r) = Recorded By, (t) = Taken By, (c) = Cosigned By    Initials Name Provider Type    EJ Negar Dias PT Physical Therapist               Obj/Interventions     Row Name 11/15/21 1153          Range of Motion Comprehensive    Comment, General Range of Motion WNL BLE  -EJ     Row Name 11/15/21 1153          Strength Comprehensive (MMT)    Comment, General Manual Muscle Testing (MMT) Assessment 5/5 BLE  -EJ     Row Name 11/15/21 1153          Motor Skills    Therapeutic Exercise hip; knee; ankle   -     Row Name 11/15/21 1153          Hip (Therapeutic Exercise)    Hip (Therapeutic Exercise) strengthening exercise  -EJ     Hip Strengthening (Therapeutic Exercise) marching while seated; 10 repetitions; 5 repetitions  -     Row Name 11/15/21 1153          Knee (Therapeutic Exercise)    Knee (Therapeutic Exercise) strengthening exercise  -EJ     Knee Strengthening (Therapeutic Exercise) bilateral; heel slides; SLR (straight leg raise); LAQ (long arc quad); 5 repetitions; 10 repetitions  -     Row Name 11/15/21 1153          Ankle (Therapeutic Exercise)    Ankle (Therapeutic Exercise) AROM (active range of motion)  -EJ     Ankle AROM (Therapeutic Exercise) bilateral; dorsiflexion; plantarflexion; 10 repetitions; 5 repetitions  and circles  -     Row Name 11/15/21 1153          Sensory Assessment (Somatosensory)    Sensory Assessment (Somatosensory) sensation intact  -           User Key  (r) = Recorded By, (t) = Taken By, (c) = Cosigned By    Initials Name Provider Type    EJ Negar Dias, PT Physical Therapist               Goals/Plan     Row Name 11/15/21 1157          Bed Mobility Goal 1 (PT)    Activity/Assistive Device (Bed Mobility Goal 1, PT) bed mobility activities, all  -EJ     [place] Level/Cues Needed (Bed Mobility Goal 1, PT) minimum assist (75% or more patient effort)  -EJ     Time Frame (Bed Mobility Goal 1, PT) long term goal (LTG); 10 days  -     Row Name 11/15/21 1157          Transfer Goal 1 (PT)    Activity/Assistive Device (Transfer Goal 1, PT) sit-to-stand/stand-to-sit  -EJ     [place] Level/Cues Needed (Transfer Goal 1, PT) standby assist  -EJ     Time Frame (Transfer Goal 1, PT) long term goal (LTG); 10 days  -     Row Name 11/15/21 1157          Gait Training Goal 1 (PT)    Activity/Assistive Device (Gait Training Goal 1, PT) gait (walking locomotion); walker, rolling  -EJ     [place] Level (Gait Training Goal 1, PT) modified independence  -EJ      Distance (Gait Training Goal 1, PT) 300  -EJ           User Key  (r) = Recorded By, (t) = Taken By, (c) = Cosigned By    Initials Name Provider Type    Negar Rivera PT Physical Therapist               Clinical Impression     Row Name 11/15/21 115          Pain    Additional Documentation Pain Scale: FACES Pre/Post-Treatment (Group)  -[facility] Name 11/15/21 1158          Pain Scale: Numbers Pre/Post-Treatment    Pain Intervention(s) Ambulation/increased activity; Repositioned  -[facility] Name 11/15/21 1157          Pain Scale: FACES Pre/Post-Treatment    Pain: FACES Scale, Pretreatment 2-->hurts little bit  -EJ     Posttreatment Pain Rating 2-->hurts little bit  -EJ     Pre/Posttreatment Pain Comment rib pain, Increased to grimace during mobilization to 6/10  -[facility] Name 11/15/21 1156          Plan of Care Review    Plan of Care Reviewed With patient  -EJ     Outcome Summary PT eval completed. PT demonstrates good strength, ROM. Pt also performs bed mobility with MOD Ax2, sit<>stand and short distance ambulation with MIN Ax2 which progressed to CGA with RWx x 30 ft. Pt is limited by pain. Pt would benefit from home with 24/7 assist and OPPT vs rehab 2/2 recent hx of 2 falls.  -     Row Name 11/15/21 1159          Therapy Assessment/Plan (PT)    Rehab Potential (PT) good, to achieve stated therapy goals  -     Criteria for Skilled Interventions Met (PT) yes; skilled treatment is necessary  -[facility] Name 11/15/21 1151          Vital Signs    Pre Systolic BP Rehab --  VSS, no orthostatic hypotension  -EJ     Pre Patient Position Sitting  -EJ     Intra Patient Position Standing  -EJ     Post Patient Position Sitting  -EJ     [facility] Name 11/15/21 1156          Positioning and Restraints    Pre-Treatment Position in bed  -EJ     Post Treatment Position chair  -EJ     In Chair reclined; call light within reach; encouraged to call for assist; with family/caregiver; exit alarm on; waffle cushion  -EJ            User Key  (r) = Recorded By, (t) = Taken By, (c) = Cosigned By    Initials Name Provider Type    Negar Rivera PT Physical Therapist               Outcome Measures     Row Name 11/15/21 1202 11/15/21 0855       How much help from another person do you currently need...    Turning from your back to your side while in flat bed without using bedrails? 3  -EJ 3  -LN    Moving from lying on back to sitting on the side of a flat bed without bedrails? 2  -EJ 3  -LN    Moving to and from a bed to a chair (including a wheelchair)? 3  -EJ 3  -LN    Standing up from a chair using your arms (e.g., wheelchair, bedside chair)? 3  -EJ 2  -LN    Climbing 3-5 steps with a railing? 2  -EJ 2  -LN    To walk in hospital room? 3  -EJ 2  -LN    AM-PAC 6 Clicks Score (PT) 16  -EJ 15  -LN    Row Name 11/15/21 1202          Functional Assessment    Outcome Measure Options AM-PAC 6 Clicks Basic Mobility (PT)  -           User Key  (r) = Recorded By, (t) = Taken By, (c) = Cosigned By    Initials Name Provider Type    Negar Rivera PT Physical Therapist    Isabel Pearce RN Registered Nurse                             Physical Therapy Education                 Title: PT OT SLP Therapies (In Progress)     Topic: Physical Therapy (In Progress)     Point: Mobility training (In Progress)     Learning Progress Summary           Patient Acceptance, E, NR by [facility] at 11/15/2021 1203                   Point: Home exercise program (In Progress)     Learning Progress Summary           Patient Acceptance, E, NR by [facility] at 11/15/2021 1203                   Point: Body mechanics (In Progress)     Learning Progress Summary           Patient Acceptance, E, NR by [facility] at 11/15/2021 1203                   Point: Precautions (In Progress)     Learning Progress Summary           Patient Acceptance, E, NR by [facility] at 11/15/2021 1203                               User Key     Initials Effective Dates Name Provider Type [facility]  06/16/21 -  Negar Dias PT Physical Therapist PT              PT Recommendation and Plan  Planned Therapy Interventions (PT): balance training, bed mobility training, home exercise program, gait training, joint mobilization, lumbar stabilization, manual therapy techniques, motor coordination training, stretching, strengthening, stair training, ROM (range of motion), postural re-education, patient/family education, neuromuscular re-education, transfer training  Plan of Care Reviewed With: patient  Outcome Summary: PT eval completed. PT demonstrates good strength, ROM. Pt also performs bed mobility with MOD Ax2, sit<>stand and short distance ambulation with MIN Ax2 which progressed to CGA with RWx x 30 ft. Pt is limited by pain. Pt would benefit from home with 24/7 assist and OPPT vs rehab 2/2 recent hx of 2 falls.     Time Calculation:    PT Charges     Row Name 11/15/21 1203             Time Calculation    Start Time 1045  -EJ      PT Received On 11/15/21  -EJ      PT Goal Re-Cert Due Date 11/25/21  -EJ              Time Calculation- PT    Total Timed Code Minutes- PT 24 minute(s)  -EJ              Timed Charges    22210 - PT Therapeutic Exercise Minutes 9  -EJ      22565 - Gait Training Minutes  10  -EJ      22266 - PT Therapeutic Activity Minutes 5  -EJ              Untimed Charges    PT Eval/Re-eval Minutes 46  -EJ              Total Minutes    Timed Charges Total Minutes 24  -EJ      Untimed Charges Total Minutes 46  -EJ       Total Minutes 70  -EJ            User Key  (r) = Recorded By, (t) = Taken By, (c) = Cosigned By    Initials Name Provider Type    EJ Negar Dias PT Physical Therapist              Therapy Charges for Today     Code Description Service Date Service Provider Modifiers Qty    10294703157 HC PT THER PROC EA 15 MIN 11/15/2021 Negar Dias, PT GP 1    29721161490 HC GAIT TRAINING EA 15 MIN 11/15/2021 Negar Dias, PT GP 1    92582576957 HC PT EVAL MOD COMPLEXITY 4  11/15/2021 Negar Dias, PT GP 1    [id number]  PT THER SUPP EA 15 MIN 11/15/2021 Negar Dias, PT GP 3          PT G-Codes  Outcome Measure Options: AM-PAC 6 Clicks Basic Mobility (PT)  AM-PAC 6 Clicks Score (PT): 16    Negar Andrade, PT  11/15/2021      Electronically signed by Negar Dias, PT at 11/15/21 1204          Occupational Therapy Notes (most recent note)      Laura Simpson, OT at 21 3181          Patient Name: Nallely Mccabe  : 1950    MRN: [id number]                              Today's Date: 2021       Admit Date: 2021    Visit Dx:     ICD-10-CM ICD-9-CM   1. Acute hyponatremia  E87.1 276.1   2. Frequent falls  R29.6 V15.88   3. Acute renal failure, unspecified acute renal failure type (HCC)  N17.9 584.9   4. Closed fracture of multiple ribs of left side, initial encounter  S22.42XA 807.09   5. Pleural effusion on left  J90 511.9   6. Acute urinary tract infection  N39.0 599.0     Patient Active Problem List   Diagnosis   • CAD (coronary artery disease)   • Hypertension   • Hyperlipidemia   • GERD (gastroesophageal reflux disease)   • Anxiety   • Depression   • Obesity (BMI 30-39.9)   • Metabolic encephalopathy   • Acute hyponatremia   • Fall   • Rib fractures   • Bacteriuria     Past Medical History:   Diagnosis Date   • Anxiety    • CAD (coronary artery disease)    • Depression    • GERD (gastroesophageal reflux disease)    • Hyperlipidemia    • Hypertension    • Obesity (BMI 30.0-34.9)    • Pyelonephritis    • Pyelonephritis, acute 2018    Right sided   • Sepsis (HCC)    • Sepsis (HCC) 2018   • UTI (urinary tract infection)      Past Surgical History:   Procedure Laterality Date   • CORONARY ANGIOPLASTY  10/18/2011   • EYE SURGERY     • ORIF HUMERUS FRACTURE     • SINUS SURGERY        General Information     Row Name 21 1026          OT Time and Intention    Document Type evaluation  -KF     Mode of Treatment occupational  therapy  -KF     Row Name 11/16/21 1026          General Information    Patient Profile Reviewed yes  -KF     Prior Level of Function independent:; all household mobility; bed mobility; ADL's; community mobility; home management  -KF     Existing Precautions/Restrictions fall  -KF     Barriers to Rehab previous functional deficit  -KF     Row Name 11/16/21 1026          Occupational Profile    Environmental Supports and Barriers (Occupational Profile) has FWW at home available; tub shower; BSC available  -KF     Row Name 11/16/21 1026          Living Environment    Lives With alone  states plans to have dtr or friend assist at d/c at home  -KF     Row Name 11/16/21 1026          Home Main Entrance    Number of Stairs, Main Entrance two  -KF     Row Name 11/16/21 1026          Stairs Within Home, Primary    Number of Stairs, Within Home, Primary none  -KF     Row Name 11/16/21 1026          Cognition    Orientation Status (Cognition) oriented x 4  -KF     Row Name 11/16/21 1026          Safety Issues, Functional Mobility    Safety Issues Affecting Function (Mobility) insight into deficits/self-awareness; safety precaution awareness  -KF     Impairments Affecting Function (Mobility) pain; balance; endurance/activity tolerance  -KF           User Key  (r) = Recorded By, (t) = Taken By, (c) = Cosigned By    Initials Name Provider Type    KF Laura Simpson, OT Occupational Therapist                 Mobility/ADL's     Row Name 11/16/21 1027          Bed Mobility    Bed Mobility scooting/bridging; supine-sit  -KF     Scooting/Bridging [place] (Bed Mobility) standby assist  -KF     Supine-Sit [place] (Bed Mobility) standby assist  -KF     Bed Mobility, Safety Issues decreased use of arms for pushing/pulling  -KF     Assistive Device (Bed Mobility) bed rails; head of bed elevated  -KF     Comment (Bed Mobility) improved today with extended time  -KF     Row Name 11/16/21 1027          Transfers    Transfers  sit-stand transfer; bed-chair transfer  -KF     Comment (Transfers) with extended time able to completed STS x2 with CGA at FWW with good safety, able to verbalize safe sequence with FWW  -KF     Bed-Chair [place] (Transfers) contact guard; verbal cues; 1 person assist  -KF     Assistive Device (Bed-Chair Transfers) walker, front-wheeled  -KF     Sit-Stand [place] (Transfers) contact guard; 1 person assist; verbal cues  -KF     Row Name 11/16/21 1027          Sit-Stand Transfer    Assistive Device (Sit-Stand Transfers) walker, front-wheeled  -KF     Row Name 11/16/21 1027          Functional Mobility    Functional Mobility- Ind. Level contact guard assist; 1 person; verbal cues required  -KF     Functional Mobility-Distance (Feet) 8  -KF     Functional Mobility- Safety Issues weight-shifting ability decreased; step length decreased  -KF     Functional Mobility- Comment limited by pain  -KF     Row Name 11/16/21 1027          Activities of Daily Living    BADL Assessment/Intervention upper body dressing; lower body dressing  -     Row Name 11/16/21 1027          Upper Body Dressing Assessment/Training    [place] Level (Upper Body Dressing) don; front opening garment; set up  -KF     Position (Upper Body Dressing) edge of bed sitting  -KF     Row Name 11/16/21 1027          Lower Body Dressing Assessment/Training    [place] Level (Lower Body Dressing) don; socks; minimum assist (75% patient effort); pants/bottoms  -KF     Position (Lower Body Dressing) edge of bed sitting; supported standing  -KF     Comment (Lower Body Dressing) states has reacher available to assist and also discussed compensatory strategies in supine with setup if pain limiting  -KF           User Key  (r) = Recorded By, (t) = Taken By, (c) = Cosigned By    Initials Name Provider Type    KF Laura Simpson OT Occupational Therapist               Obj/Interventions     Row Name 11/16/21 1030          Sensory Assessment  (Somatosensory)    Sensory Assessment (Somatosensory) UE sensation intact  -KF     Row Name 11/16/21 1030          Vision Assessment/Intervention    Visual Impairment/Limitations WFL  -KF     Row Name 11/16/21 1030          Range of Motion Comprehensive    General Range of Motion bilateral upper extremity ROM WNL  -KF     Row Name 11/16/21 1030          Strength Comprehensive (MMT)    General Manual Muscle Testing (MMT) Assessment upper extremity strength deficits identified  -KF     Comment, General Manual Muscle Testing (MMT) Assessment deferred 2/2 pain, demonstrates WFL for ADL completion; B  4/5  -KF     Row Name 11/16/21 1030          Motor Skills    Motor Skills coordination  -KF     Coordination WFL; bilateral; upper extremity  -KF     Row Name 11/16/21 1030          Balance    Balance Assessment sitting static balance; sitting dynamic balance; standing dynamic balance; standing static balance  -KF     Static Sitting Balance WNL; unsupported  -KF     Dynamic Sitting Balance WNL; unsupported  -KF     Static Standing Balance WFL; supported  -KF     Dynamic Standing Balance mild impairment; supported  -KF     Balance Interventions sit to stand; standing; occupation based/functional task; UE activity with balance activity  -KF     Comment, Balance good balance steps to chair overall CGA  -KF           User Key  (r) = Recorded By, (t) = Taken By, (c) = Cosigned By    Initials Name Provider Type    KF Laura Simpson, OT Occupational Therapist               Goals/Plan     Row Name 11/16/21 1034          Bed Mobility Goal 1 (OT)    Activity/Assistive Device (Bed Mobility Goal 1, OT) sidelying to sit/sit to sidelying  -KF     [place] Level/Cues Needed (Bed Mobility Goal 1, OT) supervision required  -KF     Time Frame (Bed Mobility Goal 1, OT) long term goal (LTG); 10 days  -KF     Progress/Outcomes (Bed Mobility Goal 1, OT) goal ongoing  -KF     Row Name 11/16/21 1034          Transfer Goal 1 (OT)     Activity/Assistive Device (Transfer Goal 1, OT) toilet; walker, rolling  -KF     [place] Level/Cues Needed (Transfer Goal 1, OT) contact guard assist  -KF     Time Frame (Transfer Goal 1, OT) long term goal (LTG); 10 days  -KF     Progress/Outcome (Transfer Goal 1, OT) goal ongoing  -KF     Row Name 11/16/21 1034          Toileting Goal 1 (OT)    Activity/Device (Toileting Goal 1, OT) adjust/manage clothing; perform perineal hygiene; commode  -KF     [place] Level/Cues Needed (Toileting Goal 1, OT) set-up required  -KF     Time Frame (Toileting Goal 1, OT) long term goal (LTG); 10 days  -KF     Progress/Outcome (Toileting Goal 1, OT) goal ongoing  -KF     Row Name 11/16/21 1034          Therapy Assessment/Plan (OT)    Planned Therapy Interventions (OT) activity tolerance training; patient/caregiver education/training; BADL retraining; adaptive equipment training; ROM/therapeutic exercise; strengthening exercise; occupation/activity based interventions; cognitive/visual perception retraining; transfer/mobility retraining; functional balance retraining  -KF           User Key  (r) = Recorded By, (t) = Taken By, (c) = Cosigned By    Initials Name Provider Type    KF Laura Simpson, OT Occupational Therapist               Clinical Impression     Row Name 11/16/21 1031          Pain Assessment    Additional Documentation Pain Scale: FACES Pre/Post-Treatment (Group)  -KF     Row Name 11/16/21 1031          Pain Scale: FACES Pre/Post-Treatment    Pain: FACES Scale, Pretreatment 2-->hurts little bit  -KF     Posttreatment Pain Rating 2-->hurts little bit  -KF     Pain Location - Side Left  -KF     Pain Location - Orientation generalized  -KF     Pain Location chest  rib  -KF     Pre/Posttreatment Pain Comment tolerated  -KF     Row Name 11/16/21 1031          Plan of Care Review    Plan of Care Reviewed With patient  -KF     Progress improving  -KF     Outcome Summary OT eval completed Pt presents with  deficits in balance, pain, and activity tolerance for ADL completion compared to baseline, completed 6ft functional mob with CGA at W demonstrates progress with balance, completed LBD with [place] socks and modA undergarment, recom 24/7assist with HHOT pending progress  -KF     Row Name 11/16/21 1031          Therapy Assessment/Plan (OT)    Rehab Potential (OT) good, to achieve stated therapy goals  -KF     Criteria for Skilled Therapeutic Interventions Met (OT) yes; meets criteria; skilled treatment is necessary  -KF     Therapy Frequency (OT) daily  -KF     Row Name 11/16/21 1031          Therapy Plan Review/Discharge Plan (OT)    Anticipated Discharge Disposition (OT) home with 24/7 care; home with assist; home with home health  -KF     Row Name 11/16/21 1031          Vital Signs    Pre Systolic BP Rehab 126  RN cleared VSS  -KF     Pre Treatment Diastolic BP 71  -KF     Pretreatment Heart Rate (beats/min) 96  -KF     Posttreatment Heart Rate (beats/min) 103  -KF     Pre SpO2 (%) 96  -KF     O2 Delivery Pre Treatment room air  -KF     Post SpO2 (%) 96  -KF     O2 Delivery Post Treatment room air  -KF     Pre Patient Position Supine  -KF     Intra Patient Position Standing  -KF     Post Patient Position Sitting  -KF     Rest Breaks  1  -KF     Row Name 11/16/21 1031          Positioning and Restraints    Pre-Treatment Position in bed  -KF     Post Treatment Position chair  -KF     In Chair notified nsg; reclined; sitting; call light within reach; encouraged to call for assist; exit alarm on; waffle cushion; LUE elevated; legs elevated  -KF           User Key  (r) = Recorded By, (t) = Taken By, (c) = Cosigned By    Initials Name Provider Type    Laura Crooks, OT Occupational Therapist               Outcome Measures     Row Name 11/16/21 1034          How much help from another is currently needed...    Putting on and taking off regular lower body clothing? 3  -KF     Bathing (including washing, rinsing, and  drying) 2  -KF     Toileting (which includes using toilet bed pan or urinal) 3  -KF     Putting on and taking off regular upper body clothing 3  -KF     Taking care of personal grooming (such as brushing teeth) 3  -KF     Eating meals 4  -KF     AM-PAC 6 Clicks Score (OT) 18  -KF     Row Name 11/16/21 1034          Functional Assessment    Outcome Measure Options AM-PAC 6 Clicks Daily Activity (OT)  -KF           User Key  (r) = Recorded By, (t) = Taken By, (c) = Cosigned By    Initials Name Provider Type    Laura Crooks OT Occupational Therapist                Occupational Therapy Education                 Title: PT OT SLP Therapies (In Progress)     Topic: Occupational Therapy (In Progress)     Point: ADL training (Done)     Description:   Instruct learner(s) on proper safety adaptation and remediation techniques during self care or transfers.   Instruct in proper use of assistive devices.              Learning Progress Summary           Patient Acceptance, E,TB,D, VU,DU,NR by  at 11/16/2021 1035                   Point: Home exercise program (Not Started)     Description:   Instruct learner(s) on appropriate technique for monitoring, assisting and/or progressing therapeutic exercises/activities.              Learner Progress:  Not documented in this visit.          Point: Precautions (Done)     Description:   Instruct learner(s) on prescribed precautions during self-care and functional transfers.              Learning Progress Summary           Patient Acceptance, E,TB,D, VU,DU,NR by  at 11/16/2021 1035                   Point: Body mechanics (Done)     Description:   Instruct learner(s) on proper positioning and spine alignment during self-care, functional mobility activities and/or exercises.              Learning Progress Summary           Patient Acceptance, E,TB,D, VU,DU,NR by  at 11/16/2021 1035                               User Key     Initials Effective Dates Name Provider Type Discipline     JOSEP 06/16/21 -  Laura Simpson OT Occupational Therapist OT              OT Recommendation and Plan  Planned Therapy Interventions (OT): activity tolerance training, patient/caregiver education/training, BADL retraining, adaptive equipment training, ROM/therapeutic exercise, strengthening exercise, occupation/activity based interventions, cognitive/visual perception retraining, transfer/mobility retraining, functional balance retraining  Therapy Frequency (OT): daily  Plan of Care Review  Plan of Care Reviewed With: patient  Progress: improving  Outcome Summary: OT eval completed Pt presents with deficits in balance, pain, and activity tolerance for ADL completion compared to baseline, completed 6ft functional mob with CGA at [facility] demonstrates progress with balance, completed LBD with Taylor socks and modA undergarment, recom 24/7assist with HHOT pending progress     Time Calculation:    Time Calculation- OT     Row Name 11/16/21 0835             Time Calculation- OT    OT Start Time 0835  -KF      OT Received On 11/16/21  -KF      OT Goal Re-Cert Due Date 11/26/21  -KF              Timed Charges    52176 - OT Therapeutic Activity Minutes 5  -KF      25893 - OT Self Care/Mgmt Minutes 5  -KF              Untimed Charges    OT Eval/Re-eval Minutes 46  -KF              Total Minutes    Timed Charges Total Minutes 10  -KF      Untimed Charges Total Minutes 46  -KF       Total Minutes 56  -KF            User Key  (r) = Recorded By, (t) = Taken By, (c) = Cosigned By    Initials Name Provider Type    KF Laura Simpson OT Occupational Therapist              Therapy Charges for Today     Code Description Service Date Service Provider Modifiers Qty    68515905884  OT THERAPEUTIC ACT EA 15 MIN 11/16/2021 Laura Simpson OT GO 1    [id number] HC OT EVAL MOD COMPLEXITY 4 11/16/2021 Laura Simpson OT GO 1               Laura Simpson OT  11/16/2021    Electronically signed by Laura Simpson OT at  11/16/21 1037

## 2021-11-16 NOTE — PROGRESS NOTES
[facility] Medicine Services  PROGRESS NOTE    Patient Name: Nallely Mccabe  : 1950  MRN: [id number]    Date of Admission: 2021  Primary Care Physician: Shay Rivera MD    Subjective   Subjective     CC:   hyponatremia     HPI:  Working with PT OT.  She continues to endorse significant pain certain movements    ROS:  Gen- No fevers, chills  CV- No chest pain, palpitations  Resp- No cough, dyspnea  GI- No N/V/D, abd pain     Objective   Objective     Vital Signs:   Temp:  [97.3 °F (36.3 °C)-98.8 °F (37.1 °C)] 98.8 °F (37.1 °C)  Heart Rate:  [] 79  Resp:  [18-19] 19  BP: (126-136)/() 130/79     Physical Exam:  Constitutional: No acute distress, awake, alert  HENT: NCAT, mucous membranes moist  Respiratory: Clear to auscultation bilaterally, respiratory effort normal   Cardiovascular: RRR, no murmurs, rubs, or gallops  Gastrointestinal: Positive bowel sounds, soft, nontender, nondistended  Musculoskeletal: No bilateral ankle edema  Psychiatric: Appropriate affect, cooperative  Neurologic: Oriented x 3 but seems to have some confusion, strength symmetric in all extremities, Cranial Nerves grossly intact to confrontation, speech clear  Skin: No rashes    Results Reviewed:  LAB RESULTS:      Lab 21  0939 21  0303 21  1739   WBC 5.79 8.62 12.07*   HEMOGLOBIN 11.1* 11.6* 11.9*   HEMATOCRIT 33.7* 32.8* 33.6*   PLATELETS 261 272 256   NEUTROS ABS  --  6.12 10.05*   IMMATURE GRANS (ABS)  --  0.05 0.06*   LYMPHS ABS  --  1.20 0.74   MONOS ABS  --  1.15* 1.19*   EOS ABS  --  0.06 0.01   MCV 88.9 82.4 83.8   LACTATE  --   --  1.6   PROTIME  --   --  12.5   APTT  --   --  35.9         Lab 21  0747 21  0002 11/15/21  1611 11/15/21  1226 11/15/21  0816 11/15/21  0406 11/15/21  0406 21  1230 21  0939 21  0702 21  0303 21  2044 21  1739   SODIUM 129* 128* 126* 126* 127*   < > 125*   < > 121*   < > 121*   < >  115*   POTASSIUM  --   --   --   --   --   --  4.5  --  3.9  --  3.9  --  4.4   CHLORIDE  --   --   --   --   --   --  93*  --  88*  --  86*  --  79*   CO2  --   --   --   --   --   --  23.0  --  20.0*  --  22.0  --  22.0   ANION GAP  --   --   --   --   --   --  9.0  --  13.0  --  13.0  --  14.0   BUN  --   --   --   --   --   --  10  --  12  --  15  --  16   CREATININE  --   --   --   --   --   --  1.20*  --  1.41*  --  1.74*  --  1.83*   GLUCOSE  --   --   --   --   --   --  114*  --  103*  --  114*  --  107*   CALCIUM  --   --   --   --   --   --  8.6  --  8.8  --  9.0  --  9.2   PHOSPHORUS  --   --   --   --   --   --   --   --  3.6  --   --   --   --     < > = values in this interval not displayed.         Lab 11/14/21  0939 11/12/21  1739   TOTAL PROTEIN  --  6.6   ALBUMIN 3.30* 4.00   GLOBULIN  --  2.6   ALT (SGPT)  --  16   AST (SGOT)  --  21   BILIRUBIN  --  0.4   ALK PHOS  --  113         Lab 11/12/21  1739   TROPONIN T <0.010   PROTIME 12.5   INR 0.96             Lab 11/14/21  0939   IRON 83   IRON SATURATION 34   TIBC 241*   TRANSFERRIN 162*         Brief Urine Lab Results  (Last result in the past 365 days)      Color   Clarity   Blood   Leuk Est   Nitrite   Protein   CREAT   Urine HCG        11/15/21 0034             39.1               Microbiology Results Abnormal     Procedure Component Value - Date/Time    Blood Culture - Blood, Hand, Right [[id number]]  (Normal) Collected: 11/12/21 2030    Lab Status: Preliminary result Specimen: Blood from Hand, Right Updated: 11/15/21 2116     Blood Culture No growth at 3 days    Blood Culture - Blood, Arm, Left [[id number]]  (Normal) Collected: 11/12/21 2044    Lab Status: Preliminary result Specimen: Blood from Arm, Left Updated: 11/15/21 2115     Blood Culture No growth at 3 days    Urine Culture - Urine, Urine, Catheter [[id number]] Collected: 11/12/21 1821    Lab Status: Final result Specimen: Urine, Catheter Updated: 11/14/21 1025     Urine Culture 50,000  CFU/mL Normal Urogenital Brisa    COVID PRE-OP / PRE-PROCEDURE SCREENING ORDER (NO ISOLATION) - Swab, Nasopharynx [[id number]]  (Normal) Collected: 11/12/21 1731    Lab Status: Final result Specimen: Swab from Nasopharynx Updated: 11/12/21 1754    Narrative:      The following orders were created for panel order COVID PRE-OP / PRE-PROCEDURE SCREENING ORDER (NO ISOLATION) - Swab, Nasopharynx.  Procedure                               Abnormality         Status                     ---------                               -----------         ------                     COVID-19, ABBOTT IN-HOUS...[[id number]]  Normal              Final result                 Please view results for these tests on the individual orders.    COVID-19, ABBOTT IN-HOUSE,NASAL Swab (NO TRANSPORT MEDIA) 2 HR TAT - Swab, Nasopharynx [[id number]]  (Normal) Collected: 11/12/21 1731    Lab Status: Final result Specimen: Swab from Nasopharynx Updated: 11/12/21 1754     COVID19 Presumptive Negative    Narrative:      Fact sheet for providers: https://www.fda.gov/media/819786/download     Fact sheet for patients: https://www.fda.gov/media/314025/download    Test performed by PCR.  If inconsistent with clinical signs and symptoms patient should be tested with different authorized molecular test.          No radiology results from the last 24 hrs        I have reviewed the medications:  Scheduled Meds:aspirin EC, 325 mg, Oral, Daily  buPROPion XL, 300 mg, Oral, Daily  carvedilol, 12.5 mg, Oral, BID With Meals  clopidogrel, 75 mg, Oral, Daily  fluticasone, 1 spray, Nasal, Daily  pantoprazole, 40 mg, Oral, Daily  pravastatin, 20 mg, Oral, Nightly  sodium chloride, 10 mL, Intravenous, Q12H      Continuous Infusions:   PRN Meds:.•  acetaminophen **OR** acetaminophen **OR** acetaminophen  •  ALPRAZolam  •  benzonatate  •  ondansetron  •  [COMPLETED] Insert peripheral IV **AND** sodium chloride  •  sodium chloride    Assessment/Plan   Assessment & Plan     Active  Hospital Problems    Diagnosis  POA   • **Acute hyponatremia [E87.1]  Yes   • Fall [W19.XXXA]  Yes   • Rib fractures [S22.49XA]  Yes   • Bacteriuria [R82.71]  Yes   • CAD (coronary artery disease) [I25.10]  Yes   • Hypertension [I10]  Yes   • Hyperlipidemia [E78.5]  Yes   • GERD (gastroesophageal reflux disease) [K21.9]  Yes   • Anxiety [F41.9]  Yes      Resolved Hospital Problems   No resolved problems to display.        Brief Hospital Course to date:  71 year old female presents to the ED accompanied by son due to fall  with confusion. Na 115 on presentation. Nephrology consulted.      Acute hyponatremia  -likely due to restarting losartan-HCTZ: Holding  - Initial Na 115; increased to 123 and D5 started; now at 128  -Nephrology following, once serum sodium is improved over 130 will space out spot sodium checks and likely be able to discharge.   -Fluids to end today     Fall   Rib fractures   -CT of head showed scalp hematoma overlying the right posterior parietal bone.  Chronic changes seen within the brain with no acute intracranial abnormality.  -fall precautions  -CT of lumbar spine, thoracic spine as mentioned above  -CT of chest with noted rib fractures   -Working with PT and OT     Elevated creatinine on CKD III   - issue with renal function for quite some time per patient   -likely secondary to recent bactrim; diuretics   -baseline creatinine 1.3-1.7  -hold nephrotoxic agents  -monitor      Bacteriuria  -on bactrim: hold  - UCx normal michael  - Rocephin x 3 days      generalized anxiety   -on xanax - continue home med   -BILLY verified      Essential hypertension   - hold losartan HCTZ   - Currently BP controlled      Hyperlipidemia  -continue statin      CAD  -continue DAPT     DVT prophylaxis:  Mechanical DVT prophylaxis orders are present.       AM-PAC 6 Clicks Score (PT): 16 (11/16/21 4390)    Disposition: I expect the patient to be discharged TBD    CODE STATUS:   Code Status and Medical Interventions:    Ordered at: 11/12/21 1959     Level Of Support Discussed With:    Patient     Code Status (Patient has no pulse and is not breathing):    CPR (Attempt to Resuscitate)     Medical Interventions (Patient has pulse or is breathing):    Full Support       Kaelyn Moody MD  11/16/21

## 2021-11-16 NOTE — PROGRESS NOTES
"   LOS: 4 days    Patient Care Team:  Shay Rivera MD as PCP - General  Shay Rivera MD as PCP - Family Medicine    Chief Complaint: Fall, hyponatremia  71-year-old history of depression, hyperlipidemia, essential hypertension, CAD, anxiety presented to ED with confusion and fall, has been on HCTZ, serum sodium 115, history of chronic kidney disease for the last 3 years, admitting creatinine 1.83 BUN 16.  Liver enzymes were normal.  Subjective     Interval History:   Sodium appropriate increase 128-129    Review of Systems:   No new complaints no nausea vomiting diarrhea constipation.    Objective     Vital Sign Min/Max for last 24 hours  Temp  Min: 97.3 °F (36.3 °C)  Max: 98.8 °F (37.1 °C)   BP  Min: 126/71  Max: 133/81   Pulse  Min: 86  Max: 98   Resp  Min: 18  Max: 19   SpO2  Min: 92 %  Max: 100 %   No data recorded   No data recorded     Flowsheet Rows      First Filed Value   Admission Height 165.1 cm (65\") Documented at 11/12/2021 1458   Admission Weight 88 kg (194 lb) Documented at 11/12/2021 1458          I/O this shift:  In: 240 [P.O.:240]  Out: -   I/O last 3 completed shifts:  In: 1076.3 [I.V.:1076.3]  Out: 3700 [Urine:3700]    Physical Exam:  General Appearance: Awake alert oriented x3 no obvious distress.  Eyes: PER, EOMI.  Oral mucosa moist.  Neck: Supple no JVD.  Lungs: Clear auscultation, no rales rhonchi's, equal chest movement, nonlabored.  Heart: No gallop, murmur, rub, RRR.  Abdomen: Soft, nontender, positive bowel sounds, no organomegaly.  Extremities: No edema, no cyanosis.  Neuro: No focal deficit, moving all extremities, alert oriented X 3  Skin no rash noted skin warm and dry   no suprapubic fullness or tenderness.  Psych mood and affect are normal appropriate.  WBC WBC   Date Value Ref Range Status   11/14/2021 5.79 3.40 - 10.80 10*3/mm3 Final      HGB Hemoglobin   Date Value Ref Range Status   11/14/2021 11.1 (L) 12.0 - 15.9 g/dL Final      HCT Hematocrit   Date Value Ref " Range Status   11/14/2021 33.7 (L) 34.0 - 46.6 % Final      Platlets No results found for: LABPLAT   MCV MCV   Date Value Ref Range Status   11/14/2021 88.9 79.0 - 97.0 fL Final          Sodium Sodium   Date Value Ref Range Status   11/16/2021 129 (L) 136 - 145 mmol/L Final   11/16/2021 128 (L) 136 - 145 mmol/L Final   11/15/2021 126 (L) 136 - 145 mmol/L Final   11/15/2021 126 (L) 136 - 145 mmol/L Final   11/15/2021 127 (L) 136 - 145 mmol/L Final   11/15/2021 125 (L) 136 - 145 mmol/L Final   11/14/2021 122 (L) 136 - 145 mmol/L Final   11/14/2021 121 (L) 136 - 145 mmol/L Final   11/14/2021 125 (L) 136 - 145 mmol/L Final   11/14/2021 123 (L) 136 - 145 mmol/L Final   11/14/2021 121 (L) 136 - 145 mmol/L Final   11/14/2021 121 (L) 136 - 145 mmol/L Final   11/13/2021 121 (L) 136 - 145 mmol/L Final   11/13/2021 120 (L) 136 - 145 mmol/L Final   11/13/2021 121 (L) 136 - 145 mmol/L Final      Potassium Potassium   Date Value Ref Range Status   11/15/2021 4.5 3.5 - 5.2 mmol/L Final   11/14/2021 3.9 3.5 - 5.2 mmol/L Final     Comment:     Slight hemolysis detected by analyzer. Results may be affected.      Chloride Chloride   Date Value Ref Range Status   11/15/2021 93 (L) 98 - 107 mmol/L Final   11/14/2021 88 (L) 98 - 107 mmol/L Final      CO2 CO2   Date Value Ref Range Status   11/15/2021 23.0 22.0 - 29.0 mmol/L Final   11/14/2021 20.0 (L) 22.0 - 29.0 mmol/L Final      BUN BUN   Date Value Ref Range Status   11/15/2021 10 8 - 23 mg/dL Final   11/14/2021 12 8 - 23 mg/dL Final      Creatinine Creatinine   Date Value Ref Range Status   11/15/2021 1.20 (H) 0.57 - 1.00 mg/dL Final   11/14/2021 1.41 (H) 0.57 - 1.00 mg/dL Final      Calcium Calcium   Date Value Ref Range Status   11/15/2021 8.6 8.6 - 10.5 mg/dL Final   11/14/2021 8.8 8.6 - 10.5 mg/dL Final      PO4 No results found for: CAPO4   Albumin Albumin   Date Value Ref Range Status   11/14/2021 3.30 (L) 3.50 - 5.20 g/dL Final      Magnesium No results found for: MG   Uric  Acid No results found for: URICACID        Results Review:     I reviewed the patient's new clinical results.    aspirin EC, 325 mg, Oral, Daily  buPROPion XL, 300 mg, Oral, Daily  carvedilol, 12.5 mg, Oral, BID With Meals  clopidogrel, 75 mg, Oral, Daily  fluticasone, 1 spray, Nasal, Daily  pantoprazole, 40 mg, Oral, Daily  pravastatin, 20 mg, Oral, Nightly  sodium chloride, 10 mL, Intravenous, Q12H      sodium chloride 0.9 % with KCl 20 mEq, 75 mL/hr, Last Rate: 75 mL/hr (11/15/21 1459)        Medication Review: Reviewed    Assessment/Plan       Acute hyponatremia    CAD (coronary artery disease)    Hypertension    Hyperlipidemia    GERD (gastroesophageal reflux disease)    Anxiety    Fall    Rib fractures    Bacteriuria     1.  Severe hyponatremia: Asymptomatic at this time.  Patient sodium 118, patient was on hydrochlorothiazide at home.  She has a history of hyponatremia in the past with hydrochlorothiazide which was stopped previously by the primary care physician, patient went on her own to restart it as her blood pressure was running high.  Urine sodium less than 20, urine osmolality 200.  She also had significant pain from fracture of rib, pain and nausea can increase ADH secretion, also heparin and action on aldosterone secretion as well as tubular action.  2.  CKD stage III: According to the patient patient has this problem for last 3 years.  3.  History of hypertension.  4.  Anemia.    Recommendations:  Decrease IV fluids 75 mL/h.  Last urine sodium less than 20, will give IV fluids monitor sodium closely.  Water restriction less than 1200 mL/day.  Hold HCTZ.  Renal function has improved from yesterday.  Checks serial labs.  Avoid nephrotoxic medications.  Check labs in the morning hopefully he will be able to go home tomorrow or even today if the serum sodium is improved over 130  Saniya Landa MD  11/16/21  13:13 EST

## 2021-11-16 NOTE — PLAN OF CARE
Goal Outcome Evaluation:       Pt complains of intermittent left back pain. Alert and oriented,  intermittent reorientation to current task while in room. Around 10am, pt had an episode of HR 150s-170s. Vagal maneuver used and HR went to 80s. VSS, RA. Will continue to monitor.

## 2021-11-16 NOTE — THERAPY EVALUATION
Patient Name: Nallely Mccabe  : 1950    MRN: [id number]                              Today's Date: 2021       Admit Date: 2021    Visit Dx:     ICD-10-CM ICD-9-CM   1. Acute hyponatremia  E87.1 276.1   2. Frequent falls  R29.6 V15.88   3. Acute renal failure, unspecified acute renal failure type (HCC)  N17.9 584.9   4. Closed fracture of multiple ribs of left side, initial encounter  S22.42XA 807.09   5. Pleural effusion on left  J90 511.9   6. Acute urinary tract infection  N39.0 599.0     Patient Active Problem List   Diagnosis   • CAD (coronary artery disease)   • Hypertension   • Hyperlipidemia   • GERD (gastroesophageal reflux disease)   • Anxiety   • Depression   • Obesity (BMI 30-39.9)   • Metabolic encephalopathy   • Acute hyponatremia   • Fall   • Rib fractures   • Bacteriuria     Past Medical History:   Diagnosis Date   • Anxiety    • CAD (coronary artery disease)    • Depression    • GERD (gastroesophageal reflux disease)    • Hyperlipidemia    • Hypertension    • Obesity (BMI 30.0-34.9)    • Pyelonephritis    • Pyelonephritis, acute 2018    Right sided   • Sepsis (HCC)    • Sepsis (HCC) 2018   • UTI (urinary tract infection)      Past Surgical History:   Procedure Laterality Date   • CORONARY ANGIOPLASTY  10/18/2011   • EYE SURGERY     • ORIF HUMERUS FRACTURE     • SINUS SURGERY        General Information     Row Name 21 1026          OT Time and Intention    Document Type evaluation  -KF     Mode of Treatment occupational therapy  -KF     Row Name 21 1026          General Information    Patient Profile Reviewed yes  -KF     Prior Level of Function independent:; all household mobility; bed mobility; ADL's; community mobility; home management  -KF     Existing Precautions/Restrictions fall  -KF     Barriers to Rehab previous functional deficit  -KF     Row Name 21 1026          Occupational Profile    Environmental Supports and Barriers (Occupational Profile)  has FWW at home available; tub shower; BSC available  -     Row Name 11/16/21 1026          Living Environment    Lives With alone  states plans to have dtr or friend assist at d/c at home  -     Row Name 11/16/21 1026          Home Main Entrance    Number of Stairs, Main Entrance two  -     Row Name 11/16/21 1026          Stairs Within Home, Primary    Number of Stairs, Within Home, Primary none  -     Row Name 11/16/21 1026          Cognition    Orientation Status (Cognition) oriented x 4  -     Row Name 11/16/21 1026          Safety Issues, Functional Mobility    Safety Issues Affecting Function (Mobility) insight into deficits/self-awareness; safety precaution awareness  -     Impairments Affecting Function (Mobility) pain; balance; endurance/activity tolerance  -KF           User Key  (r) = Recorded By, (t) = Taken By, (c) = Cosigned By    Initials Name Provider Type    KF Laura Simpson, OT Occupational Therapist                 Mobility/ADL's     Row Name 11/16/21 1027          Bed Mobility    Bed Mobility scooting/bridging; supine-sit  -KF     Scooting/Bridging [place] (Bed Mobility) standby assist  -KF     Supine-Sit [place] (Bed Mobility) standby assist  -KF     Bed Mobility, Safety Issues decreased use of arms for pushing/pulling  -KF     Assistive Device (Bed Mobility) bed rails; head of bed elevated  -     Comment (Bed Mobility) improved today with extended time  -     Row Name 11/16/21 1027          Transfers    Transfers sit-stand transfer; bed-chair transfer  -KF     Comment (Transfers) with extended time able to completed STS x2 with CGA at FWW with good safety, able to verbalize safe sequence with FWW  -KF     Bed-Chair [place] (Transfers) contact guard; verbal cues; 1 person assist  -     Assistive Device (Bed-Chair Transfers) walker, front-wheeled  -KF     Sit-Stand [place] (Transfers) contact guard; 1 person assist; verbal cues  -     Row Name 11/16/21  1027          Sit-Stand Transfer    Assistive Device (Sit-Stand Transfers) walker, front-wheeled  -KF     Row Name 11/16/21 1027          Functional Mobility    Functional Mobility- Ind. Level contact guard assist; 1 person; verbal cues required  -KF     Functional Mobility-Distance (Feet) 8  -KF     Functional Mobility- Safety Issues weight-shifting ability decreased; step length decreased  -KF     Functional Mobility- Comment limited by pain  -KF     Row Name 11/16/21 1027          Activities of Daily Living    BADL Assessment/Intervention upper body dressing; lower body dressing  -KF     Row Name 11/16/21 1027          Upper Body Dressing Assessment/Training    [place] Level (Upper Body Dressing) don; front opening garment; set up  -KF     Position (Upper Body Dressing) edge of bed sitting  -KF     Row Name 11/16/21 1027          Lower Body Dressing Assessment/Training    [place] Level (Lower Body Dressing) don; socks; minimum assist (75% patient effort); pants/bottoms  -KF     Position (Lower Body Dressing) edge of bed sitting; supported standing  -KF     Comment (Lower Body Dressing) states has reacher available to assist and also discussed compensatory strategies in supine with setup if pain limiting  -KF           User Key  (r) = Recorded By, (t) = Taken By, (c) = Cosigned By    Initials Name Provider Type    KF Laura Simpson, OT Occupational Therapist               Obj/Interventions     Row Name 11/16/21 1030          Sensory Assessment (Somatosensory)    Sensory Assessment (Somatosensory) UE sensation intact  -KF     Row Name 11/16/21 1030          Vision Assessment/Intervention    Visual Impairment/Limitations WFL  -KF     Row Name 11/16/21 1030          Range of Motion Comprehensive    General Range of Motion bilateral upper extremity ROM WNL  -KF     Row Name 11/16/21 1030          Strength Comprehensive (MMT)    General Manual Muscle Testing (MMT) Assessment upper extremity strength  deficits identified  -KF     Comment, General Manual Muscle Testing (MMT) Assessment deferred 2/2 pain, demonstrates WFL for ADL completion; B  4/5  -KF     Row Name 11/16/21 1030          Motor Skills    Motor Skills coordination  -KF     Coordination WFL; bilateral; upper extremity  -KF     Row Name 11/16/21 1030          Balance    Balance Assessment sitting static balance; sitting dynamic balance; standing dynamic balance; standing static balance  -KF     Static Sitting Balance WNL; unsupported  -KF     Dynamic Sitting Balance WNL; unsupported  -KF     Static Standing Balance WFL; supported  -KF     Dynamic Standing Balance mild impairment; supported  -KF     Balance Interventions sit to stand; standing; occupation based/functional task; UE activity with balance activity  -KF     Comment, Balance good balance steps to chair overall CGA  -KF           User Key  (r) = Recorded By, (t) = Taken By, (c) = Cosigned By    Initials Name Provider Type    KF Laura Simpson, OT Occupational Therapist               Goals/Plan     Row Name 11/16/21 1034          Bed Mobility Goal 1 (OT)    Activity/Assistive Device (Bed Mobility Goal 1, OT) sidelying to sit/sit to sidelying  -KF     [place] Level/Cues Needed (Bed Mobility Goal 1, OT) supervision required  -KF     Time Frame (Bed Mobility Goal 1, OT) long term goal (LTG); 10 days  -KF     Progress/Outcomes (Bed Mobility Goal 1, OT) goal ongoing  -KF     Row Name 11/16/21 1034          Transfer Goal 1 (OT)    Activity/Assistive Device (Transfer Goal 1, OT) toilet; walker, rolling  -KF     [place] Level/Cues Needed (Transfer Goal 1, OT) contact guard assist  -KF     Time Frame (Transfer Goal 1, OT) long term goal (LTG); 10 days  -KF     Progress/Outcome (Transfer Goal 1, OT) goal ongoing  -KF     Row Name 11/16/21 1034          Toileting Goal 1 (OT)    Activity/Device (Toileting Goal 1, OT) adjust/manage clothing; perform perineal hygiene; commode  -KF      [place] Level/Cues Needed (Toileting Goal 1, OT) set-up required  -KF     Time Frame (Toileting Goal 1, OT) long term goal (LTG); 10 days  -KF     Progress/Outcome (Toileting Goal 1, OT) goal ongoing  -KF     Row Name 11/16/21 1034          Therapy Assessment/Plan (OT)    Planned Therapy Interventions (OT) activity tolerance training; patient/caregiver education/training; BADL retraining; adaptive equipment training; ROM/therapeutic exercise; strengthening exercise; occupation/activity based interventions; cognitive/visual perception retraining; transfer/mobility retraining; functional balance retraining  -KF           User Key  (r) = Recorded By, (t) = Taken By, (c) = Cosigned By    Initials Name Provider Type    KF Laura Simpson, OT Occupational Therapist               Clinical Impression     Row Name 11/16/21 1031          Pain Assessment    Additional Documentation Pain Scale: FACES Pre/Post-Treatment (Group)  -     Row Name 11/16/21 1031          Pain Scale: FACES Pre/Post-Treatment    Pain: FACES Scale, Pretreatment 2-->hurts little bit  -KF     Posttreatment Pain Rating 2-->hurts little bit  -KF     Pain Location - Side Left  -KF     Pain Location - Orientation generalized  -KF     Pain Location chest  rib  -KF     Pre/Posttreatment Pain Comment tolerated  -KF     Row Name 11/16/21 1031          Plan of Care Review    Plan of Care Reviewed With patient  -KF     Progress improving  -KF     Outcome Summary OT eval completed Pt presents with deficits in balance, pain, and activity tolerance for ADL completion compared to baseline, completed 6ft functional mob with CGA at FWW demonstrates progress with balance, completed LBD with Taylor socks and modA undergarment, recom 24/7assist with HHOT pending progress  -KF     Row Name 11/16/21 1031          Therapy Assessment/Plan (OT)    Rehab Potential (OT) good, to achieve stated therapy goals  -KF     Criteria for Skilled Therapeutic Interventions Met (OT)  yes; meets criteria; skilled treatment is necessary  -KF     Therapy Frequency (OT) daily  -KF     Row Name 11/16/21 1031          Therapy Plan Review/Discharge Plan (OT)    Anticipated Discharge Disposition (OT) home with 24/7 care; home with assist; home with home health  -KF     Row Name 11/16/21 1031          Vital Signs    Pre Systolic BP Rehab 126  RN cleared VSS  -KF     Pre Treatment Diastolic BP 71  -KF     Pretreatment Heart Rate (beats/min) 96  -KF     Posttreatment Heart Rate (beats/min) 103  -KF     Pre SpO2 (%) 96  -KF     O2 Delivery Pre Treatment room air  -KF     Post SpO2 (%) 96  -KF     O2 Delivery Post Treatment room air  -KF     Pre Patient Position Supine  -KF     Intra Patient Position Standing  -KF     Post Patient Position Sitting  -KF     Rest Breaks  1  -KF     Row Name 11/16/21 1031          Positioning and Restraints    Pre-Treatment Position in bed  -KF     Post Treatment Position chair  -KF     In Chair notified nsg; reclined; sitting; call light within reach; encouraged to call for assist; exit alarm on; waffle cushion; LUE elevated; legs elevated  -KF           User Key  (r) = Recorded By, (t) = Taken By, (c) = Cosigned By    Initials Name Provider Type    KF Laura Simpson, OT Occupational Therapist               Outcome Measures     Row Name 11/16/21 1034          How much help from another is currently needed...    Putting on and taking off regular lower body clothing? 3  -KF     Bathing (including washing, rinsing, and drying) 2  -KF     Toileting (which includes using toilet bed pan or urinal) 3  -KF     Putting on and taking off regular upper body clothing 3  -KF     Taking care of personal grooming (such as brushing teeth) 3  -KF     Eating meals 4  -KF     AM-PAC 6 Clicks Score (OT) 18  -KF     Row Name 11/16/21 1034          Functional Assessment    Outcome Measure Options AM-PAC 6 Clicks Daily Activity (OT)  -KF           User Key  (r) = Recorded By, (t) = Taken By, (c)  = Cosigned By    Initials Name Provider Type     Laura Simpson OT Occupational Therapist                Occupational Therapy Education                 Title: PT OT SLP Therapies (In Progress)     Topic: Occupational Therapy (In Progress)     Point: ADL training (Done)     Description:   Instruct learner(s) on proper safety adaptation and remediation techniques during self care or transfers.   Instruct in proper use of assistive devices.              Learning Progress Summary           Patient Acceptance, E,TB,D, VU,DU,NR by  at 11/16/2021 1035                   Point: Home exercise program (Not Started)     Description:   Instruct learner(s) on appropriate technique for monitoring, assisting and/or progressing therapeutic exercises/activities.              Learner Progress:  Not documented in this visit.          Point: Precautions (Done)     Description:   Instruct learner(s) on prescribed precautions during self-care and functional transfers.              Learning Progress Summary           Patient Acceptance, E,TB,D, VU,DU,NR by  at 11/16/2021 1035                   Point: Body mechanics (Done)     Description:   Instruct learner(s) on proper positioning and spine alignment during self-care, functional mobility activities and/or exercises.              Learning Progress Summary           Patient Acceptance, E,TB,D, VU,DU,NR by  at 11/16/2021 1035                               User Key     Initials Effective Dates Name Provider Type Discipline     06/16/21 -  Laura Simpson OT Occupational Therapist OT              OT Recommendation and Plan  Planned Therapy Interventions (OT): activity tolerance training, patient/caregiver education/training, BADL retraining, adaptive equipment training, ROM/therapeutic exercise, strengthening exercise, occupation/activity based interventions, cognitive/visual perception retraining, transfer/mobility retraining, functional balance retraining  Therapy  Frequency (OT): daily  Plan of Care Review  Plan of Care Reviewed With: patient  Progress: improving  Outcome Summary: OT eval completed Pt presents with deficits in balance, pain, and activity tolerance for ADL completion compared to baseline, completed 6ft functional mob with CGA at [facility] demonstrates progress with balance, completed LBD with [place] socks and modA undergarment, recom 24/7assist with HHOT pending progress     Time Calculation:    Time Calculation- OT     Row Name 11/16/21 0835             Time Calculation- OT    OT Start Time 0835  -KF      OT Received On 11/16/21  -KF      OT Goal Re-Cert Due Date 11/26/21  -KF              Timed Charges    31540 - OT Therapeutic Activity Minutes 5  -KF      76639 - OT Self Care/Mgmt Minutes 5  -KF              Untimed Charges    OT Eval/Re-eval Minutes 46  -KF              Total Minutes    Timed Charges Total Minutes 10  -KF      Untimed Charges Total Minutes 46  -KF       Total Minutes 56  -KF            User Key  (r) = Recorded By, (t) = Taken By, (c) = Cosigned By    Initials Name Provider Type    KF Laura Simpson OT Occupational Therapist              Therapy Charges for Today     Code Description Service Date Service Provider Modifiers Qty    [id number]  OT THERAPEUTIC ACT EA 15 MIN 11/16/2021 Laura Simpson OT GO 1    [id number] HC OT EVAL MOD COMPLEXITY 4 11/16/2021 Laura Simpson OT GO 1               Laura Simpson OT  11/16/2021

## 2021-11-16 NOTE — PLAN OF CARE
Goal Outcome Evaluation:  Plan of Care Reviewed With: patient        Progress: improving  Outcome Summary: OT eval completed Pt presents with deficits in balance, pain, and activity tolerance for ADL completion compared to baseline, completed 6ft functional mob with CGA at [facility] demonstrates progress with balance, completed LBD with [place] socks and modA undergarment, recom 24/7assist with HHOT pending progress

## 2021-11-17 LAB
ALBUMIN SERPL-MCNC: 3.3 G/DL (ref 3.5–5.2)
ANION GAP SERPL CALCULATED.3IONS-SCNC: 10 MMOL/L (ref 5–15)
BACTERIA SPEC AEROBE CULT: NORMAL
BACTERIA SPEC AEROBE CULT: NORMAL
BUN SERPL-MCNC: 7 MG/DL (ref 8–23)
BUN/CREAT SERPL: 7 (ref 7–25)
CALCIUM SPEC-SCNC: 8.9 MG/DL (ref 8.6–10.5)
CHLORIDE SERPL-SCNC: 98 MMOL/L (ref 98–107)
CO2 SERPL-SCNC: 22 MMOL/L (ref 22–29)
CREAT SERPL-MCNC: 1 MG/DL (ref 0.57–1)
GFR SERPL CREATININE-BSD FRML MDRD: 54 ML/MIN/1.73
GLUCOSE SERPL-MCNC: 90 MG/DL (ref 65–99)
PHOSPHATE SERPL-MCNC: 3.4 MG/DL (ref 2.5–4.5)
POTASSIUM SERPL-SCNC: 4.4 MMOL/L (ref 3.5–5.2)
QT INTERVAL: 250 MS
QTC INTERVAL: 405 MS
SODIUM SERPL-SCNC: 128 MMOL/L (ref 136–145)
SODIUM SERPL-SCNC: 129 MMOL/L (ref 136–145)
SODIUM SERPL-SCNC: 130 MMOL/L (ref 136–145)

## 2021-11-17 PROCEDURE — 84295 ASSAY OF SERUM SODIUM: CPT | Performed by: INTERNAL MEDICINE

## 2021-11-17 PROCEDURE — 80069 RENAL FUNCTION PANEL: CPT | Performed by: INTERNAL MEDICINE

## 2021-11-17 PROCEDURE — 97116 GAIT TRAINING THERAPY: CPT

## 2021-11-17 PROCEDURE — 97110 THERAPEUTIC EXERCISES: CPT

## 2021-11-17 PROCEDURE — 99232 SBSQ HOSP IP/OBS MODERATE 35: CPT | Performed by: STUDENT IN AN ORGANIZED HEALTH CARE EDUCATION/TRAINING PROGRAM

## 2021-11-17 RX ADMIN — BUPROPION HYDROCHLORIDE 300 MG: 150 TABLET, FILM COATED, EXTENDED RELEASE ORAL at 08:05

## 2021-11-17 RX ADMIN — CARVEDILOL 12.5 MG: 12.5 TABLET, FILM COATED ORAL at 08:05

## 2021-11-17 RX ADMIN — ASPIRIN 325 MG: 325 TABLET, COATED ORAL at 08:05

## 2021-11-17 RX ADMIN — SODIUM CHLORIDE, PRESERVATIVE FREE 10 ML: 5 INJECTION INTRAVENOUS at 09:55

## 2021-11-17 RX ADMIN — ACETAMINOPHEN 650 MG: 325 TABLET, FILM COATED ORAL at 11:30

## 2021-11-17 RX ADMIN — ACETAMINOPHEN 650 MG: 325 TABLET, FILM COATED ORAL at 03:30

## 2021-11-17 RX ADMIN — FLUTICASONE PROPIONATE 1 SPRAY: 50 SPRAY, METERED NASAL at 08:05

## 2021-11-17 RX ADMIN — CLOPIDOGREL BISULFATE 75 MG: 75 TABLET ORAL at 08:05

## 2021-11-17 RX ADMIN — PRAVASTATIN SODIUM 20 MG: 20 TABLET ORAL at 20:55

## 2021-11-17 RX ADMIN — CARVEDILOL 12.5 MG: 12.5 TABLET, FILM COATED ORAL at 17:57

## 2021-11-17 RX ADMIN — SODIUM CHLORIDE, PRESERVATIVE FREE 10 ML: 5 INJECTION INTRAVENOUS at 20:55

## 2021-11-17 RX ADMIN — PANTOPRAZOLE SODIUM 40 MG: 40 TABLET, DELAYED RELEASE ORAL at 08:05

## 2021-11-17 RX ADMIN — ACETAMINOPHEN 650 MG: 325 TABLET, FILM COATED ORAL at 20:55

## 2021-11-17 NOTE — CASE MANAGEMENT/SOCIAL WORK
Continued Stay Note  [facility]     Patient Name: Nallely Mccabe  MRN: [id number]  Today's Date: 11/17/2021    Admit Date: 11/12/2021     Discharge Plan     Row Name 11/17/21 1400       Plan    Plan Home with home health    Plan Comments Spoke with [facility].  They have received referral for home health, and are awaiting insurance precertification.  Patient might be discharged tomorrow.  CM will continue to follow.    Final Discharge Disposition Code 06 - home with home health care               Discharge Codes    No documentation.               Expected Discharge Date and Time     Expected Discharge Date Expected Discharge Time    Nov 19, 2021             Bell Dick RN

## 2021-11-17 NOTE — PLAN OF CARE
Goal Outcome Evaluation:         Pt complains of intermittent back pain and headaches. Tylenol administered. D/C tomorrow. DEVIKA GUIDRY.

## 2021-11-17 NOTE — PROGRESS NOTES
"   LOS: 5 days    Patient Care Team:  Shay Rivera MD as PCP - General  Shay Rivera MD as PCP - Family Medicine    Chief Complaint: Fall, hyponatremia  71-year-old history of depression, hyperlipidemia, essential hypertension, CAD, anxiety presented to ED with confusion and fall, has been on HCTZ, serum sodium 115, history of chronic kidney disease for the last 3 years, admitting creatinine 1.83 BUN 16.  Liver enzymes were normal.  Subjective     Interval History:   Sodium appropriate increase 130    Review of Systems:   No new complaints no nausea vomiting diarrhea constipation.    Objective     Vital Sign Min/Max for last 24 hours  Temp  Min: 97.5 °F (36.4 °C)  Max: 98.4 °F (36.9 °C)   BP  Min: 123/79  Max: 146/85   Pulse  Min: 68  Max: 88   Resp  Min: 18  Max: 18   SpO2  Min: 91 %  Max: 100 %   No data recorded   No data recorded     Flowsheet Rows      First Filed Value   Admission Height 165.1 cm (65\") Documented at 11/12/2021 1458   Admission Weight 88 kg (194 lb) Documented at 11/12/2021 1458          I/O this shift:  In: 540 [P.O.:540]  Out: -   I/O last 3 completed shifts:  In: 1877.5 [P.O.:600; I.V.:1277.5]  Out: 2850 [Urine:2850]    Physical Exam:  General Appearance: Awake alert oriented x3 no obvious distress.  Eyes: PER, EOMI.  Oral mucosa moist.  Neck: Supple no JVD.  Lungs: Clear auscultation, no rales rhonchi's, equal chest movement, nonlabored.  Heart: No gallop, murmur, rub, RRR.  Abdomen: Soft, nontender, positive bowel sounds, no organomegaly.  Extremities: No edema, no cyanosis.  Neuro: No focal deficit, moving all extremities, alert oriented X 3  Skin no rash noted skin warm and dry   no suprapubic fullness or tenderness.  Psych mood and affect are normal appropriate.  WBC No results found for: WBC   HGB No results found for: HGB   HCT No results found for: HCT   Platlets No results found for: LABPLAT   MCV No results found for: MCV       Sodium Sodium   Date Value Ref Range " Status   11/17/2021 130 (L) 136 - 145 mmol/L Final   11/16/2021 129 (L) 136 - 145 mmol/L Final   11/16/2021 130 (L) 136 - 145 mmol/L Final   11/16/2021 129 (L) 136 - 145 mmol/L Final   11/16/2021 128 (L) 136 - 145 mmol/L Final   11/15/2021 126 (L) 136 - 145 mmol/L Final   11/15/2021 126 (L) 136 - 145 mmol/L Final   11/15/2021 127 (L) 136 - 145 mmol/L Final   11/15/2021 125 (L) 136 - 145 mmol/L Final   11/14/2021 122 (L) 136 - 145 mmol/L Final   11/14/2021 121 (L) 136 - 145 mmol/L Final   11/14/2021 125 (L) 136 - 145 mmol/L Final      Potassium Potassium   Date Value Ref Range Status   11/17/2021 4.4 3.5 - 5.2 mmol/L Final     Comment:     Slight hemolysis detected by analyzer. Results may be affected.   11/15/2021 4.5 3.5 - 5.2 mmol/L Final      Chloride Chloride   Date Value Ref Range Status   11/17/2021 98 98 - 107 mmol/L Final   11/15/2021 93 (L) 98 - 107 mmol/L Final      CO2 CO2   Date Value Ref Range Status   11/17/2021 22.0 22.0 - 29.0 mmol/L Final   11/15/2021 23.0 22.0 - 29.0 mmol/L Final      BUN BUN   Date Value Ref Range Status   11/17/2021 7 (L) 8 - 23 mg/dL Final   11/15/2021 10 8 - 23 mg/dL Final      Creatinine Creatinine   Date Value Ref Range Status   11/17/2021 1.00 0.57 - 1.00 mg/dL Final   11/15/2021 1.20 (H) 0.57 - 1.00 mg/dL Final      Calcium Calcium   Date Value Ref Range Status   11/17/2021 8.9 8.6 - 10.5 mg/dL Final   11/15/2021 8.6 8.6 - 10.5 mg/dL Final      PO4 No results found for: CAPO4   Albumin Albumin   Date Value Ref Range Status   11/17/2021 3.30 (L) 3.50 - 5.20 g/dL Final      Magnesium No results found for: MG   Uric Acid No results found for: URICACID        Results Review:     I reviewed the patient's new clinical results.    aspirin EC, 325 mg, Oral, Daily  buPROPion XL, 300 mg, Oral, Daily  carvedilol, 12.5 mg, Oral, BID With Meals  clopidogrel, 75 mg, Oral, Daily  fluticasone, 1 spray, Nasal, Daily  pantoprazole, 40 mg, Oral, Daily  pravastatin, 20 mg, Oral,  Nightly  sodium chloride, 10 mL, Intravenous, Q12H           Medication Review: Reviewed    Assessment/Plan       Acute hyponatremia    CAD (coronary artery disease)    Hypertension    Hyperlipidemia    GERD (gastroesophageal reflux disease)    Anxiety    Fall    Rib fractures    Bacteriuria     1.  Severe hyponatremia: Asymptomatic at this time.  Patient sodium 118, patient was on hydrochlorothiazide at home.  She has a history of hyponatremia in the past with hydrochlorothiazide which was stopped previously by the primary care physician, patient went on her own to restart it as her blood pressure was running high.  Urine sodium less than 20, urine osmolality 200.  She also had significant pain from fracture of rib, pain and nausea can increase ADH secretion, also heparin and action on aldosterone secretion as well as tubular action.  2.  CKD stage III: According to the patient patient has this problem for last 3 years.  3.  History of hypertension.  4.  Anemia.    Recommendations:  DC IV fluid  DC home.  Fluid restriction 1500 mL/day  Hold HCTZ.  Renal function has improved from yesterday.  Return to office in 2 weeks time with sodium level  Saniya Landa MD  11/17/21  13:23 EST

## 2021-11-17 NOTE — PLAN OF CARE
Goal Outcome Evaluation:  Plan of Care Reviewed With: patient        Progress: improving  Outcome Summary: patient ambulated 200 feet with CGA x1 and rolling walker for support, tech followed with chair for safety. Patient took 1 seated rest break due to weakness. Patient with complaints of L rib pain during mobility 3/10. Completed B LE exercises without complaints.

## 2021-11-17 NOTE — THERAPY TREATMENT NOTE
Patient Name: Nallely Mccabe  : 1950    MRN: [id number]                              Today's Date: 2021       Admit Date: 2021    Visit Dx:     ICD-10-CM ICD-9-CM   1. Acute hyponatremia  E87.1 276.1   2. Frequent falls  R29.6 V15.88   3. Acute renal failure, unspecified acute renal failure type (HCC)  N17.9 584.9   4. Closed fracture of multiple ribs of left side, initial encounter  S22.42XA 807.09   5. Pleural effusion on left  J90 511.9   6. Acute urinary tract infection  N39.0 599.0     Patient Active Problem List   Diagnosis   • CAD (coronary artery disease)   • Hypertension   • Hyperlipidemia   • GERD (gastroesophageal reflux disease)   • Anxiety   • Depression   • Obesity (BMI 30-39.9)   • Metabolic encephalopathy   • Acute hyponatremia   • Fall   • Rib fractures   • Bacteriuria     Past Medical History:   Diagnosis Date   • Anxiety    • CAD (coronary artery disease)    • Depression    • GERD (gastroesophageal reflux disease)    • Hyperlipidemia    • Hypertension    • Obesity (BMI 30.0-34.9)    • Pyelonephritis    • Pyelonephritis, acute 2018    Right sided   • Sepsis (HCC)    • Sepsis (HCC) 2018   • UTI (urinary tract infection)      Past Surgical History:   Procedure Laterality Date   • CORONARY ANGIOPLASTY  10/18/2011   • EYE SURGERY     • ORIF HUMERUS FRACTURE     • SINUS SURGERY        General Information     Row Name 21 1000          Physical Therapy Time and Intention    Document Type therapy note (daily note)  -AS     Mode of Treatment physical therapy  -AS     Row Name 21 1000          General Information    Patient Profile Reviewed yes  -AS     Existing Precautions/Restrictions fall  -AS     Barriers to Rehab previous functional deficit  -AS     Row Name 21 1000          Cognition    Orientation Status (Cognition) oriented x 4  -AS     Row Name 21 1000          Safety Issues, Functional Mobility    Safety Issues Affecting Function (Mobility)  insight into deficits/self-awareness; positioning of assistive device  -AS     Impairments Affecting Function (Mobility) pain; balance; endurance/activity tolerance  -AS           User Key  (r) = Recorded By, (t) = Taken By, (c) = Cosigned By    Initials Name Provider Type    AS Lydia Alvarenga PTA Physical Therapy Assistant               Mobility     Row Name 11/17/21 1002          Bed Mobility    Comment (Bed Mobility) sitting [facility] pre/post tx  -AS     Row Name 11/17/21 1002          Transfers    Comment (Transfers) verbal cues for hand placement, slow to complete transfer due to rib pain.  -AS     Row Name 11/17/21 1002          Bed-Chair Transfer    Bed-Chair Williams (Transfers) verbal cues; contact guard; 1 person assist  -AS     Assistive Device (Bed-Chair Transfers) walker, front-wheeled  -AS     Row Name 11/17/21 1002          Sit-Stand Transfer    Sit-Stand Williams (Transfers) verbal cues; contact guard; 1 person assist  -AS     Assistive Device (Sit-Stand Transfers) walker, front-wheeled  -AS     Row Name 11/17/21 1002          Gait/Stairs (Locomotion)    Williams Level (Gait) verbal cues; contact guard; 1 person assist; 1 person to manage equipment  -AS     Assistive Device (Gait) walker, front-wheeled  -AS     Distance in Feet (Gait) 200  patient took 1 seated rest break at 100 feet  -AS     Deviations/Abnormal Patterns (Gait) bilateral deviations; cierra decreased; gait speed decreased  -AS     Bilateral Gait Deviations forward flexed posture  -AS     Comment (Gait/Stairs) patient ambulated 200 feet with CGA x1 and rolling walker for support, tech followed with chair for safety. Patient took 1 seated rest break due to weakness. Patient with complaints of L rib pain during mobility 3/10.  -AS           User Key  (r) = Recorded By, (t) = Taken By, (c) = Cosigned By    Initials Name Provider Type    AS Lydia Alvarenga PTA Physical Therapy Assistant               Obj/Interventions      Row Name 11/17/21 1006          Motor Skills    Therapeutic Exercise knee; ankle  -AS     [facility] Name 11/17/21 1006          Knee (Therapeutic Exercise)    Knee (Therapeutic Exercise) strengthening exercise  -AS     Knee Strengthening (Therapeutic Exercise) bilateral; marching while seated; LAQ (long arc quad); sitting; 10 repetitions  -AS     [facility] Name 11/17/21 1006          Ankle (Therapeutic Exercise)    Ankle (Therapeutic Exercise) AROM (active range of motion)  -AS     Ankle AROM (Therapeutic Exercise) bilateral; dorsiflexion; plantarflexion; sitting; 10 repetitions  -AS           User Key  (r) = Recorded By, (t) = Taken By, (c) = Cosigned By    Initials Name Provider Type    AS Lydia Alvarenga PTA Physical Therapy Assistant               Goals/Plan    No documentation.                Clinical Impression     Row Name 11/17/21 1007          Pain    Additional Documentation Pain Scale: Numbers Pre/Post-Treatment (Group)  -AS     Row Name 11/17/21 1007          Pain Scale: Numbers Pre/Post-Treatment    Pretreatment Pain Rating 3/10  -AS     Posttreatment Pain Rating 3/10  -AS     Pain Location - Side Left  -AS     Pain Location flank  -AS     Pain Intervention(s) Repositioned; Ambulation/increased activity  -AS     Row Name 11/17/21 1007          Plan of Care Review    Plan of Care Reviewed With patient  -AS     Progress improving  -AS     Outcome Summary patient ambulated 200 feet with CGA x1 and rolling walker for support, tech followed with chair for safety. Patient took 1 seated rest break due to weakness. Patient with complaints of L rib pain during mobility 3/10. Completed B LE exercises without complaints.  -AS     [facility] Name 11/17/21 1007          Positioning and Restraints    Pre-Treatment Position sitting in chair/recliner  -AS     Post Treatment Position chair  -AS     In Chair reclined; call light within reach; encouraged to call for assist; exit alarm on; waffle cushion; legs elevated  -AS            User Key  (r) = Recorded By, (t) = Taken By, (c) = Cosigned By    Initials Name Provider Type    AS Lydia Alvarenga PTA Physical Therapy Assistant               Outcome Measures     Row Name 11/17/21 1008 11/17/21 0800       How much help from another person do you currently need...    Turning from your back to your side while in flat bed without using bedrails? 3  -AS 3  -JR    Moving from lying on back to sitting on the side of a flat bed without bedrails? 3  -AS 2  -JR    Moving to and from a bed to a chair (including a wheelchair)? 3  -AS 3  -JR    Standing up from a chair using your arms (e.g., wheelchair, bedside chair)? 3  -AS 3  -JR    Climbing 3-5 steps with a railing? 3  -AS 2  -JR    To walk in hospital room? 3  -AS 3  -JR    AM-PAC 6 Clicks Score (PT) 18  -AS 16  -JR    Row Name 11/17/21 1008          Functional Assessment    Outcome Measure Options AM-PAC 6 Clicks Basic Mobility (PT)  -AS           User Key  (r) = Recorded By, (t) = Taken By, (c) = Cosigned By    Initials Name Provider Type    AS Lydia Alvarenga PTA Physical Therapy Assistant    Vijyaa Walls RN Registered Nurse                             Physical Therapy Education                 Title: PT OT SLP Therapies (In Progress)     Topic: Physical Therapy (In Progress)     Point: Mobility training (In Progress)     Learning Progress Summary           Patient Acceptance, E, NR by AS at 11/17/2021 1008    Acceptance, E, NR by WAI at 11/15/2021 1203                   Point: Home exercise program (In Progress)     Learning Progress Summary           Patient Acceptance, E, NR by AS at 11/17/2021 1008    Acceptance, E, NR by WAI at 11/15/2021 1203                   Point: Body mechanics (In Progress)     Learning Progress Summary           Patient Acceptance, E, NR by AS at 11/17/2021 1008    Acceptance, E, NR by WAI at 11/15/2021 1203                   Point: Precautions (In Progress)     Learning Progress Summary            Patient Acceptance, E, NR by AS at 11/17/2021 1008    Acceptance, E, NR by  at 11/15/2021 1203                               User Key     Initials Effective Dates Name Provider Type Discipline     06/16/21 -  Negar Dias PT Physical Therapist PT    AS 06/16/21 -  Lydia Alvarenga PTA Physical Therapy Assistant PT              PT Recommendation and Plan     Plan of Care Reviewed With: patient  Progress: improving  Outcome Summary: patient ambulated 200 feet with CGA x1 and rolling walker for support, tech followed with chair for safety. Patient took 1 seated rest break due to weakness. Patient with complaints of L rib pain during mobility 3/10. Completed B LE exercises without complaints.     Time Calculation:    PT Charges     Row Name 11/17/21 1008             Time Calculation    Start Time 0915  -AS      PT Received On 11/17/21  -AS      PT Goal Re-Cert Due Date 11/25/21  -AS              Timed Charges    96936 - PT Therapeutic Exercise Minutes 10  -AS      32651 - Gait Training Minutes  15  -AS              Total Minutes    Timed Charges Total Minutes 25  -AS       Total Minutes 25  -AS            User Key  (r) = Recorded By, (t) = Taken By, (c) = Cosigned By    Initials Name Provider Type    AS Lydia Alvarenga PTA Physical Therapy Assistant              Therapy Charges for Today     Code Description Service Date Service Provider Modifiers Qty    00872326874 HC GAIT TRAINING EA 15 MIN 11/17/2021 Lydia Alvarenga PTA GP 1    02483542472 HC PT THER PROC EA 15 MIN 11/17/2021 Lydia Alvarenga PTA GP 1    23661913579 HC PT THER SUPP EA 15 MIN 11/17/2021 Lydia Alvarenga PTA GP 2          PT G-Codes  Outcome Measure Options: AM-PAC 6 Clicks Basic Mobility (PT)  AM-PAC 6 Clicks Score (PT): 18  AM-PAC 6 Clicks Score (OT): 18    Lydia Alvarenga PTA  11/17/2021

## 2021-11-17 NOTE — PROGRESS NOTES
[facility] Medicine Services  PROGRESS NOTE    Patient Name: Nallely Mccabe  : 1950  MRN: [id number]    Date of Admission: 2021  Primary Care Physician: Shay Rivera MD    Subjective   Subjective     CC:   hyponatremia     HPI:  Sitting in the chair in good spirits.  No specific complaints for me today    ROS:  Gen- No fevers, chills  CV- No chest pain, palpitations  Resp- No cough, dyspnea  GI- No N/V/D, abd pain     Objective   Objective     Vital Signs:   Temp:  [97.5 °F (36.4 °C)-98.4 °F (36.9 °C)] 97.5 °F (36.4 °C)  Heart Rate:  [68-88] 87  Resp:  [18] 18  BP: (123-146)/(73-85) 132/81     Physical Exam:  Constitutional: No acute distress, awake, alert  HENT: NCAT, mucous membranes moist  Respiratory: Clear to auscultation bilaterally, respiratory effort normal   Cardiovascular: RRR, no murmurs, rubs, or gallops  Gastrointestinal: Positive bowel sounds, soft, nontender, nondistended  Musculoskeletal: No bilateral ankle edema  Psychiatric: Appropriate affect, cooperative  Neurologic: Oriented x 3 but seems to have some confusion, strength symmetric in all extremities, Cranial Nerves grossly intact to confrontation, speech clear  Skin: No rashes    Results Reviewed:  LAB RESULTS:      Lab 21  0939 21  0303 21  1739   WBC 5.79 8.62 12.07*   HEMOGLOBIN 11.1* 11.6* 11.9*   HEMATOCRIT 33.7* 32.8* 33.6*   PLATELETS 261 272 256   NEUTROS ABS  --  6.12 10.05*   IMMATURE GRANS (ABS)  --  0.05 0.06*   LYMPHS ABS  --  1.20 0.74   MONOS ABS  --  1.15* 1.19*   EOS ABS  --  0.06 0.01   MCV 88.9 82.4 83.8   LACTATE  --   --  1.6   PROTIME  --   --  12.5   APTT  --   --  35.9         Lab 21  0533 21  2320 21  1536 21  0747 21  0002 11/15/21  0816 11/15/21  0406 21  1230 21  0939 21  0702 21  0303 21  2044 21  1739   SODIUM 130* 129* 130* 129* 128*   < > 125*   < > 121*   < > 121*   < > 115*    POTASSIUM 4.4  --   --   --   --   --  4.5  --  3.9  --  3.9  --  4.4   CHLORIDE 98  --   --   --   --   --  93*  --  88*  --  86*  --  79*   CO2 22.0  --   --   --   --   --  23.0  --  20.0*  --  22.0  --  22.0   ANION GAP 10.0  --   --   --   --   --  9.0  --  13.0  --  13.0  --  14.0   BUN 7*  --   --   --   --   --  10  --  12  --  15  --  16   CREATININE 1.00  --   --   --   --   --  1.20*  --  1.41*  --  1.74*  --  1.83*   GLUCOSE 90  --   --   --   --   --  114*  --  103*  --  114*  --  107*   CALCIUM 8.9  --   --   --   --   --  8.6  --  8.8  --  9.0  --  9.2   PHOSPHORUS 3.4  --   --   --   --   --   --   --  3.6  --   --   --   --     < > = values in this interval not displayed.         Lab 11/17/21  0533 11/14/21  0939 11/12/21  1739   TOTAL PROTEIN  --   --  6.6   ALBUMIN 3.30* 3.30* 4.00   GLOBULIN  --   --  2.6   ALT (SGPT)  --   --  16   AST (SGOT)  --   --  21   BILIRUBIN  --   --  0.4   ALK PHOS  --   --  113         Lab 11/12/21  1739   TROPONIN T <0.010   PROTIME 12.5   INR 0.96             Lab 11/14/21  0939   IRON 83   IRON SATURATION 34   TIBC 241*   TRANSFERRIN 162*         Brief Urine Lab Results  (Last result in the past 365 days)      Color   Clarity   Blood   Leuk Est   Nitrite   Protein   CREAT   Urine HCG        11/15/21 0034             39.1               Microbiology Results Abnormal     Procedure Component Value - Date/Time    Blood Culture - Blood, Hand, Right [[id number]]  (Normal) Collected: 11/12/21 2030    Lab Status: Preliminary result Specimen: Blood from Hand, Right Updated: 11/16/21 2117     Blood Culture No growth at 4 days    Blood Culture - Blood, Arm, Left [[id number]]  (Normal) Collected: 11/12/21 2044    Lab Status: Preliminary result Specimen: Blood from Arm, Left Updated: 11/16/21 2117     Blood Culture No growth at 4 days    Urine Culture - Urine, Urine, Catheter [[id number]] Collected: 11/12/21 1821    Lab Status: Final result Specimen: Urine, Catheter Updated:  11/14/21 1025     Urine Culture 50,000 CFU/mL Normal Urogenital Brisa    COVID PRE-OP / PRE-PROCEDURE SCREENING ORDER (NO ISOLATION) - Swab, Nasopharynx [[id number]]  (Normal) Collected: 11/12/21 1731    Lab Status: Final result Specimen: Swab from Nasopharynx Updated: 11/12/21 1754    Narrative:      The following orders were created for panel order COVID PRE-OP / PRE-PROCEDURE SCREENING ORDER (NO ISOLATION) - Swab, Nasopharynx.  Procedure                               Abnormality         Status                     ---------                               -----------         ------                     COVID-19, ABBOTT IN-HOUS...[[id number]]  Normal              Final result                 Please view results for these tests on the individual orders.    COVID-19, ABBOTT IN-HOUSE,NASAL Swab (NO TRANSPORT MEDIA) 2 HR TAT - Swab, Nasopharynx [[id number]]  (Normal) Collected: 11/12/21 1731    Lab Status: Final result Specimen: Swab from Nasopharynx Updated: 11/12/21 1754     COVID19 Presumptive Negative    Narrative:      Fact sheet for providers: https://www.fda.gov/media/091779/download     Fact sheet for patients: https://www.fda.gov/media/521976/download    Test performed by PCR.  If inconsistent with clinical signs and symptoms patient should be tested with different authorized molecular test.          No radiology results from the last 24 hrs        I have reviewed the medications:  Scheduled Meds:aspirin EC, 325 mg, Oral, Daily  buPROPion XL, 300 mg, Oral, Daily  carvedilol, 12.5 mg, Oral, BID With Meals  clopidogrel, 75 mg, Oral, Daily  fluticasone, 1 spray, Nasal, Daily  pantoprazole, 40 mg, Oral, Daily  pravastatin, 20 mg, Oral, Nightly  sodium chloride, 10 mL, Intravenous, Q12H      Continuous Infusions:   PRN Meds:.•  acetaminophen **OR** acetaminophen **OR** acetaminophen  •  ALPRAZolam  •  benzonatate  •  ondansetron  •  [COMPLETED] Insert peripheral IV **AND** sodium chloride  •  sodium  chloride    Assessment/Plan   Assessment & Plan     Active Hospital Problems    Diagnosis  POA   • **Acute hyponatremia [E87.1]  Yes   • Fall [W19.XXXA]  Yes   • Rib fractures [S22.49XA]  Yes   • Bacteriuria [R82.71]  Yes   • CAD (coronary artery disease) [I25.10]  Yes   • Hypertension [I10]  Yes   • Hyperlipidemia [E78.5]  Yes   • GERD (gastroesophageal reflux disease) [K21.9]  Yes   • Anxiety [F41.9]  Yes      Resolved Hospital Problems   No resolved problems to display.        Brief Hospital Course to date:  71 year old female presents to the ED accompanied by son due to fall  with confusion. Na 115 on presentation. Nephrology consulted.      Acute hyponatremia  -likely due to restarting losartan-HCTZ: Holding  -Sodium now 130  -Likely able to medically discharge today.  However we are pending home health set up     Fall   Rib fractures   -CT of head showed scalp hematoma overlying the right posterior parietal bone.  Chronic changes seen within the brain with no acute intracranial abnormality.  -fall precautions  -CT of lumbar spine, thoracic spine as mentioned above  -CT of chest with noted rib fractures   -Working with PT and OT     Elevated creatinine on CKD III   - issue with renal function for quite some time per patient   -likely secondary to recent bactrim; diuretics   -baseline creatinine 1.3-1.7  -hold nephrotoxic agents  -monitor      Bacteriuria  -on bactrim: hold  - UCx normal michael  - Rocephin x 3 days      generalized anxiety   -on xanax - continue home med   -BILLY verified      Essential hypertension   - hold losartan HCTZ   - Currently BP controlled      Hyperlipidemia  -continue statin      CAD  -continue DAPT     DVT prophylaxis:  Mechanical DVT prophylaxis orders are present.       AM-PAC 6 Clicks Score (PT): 18 (11/17/21 1008)    Disposition: I expect the patient to be discharged likely tomorrow if home health set up    CODE STATUS:   Code Status and Medical Interventions:   Ordered at:  11/12/21 1959     Level Of Support Discussed With:    Patient     Code Status (Patient has no pulse and is not breathing):    CPR (Attempt to Resuscitate)     Medical Interventions (Patient has pulse or is breathing):    Full Support       Kaelyn Moody MD  11/17/21

## 2021-11-18 VITALS
TEMPERATURE: 97.9 F | RESPIRATION RATE: 18 BRPM | DIASTOLIC BLOOD PRESSURE: 72 MMHG | HEART RATE: 79 BPM | OXYGEN SATURATION: 93 % | HEIGHT: 65 IN | BODY MASS INDEX: 32.32 KG/M2 | WEIGHT: 194 LBS | SYSTOLIC BLOOD PRESSURE: 130 MMHG

## 2021-11-18 LAB
ANION GAP SERPL CALCULATED.3IONS-SCNC: 8 MMOL/L (ref 5–15)
BUN SERPL-MCNC: 9 MG/DL (ref 8–23)
BUN/CREAT SERPL: 8.9 (ref 7–25)
CALCIUM SPEC-SCNC: 8.9 MG/DL (ref 8.6–10.5)
CHLORIDE SERPL-SCNC: 99 MMOL/L (ref 98–107)
CO2 SERPL-SCNC: 23 MMOL/L (ref 22–29)
CREAT SERPL-MCNC: 1.01 MG/DL (ref 0.57–1)
GFR SERPL CREATININE-BSD FRML MDRD: 54 ML/MIN/1.73
GLUCOSE SERPL-MCNC: 89 MG/DL (ref 65–99)
POTASSIUM SERPL-SCNC: 4.6 MMOL/L (ref 3.5–5.2)
SODIUM SERPL-SCNC: 130 MMOL/L (ref 136–145)
SODIUM SERPL-SCNC: 130 MMOL/L (ref 136–145)

## 2021-11-18 PROCEDURE — 80048 BASIC METABOLIC PNL TOTAL CA: CPT | Performed by: STUDENT IN AN ORGANIZED HEALTH CARE EDUCATION/TRAINING PROGRAM

## 2021-11-18 PROCEDURE — 97530 THERAPEUTIC ACTIVITIES: CPT

## 2021-11-18 PROCEDURE — 99239 HOSP IP/OBS DSCHRG MGMT >30: CPT | Performed by: NURSE PRACTITIONER

## 2021-11-18 PROCEDURE — 97535 SELF CARE MNGMENT TRAINING: CPT

## 2021-11-18 RX ORDER — SODIUM CHLORIDE 1000 MG
1 TABLET, SOLUBLE MISCELLANEOUS ONCE
Status: COMPLETED | OUTPATIENT
Start: 2021-11-18 | End: 2021-11-18

## 2021-11-18 RX ORDER — CARVEDILOL 12.5 MG/1
12.5 TABLET ORAL 2 TIMES DAILY WITH MEALS
Qty: 60 TABLET | Refills: 0 | Status: SHIPPED | OUTPATIENT
Start: 2021-11-18 | End: 2022-10-28 | Stop reason: SDUPTHER

## 2021-11-18 RX ADMIN — CARVEDILOL 12.5 MG: 12.5 TABLET, FILM COATED ORAL at 09:13

## 2021-11-18 RX ADMIN — BUPROPION HYDROCHLORIDE 300 MG: 150 TABLET, FILM COATED, EXTENDED RELEASE ORAL at 09:13

## 2021-11-18 RX ADMIN — ACETAMINOPHEN 650 MG: 325 TABLET, FILM COATED ORAL at 09:22

## 2021-11-18 RX ADMIN — CLOPIDOGREL BISULFATE 75 MG: 75 TABLET ORAL at 09:12

## 2021-11-18 RX ADMIN — PANTOPRAZOLE SODIUM 40 MG: 40 TABLET, DELAYED RELEASE ORAL at 09:13

## 2021-11-18 RX ADMIN — Medication 1 G: at 13:59

## 2021-11-18 RX ADMIN — ASPIRIN 325 MG: 325 TABLET, COATED ORAL at 09:13

## 2021-11-18 RX ADMIN — FLUTICASONE PROPIONATE 1 SPRAY: 50 SPRAY, METERED NASAL at 09:12

## 2021-11-18 NOTE — PLAN OF CARE
Problem: Adult Inpatient Plan of Care  Goal: Plan of Care Review  Recent Flowsheet Documentation  Taken 11/18/2021 1124 by Enrike Rivera, OT  Progress: improving  Plan of Care Reviewed With: patient  Outcome Summary:   VSS   Pt progressing well with fxl mobility requiring SBA for bed and fxl mobility, set up/Mod Ind with all grooming, required assist with post toilet hygiene. Continue per OT POC. Recommend home with HHservices at discharge.   Goal Outcome Evaluation:  Plan of Care Reviewed With: patient        Progress: improving  Outcome Summary: VSS; Pt progressing well with fxl mobility requiring SBA for bed and fxl mobility, set up/Mod Ind with all grooming, required assist with post toilet hygiene. Continue per OT POC. Recommend home with HHservices at discharge.

## 2021-11-18 NOTE — DISCHARGE SUMMARY
[facility]  DISCHARGE SUMMARY    Patient Name: Nallely Mccabe  : 1950  MRN: [id number]    Date of Admission: 2021  4:06 PM  Date of Discharge:  2021  Primary Care Physician: Shay Rivera MD    Consults     Date and Time Order Name Status Description    2021  1:51 AM Inpatient Nephrology Consult Completed           Hospital Course     Presenting Problem:   Acute hyponatremia [E87.1]    Active Hospital Problems    Diagnosis  POA   • **Acute hyponatremia [E87.1]  Yes   • Fall [W19.XXXA]  Yes   • Rib fractures [S22.49XA]  Yes   • Bacteriuria [R82.71]  Yes   • CAD (coronary artery disease) [I25.10]  Yes   • Hypertension [I10]  Yes   • Hyperlipidemia [E78.5]  Yes   • GERD (gastroesophageal reflux disease) [K21.9]  Yes   • Anxiety [F41.9]  Yes      Resolved Hospital Problems   No resolved problems to display.          Hospital Course:  Nallely Mccabe is a 71 y.o. female presented to the ED accompanied by son due to fall  with confusion. Na 115 on presentation. Admitted to hospital medicine services. Nephrology consulted.      Acute hyponatremia  -likely due to restarting losartan-HCTZ: continue to hold.   -Sodium now 130.   -Continue 1.5L fluid restriction and liberalize sodium in diet  -follow up with NAL in 2 weeks       Fall   Rib fractures   -CT of head showed scalp hematoma overlying the right posterior parietal bone.  Chronic changes seen within the brain with no acute intracranial abnormality.  -fall precautions  -CT of lumbar spine, thoracic spine as mentioned above  -CT of chest with noted rib fractures   -Working with PT and OT- okay to discharge home with home health.      Elevated creatinine on CKD III   - issue with renal function for quite some time per patient   -likely secondary to recent bactrim; diuretics   -baseline creatinine 1.3-1.7  -hold nephrotoxic agents      Bacteriuria  - UCx normal michael  - s/p Rocephin x 3 days       generalized anxiety   -on xanax - continue home [facility]   -BILLY verified      Essential hypertension   - hold losartan HCTZ   - Currently BP controlled      Hyperlipidemia  -continue statin      CAD  -continue DAPT       Discharge Follow Up Recommendations for outpatient labs/diagnostics:   Follow up with PCP, first available hospital follow up appt  Follow up with NAL in 2 weeks.    Day of Discharge     HPI:   Working hard with PT this morning during my visit.  Feels well.  Having rib pain but is able to manage it.     Review of Systems  Gen- No fevers, chills  CV- No chest pain, palpitations  Resp- No cough, dyspnea  GI- No N/V/D, abd pain        Vital Signs:   Temp:  [97.5 °F (36.4 °C)-97.9 °F (36.6 °C)] 97.9 °F (36.6 °C)  Heart Rate:  [69-89] 79  Resp:  [18] 18  BP: (112-142)/(72-99) 130/72     Physical Exam:  Constitutional: No acute distress, awake, alert, on side of bed with PT  HENT: NCAT, mucous membranes moist  Respiratory: Clear to auscultation bilaterally, respiratory effort normal on room air  Cardiovascular: RRR, no murmurs, rubs, or gallops  Gastrointestinal: Positive bowel sounds, soft, nontender, nondistended  Musculoskeletal: No bilateral ankle edema  Psychiatric: Appropriate affect, cooperative  Neurologic: Oriented x 3, strength symmetric in all extremities, Cranial Nerves grossly intact to confrontation, speech clear  Skin: No rashes noted to exposed skin       Pertinent  and/or Most Recent Results     LAB RESULTS:      Lab 11/14/21  0939 11/13/21  0303 11/12/21  1739   WBC 5.79 8.62 12.07*   HEMOGLOBIN 11.1* 11.6* 11.9*   HEMATOCRIT 33.7* 32.8* 33.6*   PLATELETS 261 272 256   NEUTROS ABS  --  6.12 10.05*   IMMATURE GRANS (ABS)  --  0.05 0.06*   LYMPHS ABS  --  1.20 0.74   MONOS ABS  --  1.15* 1.19*   EOS ABS  --  0.06 0.01   MCV 88.9 82.4 83.8   LACTATE  --   --  1.6   PROTIME  --   --  12.5   APTT  --   --  35.9         Lab 11/18/21  0723 11/17/21  2356 11/17/21  1531 11/17/21  0523  11/16/21  2320 11/15/21  0816 11/15/21  0406 11/14/21  1230 11/14/21  0939 11/13/21  0702 11/13/21  0303   SODIUM 130* 130* 128* 130* 129*   < > 125*   < > 121*   < > 121*   POTASSIUM 4.6  --   --  4.4  --   --  4.5  --  3.9  --  3.9   CHLORIDE 99  --   --  98  --   --  93*  --  88*  --  86*   CO2 23.0  --   --  22.0  --   --  23.0  --  20.0*  --  22.0   ANION GAP 8.0  --   --  10.0  --   --  9.0  --  13.0  --  13.0   BUN 9  --   --  7*  --   --  10  --  12  --  15   CREATININE 1.01*  --   --  1.00  --   --  1.20*  --  1.41*  --  1.74*   GLUCOSE 89  --   --  90  --   --  114*  --  103*  --  114*   CALCIUM 8.9  --   --  8.9  --   --  8.6  --  8.8  --  9.0   PHOSPHORUS  --   --   --  3.4  --   --   --   --  3.6  --   --     < > = values in this interval not displayed.         Lab 11/17/21  0533 11/14/21  0939 11/12/21  1739   TOTAL PROTEIN  --   --  6.6   ALBUMIN 3.30* 3.30* 4.00   GLOBULIN  --   --  2.6   ALT (SGPT)  --   --  16   AST (SGOT)  --   --  21   BILIRUBIN  --   --  0.4   ALK PHOS  --   --  113         Lab 11/12/21  1739   TROPONIN T <0.010   PROTIME 12.5   INR 0.96             Lab 11/14/21  0939   IRON 83   IRON SATURATION 34   TIBC 241*   TRANSFERRIN 162*         Brief Urine Lab Results  (Last result in the past 365 days)      Color   Clarity   Blood   Leuk Est   Nitrite   Protein   CREAT   Urine HCG        11/15/21 0034             39.1             Microbiology Results (last 10 days)     Procedure Component Value - Date/Time    Blood Culture - Blood, Arm, Left [[id number]]  (Normal) Collected: 11/12/21 2044    Lab Status: Final result Specimen: Blood from Arm, Left Updated: 11/17/21 2115     Blood Culture No growth at 5 days    Blood Culture - Blood, Hand, Right [[id number]]  (Normal) Collected: 11/12/21 2030    Lab Status: Final result Specimen: Blood from Hand, Right Updated: 11/17/21 2115     Blood Culture No growth at 5 days    Urine Culture - Urine, Urine, Catheter [[id number]] Collected: 11/12/21 1821     Lab Status: Final result Specimen: Urine, Catheter Updated: 11/14/21 1025     Urine Culture 50,000 CFU/mL Normal Urogenital Brisa    COVID PRE-OP / PRE-PROCEDURE SCREENING ORDER (NO ISOLATION) - Swab, Nasopharynx [[id number]]  (Normal) Collected: 11/12/21 1731    Lab Status: Final result Specimen: Swab from Nasopharynx Updated: 11/12/21 1754    Narrative:      The following orders were created for panel order COVID PRE-OP / PRE-PROCEDURE SCREENING ORDER (NO ISOLATION) - Swab, Nasopharynx.  Procedure                               Abnormality         Status                     ---------                               -----------         ------                     COVID-19, ABBOTT IN-HOUS...[[id number]]  Normal              Final result                 Please view results for these tests on the individual orders.    COVID-19, ABBOTT IN-HOUSE,NASAL Swab (NO TRANSPORT MEDIA) 2 HR TAT - Swab, Nasopharynx [[id number]]  (Normal) Collected: 11/12/21 1731    Lab Status: Final result Specimen: Swab from Nasopharynx Updated: 11/12/21 1754     COVID19 Presumptive Negative    Narrative:      Fact sheet for providers: https://www.fda.gov/media/017229/download     Fact sheet for patients: https://www.fda.gov/media/136529/download    Test performed by PCR.  If inconsistent with clinical signs and symptoms patient should be tested with different authorized molecular test.          CT Head Without Contrast    Result Date: 11/15/2021  EXAMINATION: CT HEAD WO CONTRAST - 11/12/2021  INDICATION: Fall head injury, trauma.  TECHNIQUE: Multiple axial CT imaging is obtained of the head from skull base to skull vertex without the administration of intravenous contrast.  The radiation dose reduction device was turned on for each scan per the ALARA (As Low as Reasonably Achievable) protocol.  COMPARISON: None  FINDINGS: There is a scalp hematoma overlying the right posterior parietal bone. There is a low-density area seen throughout the  periventricular and subcortical white matter suggesting chronic small vessel ischemic change. No hemorrhage or hydrocephalus. No mass, mass effect, or midline shift. The bony structures reveal no evidence of osseous abnormality. The visualized paranasal sinuses are clear. The mastoid air cells are patent.      Scalp hematoma overlying the right posterior parietal bone. Chronic changes seen within the brain with no acute intracranial abnormality.  DICTATED:   11/12/2021 EDITED/lfs:   11/12/2021   This report was finalized on 11/15/2021 9:49 AM by Dr. Elva Suarez MD.      CT Chest Without Contrast Diagnostic    Result Date: 11/15/2021  EXAMINATION: CT CHEST WO CONTRAST DIAGNOSTIC - 11/12/2021  INDICATION: Fall, left posterior chest wall pain.  TECHNIQUE: Multiple axial CT imaging is obtained of the chest without the administration of intravenous contrast.  The radiation dose reduction device was turned on for each scan per the ALARA (As Low as Reasonably Achievable) protocol.  COMPARISON: None  FINDINGS: Some mild stranding identified in the subcutaneous tissues in the left posterior chest wall. Findings suggest a small hematoma. There are several nondisplaced rib fractures involving the left posterior sixth, seventh, eighth, and ninth ribs. There is a tiny effusion identified on the left. There are atelectatic changes in the left lung base. No pneumothorax. Degenerative changes seen within the spine.  Cardiac chambers within normal limits. There is no pericardial effusion. No bulky hilar or axillary lymphadenopathy. The upper abdomen is unremarkable.      Hematoma identified in the subcutaneous tissues along the left posterior chest wall with nondisplaced fractures involving the left posterior sixth, seventh, eighth, and ninth ribs with small left pleural effusion and atelectatic changes seen at the left lung base.  DICTATED:   11/12/2021 EDITED/lfs:   11/12/2021   This report was finalized on 11/15/2021 9:49  AM by Dr. Elva Suarez MD.      CT Cervical Spine Without Contrast    Result Date: 11/15/2021  EXAMINATION: CT CERVICAL SPINE WO CONTRAST - 11/12/2021  INDICATION: Fall. Knot on the posterior head with neck pain.  TECHNIQUE: Multiple axial CT imaging is obtained of the cervical spine without the administration of intravenous contrast. Coronal and sagittal reformatted images were submitted to further facilitate diagnostic accuracy and treatment planning.  The radiation dose reduction device was turned on for each scan per the ALARA (As Low as Reasonably Achievable) protocol.  COMPARISON: None  FINDINGS: The lateral masses are well aligned. The odontoid tip is unremarkable. Minimal degenerative changes identified within the posterior facets. There is slight curvature identified of the cervical spine with convexity to the left. No fracture or dislocation. There is mild anterior spurring seen of the C4/C5, C5/C6 and C6/C7 levels.      Multilevel degenerative changes identified throughout the cervical spine with no evidence of fracture or dislocation.  DICTATED:   11/12/2021 EDITED/lfs:   11/12/2021   This report was finalized on 11/15/2021 9:49 AM by Dr. Elva Suarez MD.      CT Thoracic Spine Without Contrast    Result Date: 11/15/2021  EXAMINATION: CT THORACIC SPINE WO CONTRAST - 11/12/2021  INDICATION: Fall, mid back pain.  TECHNIQUE: Multiple axial CT imaging is obtained of the thoracic spine without the administration of intravenous contrast. Coronal and sagittal reformatted images were submitted to further facilitate diagnostic accuracy and treatment planning.  The radiation dose reduction device was turned on for each scan per the ALARA (As Low as Reasonably Achievable) protocol.  COMPARISON: None  FINDINGS: There is a tiny effusion identified on the left. The vertebral body height and disc spaces are preserved. The facets are well aligned. No irregularity identified to suggest evidence of fracture.  Mild anterior spurring seen diffusely throughout the thoracic spine. There is mild anterior wedging identified and mild loss of height of the T12 vertebral body level however there is no definite evidence of acute fracture. Findings are likely chronic. There is a Schmorl's node seen along the superior endplate of T12. No other loss of height of the vertebral bodies.      Wedging involving T12. There is a Schmorl's node seen along the superior endplate. No evidence of acute bony abnormality. Findings are likely chronic with multilevel degenerative changes seen within the mid and lower thoracic vertebral body levels with mild anterior spurring. No fracture or dislocation.  DICTATED:   11/12/2021 EDITED/lfs:   11/12/2021   This report was finalized on 11/15/2021 9:49 AM by Dr. Elva Suarez MD.      CT Lumbar Spine Without Contrast    Result Date: 11/15/2021  EXAMINATION: CT LUMBAR SPINE WO CONTRAST - 11/12/2021  INDICATION: Fall, low back pain.  TECHNIQUE: Multiple axial CT imaging is obtained of lumbar spine without the administration of intravenous contrast. Coronal and sagittal reformatted images were submitted to further facilitate diagnostic accuracy and treatment planning.  The radiation dose reduction device was turned on for each scan per the ALARA (As Low as Reasonably Achievable) protocol.  COMPARISON: None  FINDINGS: The vertebral body height and disc spaces are preserved. Facets are well aligned. Pedicles are intact. There are mild degenerative changes seen in the SA joints bilaterally. There is no loss of height of the vertebral bodies with disc space narrowing seen at the L3/L4, L4/L5 and L5/S1 levels. There is no fracture or dislocation. Mild anterior spurring involving the L1, L2, and L3 levels. Facets are well aligned. Pedicles are intact.      Multilevel degenerative changes identified diffusely throughout the lumbar spine with no evidence of fracture or dislocation.  DICTATED:   11/12/2021  EDITED/lfs:   11/12/2021   This report was finalized on 11/15/2021 9:49 AM by Dr. Elva Suarez MD.                    Plan for Follow-up of Pending Labs/Results:     Discharge Details        Discharge Medications      New Medications      Instructions Start Date   carvedilol 12.5 MG tablet  Commonly known as: COREG   12.5 mg, Oral, 2 Times Daily With Meals         Continue These Medications      Instructions Start Date   acetaminophen 500 MG tablet  Commonly known as: TYLENOL   500 mg, Oral, As Needed      ALPRAZolam 1 MG tablet  Commonly known as: XANAX   1 mg, Oral, 3 Times Daily PRN      aspirin  MG tablet   325 mg, Oral, Daily      BENEFIBER PO   Oral      benzonatate 100 MG capsule  Commonly known as: TESSALON   100 mg, Oral, As Needed      buPROPion  MG 24 hr tablet  Commonly known as: WELLBUTRIN XL   300 mg, Oral, Daily      clopidogrel 75 MG tablet  Commonly known as: PLAVIX   75 mg, Oral, Daily      diclofenac 1 % gel gel  Commonly known as: VOLTAREN   4 g, Topical, As Needed      dicyclomine 10 MG capsule  Commonly known as: BENTYL   10 mg, Oral, As Needed      estradiol 10 MCG tablet vaginal tablet  Commonly known as: VAGIFEM   1 tablet, Vaginal, 2 Times Weekly      fluticasone 50 MCG/ACT nasal spray  Commonly known as: FLONASE   1 spray, Nasal, Daily      lansoprazole 30 MG capsule  Commonly known as: PREVACID   TAKE 1 CAPSULE BY MOUTH TWICE DAILY 20 MINUTES BEFORE MEALS      meclizine 25 MG tablet  Commonly known as: ANTIVERT   25 mg, Oral, As Needed      MegaRed Omega-3 Krill Oil 500 MG capsule   1,000 mg, Oral, Daily      ondansetron 4 MG tablet  Commonly known as: ZOFRAN   take 1 tablet by mouth four times a day if needed for nausea      pravastatin 20 MG tablet  Commonly known as: PRAVACHOL   20 mg, Oral, Every Night at Bedtime      PRESERVISION AREDS 2 PO   1 tablet, Oral, 2 Times Daily      Refresh Tears 0.5 % solution  Generic drug: carboxymethylcellulose   2 drops, Both Eyes, 3  Times Daily PRN         Stop These Medications    losartan-hydrochlorothiazide 50-12.5 MG per tablet  Commonly known as: Hyzaar            No Known Allergies      Discharge Disposition:  Home-Health Care Svc    Diet:  Hospital:  Diet Order   Procedures   • Diet Regular; Cardiac       Activity:  Activity Instructions     Activity as Tolerated                   CODE STATUS:    Code Status and Medical Interventions:   Ordered at: 11/12/21 1959     Level Of Support Discussed With:    Patient     Code Status (Patient has no pulse and is not breathing):    CPR (Attempt to Resuscitate)     Medical Interventions (Patient has pulse or is breathing):    Full Support       Future Appointments   Date Time Provider Department Center   3/4/2022  2:00 PM Kandy Chand MD [facility]       Additional Instructions for the Follow-ups that You Need to Schedule     Ambulatory Referral to Home Health   As directed      Face to Face Visit Date: 11/16/2021    Follow-up provider for Plan of Care?: I treated the patient in an acute care facility and will not continue treatment after discharge.    Follow-up provider: SHAY RIVERA [[id number]]    Reason/Clinical Findings: acute hyponatremia    Describe mobility limitations that make leaving home difficult: impaired functional mobility, gait, balance and endurance    Nursing/Therapeutic Services Requested: Physical Therapy Occupational Therapy    PT orders: Strengthening Therapeutic exercise    Occupational orders: Activities of daily living Strengthening    Frequency: 1 Week 1         Discharge Follow-up with PCP   As directed       Currently Documented PCP:    Shay Rivera MD    PCP Phone Number:    [phone]     Follow Up Details: first available hospital follow up appt.         Discharge Follow-up with Specified Provider: Nephmarixa sanchez; 2 Weeks   As directed      To: Nephrology associates    Follow Up: 2 Weeks                     PARISA Cho  11/18/21      Time  Spent on Discharge:  I spent  40  minutes on this discharge activity which included: face-to-face encounter with the patient, reviewing the data in the system, coordination of the care with the nursing staff as well as consultants, documentation, and entering orders.

## 2021-11-18 NOTE — CASE MANAGEMENT/SOCIAL WORK
Case Management Discharge Note      Final Note: Patient has been accepted by [facility], and they are aware that patient is being discharged today.  Patient plans on returning home, and having her daughter stay with her.         Selected Continued Care - Admitted Since 11/12/2021     Destination    No services have been selected for the patient.              Durable Medical Equipment    No services have been selected for the patient.              Dialysis/Infusion    No services have been selected for the patient.              Home Medical Care Coordination complete.    Service Provider Selected Services Address Phone Fax Patient Preferred    [facility] [place]  [facility] [address] [place] [address] [phone] --          Therapy    No services have been selected for the patient.              Community Resources    No services have been selected for the patient.              Community & DME    No services have been selected for the patient.                       Final Discharge Disposition Code: 06 - home with home health care

## 2021-11-18 NOTE — PROGRESS NOTES
"   LOS: 6 days    Patient Care Team:  Shay Rivera MD as PCP - General  Shay Rivera MD as PCP - Family Medicine    Chief Complaint: Fall, hyponatremia  71-year-old history of depression, hyperlipidemia, essential hypertension, CAD, anxiety presented to ED with confusion and fall, has been on HCTZ, serum sodium 115, history of chronic kidney disease for the last 3 years, admitting creatinine 1.83 BUN 16.  Liver enzymes were normal.  Subjective     Interval History:   No acute events overnight. No new complaints.     Review of Systems:   No new complaints no nausea vomiting diarrhea constipation.    Objective     Vital Sign Min/Max for last 24 hours  Temp  Min: 97.5 °F (36.4 °C)  Max: 97.9 °F (36.6 °C)   BP  Min: 112/99  Max: 142/86   Pulse  Min: 68  Max: 89   Resp  Min: 18  Max: 18   SpO2  Min: 94 %  Max: 100 %   No data recorded   No data recorded     Flowsheet Rows      First Filed Value   Admission Height 165.1 cm (65\") Documented at 11/12/2021 1458   Admission Weight 88 kg (194 lb) Documented at 11/12/2021 1458          No intake/output data recorded.  I/O last 3 completed shifts:  In: 1080 [P.O.:1080]  Out: 900 [Urine:900]    Physical Exam:    Gen: Alert, NAD   HENT: NC, AT, EOMI   NECK: Supple, no JVD, Trachea midline   LUNGS: CTA bilaterally, non labored respirtation   CVS: S1/S2 audible, RRR, no murmur   Abd: Soft, NT, ND, BS+   Ext: No pedal edema, no cyanosis   CNS: Alert, No focal deficit noted grossly  Psy: Cooperative  Skin: Warm, dry and intact    WBC No results found for: WBC   HGB No results found for: HGB   HCT No results found for: HCT   Platlets No results found for: LABPLAT   MCV No results found for: MCV       Sodium Sodium   Date Value Ref Range Status   11/18/2021 130 (L) 136 - 145 mmol/L Final   11/17/2021 130 (L) 136 - 145 mmol/L Final   11/17/2021 128 (L) 136 - 145 mmol/L Final   11/17/2021 130 (L) 136 - 145 mmol/L Final   11/16/2021 129 (L) 136 - 145 mmol/L Final   11/16/2021 " 130 (L) 136 - 145 mmol/L Final   11/16/2021 129 (L) 136 - 145 mmol/L Final   11/16/2021 128 (L) 136 - 145 mmol/L Final   11/15/2021 126 (L) 136 - 145 mmol/L Final   11/15/2021 126 (L) 136 - 145 mmol/L Final      Potassium Potassium   Date Value Ref Range Status   11/18/2021 4.6 3.5 - 5.2 mmol/L Final   11/17/2021 4.4 3.5 - 5.2 mmol/L Final     Comment:     Slight hemolysis detected by analyzer. Results may be affected.      Chloride Chloride   Date Value Ref Range Status   11/18/2021 99 98 - 107 mmol/L Final   11/17/2021 98 98 - 107 mmol/L Final      CO2 CO2   Date Value Ref Range Status   11/18/2021 23.0 22.0 - 29.0 mmol/L Final   11/17/2021 22.0 22.0 - 29.0 mmol/L Final      BUN BUN   Date Value Ref Range Status   11/18/2021 9 8 - 23 mg/dL Final   11/17/2021 7 (L) 8 - 23 mg/dL Final      Creatinine Creatinine   Date Value Ref Range Status   11/18/2021 1.01 (H) 0.57 - 1.00 mg/dL Final   11/17/2021 1.00 0.57 - 1.00 mg/dL Final      Calcium Calcium   Date Value Ref Range Status   11/18/2021 8.9 8.6 - 10.5 mg/dL Final   11/17/2021 8.9 8.6 - 10.5 mg/dL Final      PO4 No results found for: CAPO4   Albumin Albumin   Date Value Ref Range Status   11/17/2021 3.30 (L) 3.50 - 5.20 g/dL Final      Magnesium No results found for: MG   Uric Acid No results found for: URICACID        Results Review:     I reviewed the patient's new clinical results.    aspirin EC, 325 mg, Oral, Daily  buPROPion XL, 300 mg, Oral, Daily  carvedilol, 12.5 mg, Oral, BID With Meals  clopidogrel, 75 mg, Oral, Daily  fluticasone, 1 spray, Nasal, Daily  pantoprazole, 40 mg, Oral, Daily  pravastatin, 20 mg, Oral, Nightly  sodium chloride, 10 mL, Intravenous, Q12H           Medication Review: Reviewed    Assessment/Plan       Acute hyponatremia    CAD (coronary artery disease)    Hypertension    Hyperlipidemia    GERD (gastroesophageal reflux disease)    Anxiety    Fall    Rib fractures    Bacteriuria     1.  Severe hyponatremia: Asymptomatic at this  time.  Patient sodium 118, patient was on hydrochlorothiazide at home.  She has a history of hyponatremia in the past with hydrochlorothiazide which was stopped previously by the primary care physician, patient went on her own to restart it as her blood pressure was running high.  Urine sodium less than 20, urine osmolality 200.  She also had significant pain from fracture of rib, pain and nausea can increase ADH secretion, also heparin and action on aldosterone secretion as well as tubular action.  2.  CKD stage III: According to the patient patient has this problem for last 3 years.  3.  History of hypertension.  4.  Anemia.    Recommendations:  - Continue to hold HCTZ   - Fluid restriction 1.2 lit/day    - Avoid nephrotoxic medications.  - Liberalize salt intake.      Follow up with NAL in 2 weeks with renal function panel and UA     Oniel Hager MD  11/18/21  09:19 EST

## 2021-11-18 NOTE — THERAPY TREATMENT NOTE
Patient Name: Nallely Mccabe  : 1950    MRN: [id number]                              Today's Date: 2021       Admit Date: 2021    Visit Dx:     ICD-10-CM ICD-9-CM   1. Acute hyponatremia  E87.1 276.1   2. Frequent falls  R29.6 V15.88   3. Acute renal failure, unspecified acute renal failure type (HCC)  N17.9 584.9   4. Closed fracture of multiple ribs of left side, initial encounter  S22.42XA 807.09   5. Pleural effusion on left  J90 511.9   6. Acute urinary tract infection  N39.0 599.0     Patient Active Problem List   Diagnosis   • CAD (coronary artery disease)   • Hypertension   • Hyperlipidemia   • GERD (gastroesophageal reflux disease)   • Anxiety   • Depression   • Obesity (BMI 30-39.9)   • Metabolic encephalopathy   • Acute hyponatremia   • Fall   • Rib fractures   • Bacteriuria     Past Medical History:   Diagnosis Date   • Anxiety    • CAD (coronary artery disease)    • Depression    • GERD (gastroesophageal reflux disease)    • Hyperlipidemia    • Hypertension    • Obesity (BMI 30.0-34.9)    • Pyelonephritis    • Pyelonephritis, acute 2018    Right sided   • Sepsis (HCC)    • Sepsis (HCC) 2018   • UTI (urinary tract infection)      Past Surgical History:   Procedure Laterality Date   • CORONARY ANGIOPLASTY  10/18/2011   • EYE SURGERY     • ORIF HUMERUS FRACTURE     • SINUS SURGERY        General Information     Row Name 21 1124          OT Time and Intention    Document Type therapy note (daily note)  -TA     Mode of Treatment occupational therapy  -TA     Row Name 21 1124          General Information    Patient Profile Reviewed yes  -TA     Existing Precautions/Restrictions fall  Rib fractures  -TA     Barriers to Rehab none identified  -TA     Row Name 21 1124          Cognition    Orientation Status (Cognition) oriented x 4  -TA     Row Name 21 1124          Safety Issues, Functional Mobility    Impairments Affecting Function (Mobility)  endurance/activity tolerance; strength; balance  -TA           User Key  (r) = Recorded By, (t) = Taken By, (c) = Cosigned By    Initials Name Provider Type    Enrike Gonzalez OT Occupational Therapist                 Mobility/ADL's     Row Name 11/18/21 1124          Bed Mobility    Bed Mobility supine-sit  -TA     Supine-Sit [place] (Bed Mobility) standby assist  -TA     Bed Mobility, Safety Issues decreased use of legs for bridging/pushing; decreased use of arms for pushing/pulling  -TA     Assistive Device (Bed Mobility) head of bed elevated; bed rails  -TA     Row Name 11/18/21 1124          Transfers    Transfers sit-stand transfer; toilet transfer  -TA     Sit-Stand [place] (Transfers) standby assist  -TA     [place] Level (Toilet Transfer) standby assist  -TA     Assistive Device (Toilet Transfer) commode, bedside without drop arms  -TA     Row Name 11/18/21 1124          Sit-Stand Transfer    Assistive Device (Sit-Stand Transfers) walker, front-wheeled  -TA     Row Name 11/18/21 1124          Toilet Transfer    Type (Toilet Transfer) sit-stand; stand-sit  -TA     Row Name 11/18/21 1124          Functional Mobility    Functional Mobility- Ind. Level supervision required  -TA     Functional Mobility- Device rolling walker  -TA     Functional Mobility-Distance (Feet) 8  -TA     Functional Mobility- Comment No unsteadiness or LOB  -TA     Row Name 11/18/21 1124          Activities of Daily Living    BADL Assessment/Intervention grooming; toileting  -TA     Row Name 11/18/21 1124          Grooming Assessment/Training    [place] Level (Grooming) wash face, hands; oral care regimen; hair care, combing/brushing; set up; modified independence  -TA     Position (Grooming) supported sitting  -TA     Row Name 11/18/21 1124          Toileting Assessment/Training    [place] Level (Toileting) adjust/manage clothing; standby assist; perform perineal hygiene; dependent (less than 25% patient  effort)  -TA     Position (Toileting) supported standing  -TA           User Key  (r) = Recorded By, (t) = Taken By, (c) = Cosigned By    Initials Name Provider Type    Enrike Gonzalez OT Occupational Therapist               Obj/Interventions     Row Name 11/18/21 1124          Balance    Balance Assessment sitting dynamic balance; standing dynamic balance  -TA     Dynamic Sitting Balance WFL; sitting, edge of bed; sitting in chair  -TA     Dynamic Standing Balance WFL; supported; standing  -TA     Balance Interventions sit to stand; occupation based/functional task; weight shifting activity  -TA           User Key  (r) = Recorded By, (t) = Taken By, (c) = Cosigned By    Initials Name Provider Type    Enrike Gonzalez OT Occupational Therapist               Goals/Plan     Row Name 11/18/21 1124          Therapy Assessment/Plan (OT)    Planned Therapy Interventions (OT) activity tolerance training; BADL retraining; functional balance retraining; occupation/activity based interventions; patient/caregiver education/training; ROM/therapeutic exercise; strengthening exercise; transfer/mobility retraining  -TA           User Key  (r) = Recorded By, (t) = Taken By, (c) = Cosigned By    Initials Name Provider Type    Enrike Gonzalez, CHRIS Occupational Therapist               Clinical Impression     Row Name 11/18/21 1124          Pain Assessment    Additional Documentation Pain Scale: Numbers Pre/Post-Treatment (Group)  -TA     Row Name 11/18/21 1124          Pain Scale: Numbers Pre/Post-Treatment    Pretreatment Pain Rating 3/10  -TA     Posttreatment Pain Rating 1/10  -TA     Pain Location - Side Right  -TA     Pain Location flank  -TA     Pre/Posttreatment Pain Comment Pt tolerated, improved with rest  -TA     Pain Intervention(s) Ambulation/increased activity; Repositioned  -TA     Row Name 11/18/21 1124          Plan of Care Review    Plan of Care Reviewed With patient  -TA     Progress improving   -TA     Outcome Summary VSS; Pt progressing well with fxl mobility requiring SBA for bed and fxl mobility, set up/Mod Ind with all grooming, required assist with post toilet hygiene. Continue per OT POC. Recommend home with HHservices at discharge.  -TA     Row Name 11/18/21 1124          Therapy Assessment/Plan (OT)    Patient/Family Therapy Goal Statement (OT) Return home  -TA     Rehab Potential (OT) good, to achieve stated therapy goals  -TA     Criteria for Skilled Therapeutic Interventions Met (OT) yes; skilled treatment is necessary  -TA     Therapy Frequency (OT) daily  -TA     Predicted Duration of Therapy Intervention (OT) 3 days  -TA     Row Name 11/18/21 1124          Therapy Plan Review/Discharge Plan (OT)    Anticipated Discharge Disposition (OT) home; home with home health  -TA     Row Name 11/18/21 1124          Vital Signs    Pre Systolic BP Rehab --  VSS; RN cleared pt for tx  -TA     O2 Delivery Pre Treatment room air  -TA     O2 Delivery Intra Treatment room air  -TA     O2 Delivery Post Treatment room air  -TA     Pre Patient Position Supine  -TA     Intra Patient Position Standing  -TA     Post Patient Position Sitting  -TA     Row Name 11/18/21 1124          Positioning and Restraints    Pre-Treatment Position in bed  -TA     Post Treatment Position chair  -TA     In Chair notified nsg; reclined; call light within reach; encouraged to call for assist; exit alarm on; waffle cushion; legs elevated  -TA           User Key  (r) = Recorded By, (t) = Taken By, (c) = Cosigned By    Initials Name Provider Type    Enrike Gonzalez, OT Occupational Therapist               Outcome Measures     Row Name 11/18/21 1124          How much help from another is currently needed...    Putting on and taking off regular lower body clothing? 3  -TA     Bathing (including washing, rinsing, and drying) 3  -TA     Toileting (which includes using toilet bed pan or urinal) 3  -TA     Putting on and taking off  regular upper body clothing 3  -TA     Taking care of personal grooming (such as brushing teeth) 4  -TA     Eating meals 4  -TA     AM-PAC 6 Clicks Score (OT) 20  -TA     Row Name 11/18/21 1124          Functional Assessment    Outcome Measure Options AM-PAC 6 Clicks Daily Activity (OT)  -TA           User Key  (r) = Recorded By, (t) = Taken By, (c) = Cosigned By    Initials Name Provider Type    TA Enrike Rivera, OT Occupational Therapist                Occupational Therapy Education                 Title: PT OT SLP Therapies (In Progress)     Topic: Occupational Therapy (In Progress)     Point: ADL training (Done)     Description:   Instruct learner(s) on proper safety adaptation and remediation techniques during self care or transfers.   Instruct in proper use of assistive devices.              Learning Progress Summary           Patient Acceptance, E,TB,D, VU,DU,NR by  at 11/16/2021 1035                   Point: Home exercise program (Not Started)     Description:   Instruct learner(s) on appropriate technique for monitoring, assisting and/or progressing therapeutic exercises/activities.              Learner Progress:  Not documented in this visit.          Point: Precautions (Done)     Description:   Instruct learner(s) on prescribed precautions during self-care and functional transfers.              Learning Progress Summary           Patient Acceptance, E,TB,D, VU,DU,NR by  at 11/16/2021 1035                   Point: Body mechanics (Done)     Description:   Instruct learner(s) on proper positioning and spine alignment during self-care, functional mobility activities and/or exercises.              Learning Progress Summary           Patient Acceptance, E,TB,D, VU,DU,NR by  at 11/16/2021 1035                               User Key     Initials Effective Dates Name Provider Type [facility] 06/16/21 -  Laura Simpson OT Occupational Therapist OT              OT Recommendation and  Plan  Planned Therapy Interventions (OT): activity tolerance training, BADL retraining, functional balance retraining, occupation/activity based interventions, patient/caregiver education/training, ROM/therapeutic exercise, strengthening exercise, transfer/mobility retraining  Therapy Frequency (OT): daily  Plan of Care Review  Plan of Care Reviewed With: patient  Progress: improving  Outcome Summary: VSS; Pt progressing well with fxl mobility requiring SBA for bed and fxl mobility, set up/Mod Ind with all grooming, required assist with post toilet hygiene. Continue per OT POC. Recommend home with HHservices at discharge.     Time Calculation:    Time Calculation- OT     Row Name 11/18/21 1124             Time Calculation- OT    OT Start Time 1124  ttc 28 minutes  -TA      Total Timed Code Minutes- OT 28 minute(s)  -TA      OT Received On 11/18/21  -TA      OT Goal Re-Cert Due Date 11/26/21  -TA            User Key  (r) = Recorded By, (t) = Taken By, (c) = Cosigned By    Initials Name Provider Type    TA Enrike Rivera OT Occupational Therapist              Therapy Charges for Today     Code Description Service Date Service Provider Modifiers Qty    [id number]  OT SELF CARE/MGMT/TRAIN EA 15 MIN 11/18/2021 Enrike Rivera OT GO 1    [id number]  OT THERAPEUTIC ACT EA 15 MIN 11/18/2021 Enrike Rivera OT GO 1               Enrike Rivera OT  11/18/2021

## 2022-02-22 RX ORDER — CLOPIDOGREL BISULFATE 75 MG/1
75 TABLET ORAL DAILY
Qty: 90 TABLET | Refills: 3 | Status: SHIPPED | OUTPATIENT
Start: 2022-02-22 | End: 2023-02-10 | Stop reason: SDUPTHER

## 2022-03-01 RX ORDER — PRAVASTATIN SODIUM 20 MG
20 TABLET ORAL
Qty: 90 TABLET | Refills: 3 | Status: SHIPPED | OUTPATIENT
Start: 2022-03-01 | End: 2023-02-17 | Stop reason: SDUPTHER

## 2022-03-04 ENCOUNTER — OFFICE VISIT (OUTPATIENT)
Dept: CARDIOLOGY | Facility: CLINIC | Age: 72
End: 2022-03-04

## 2022-03-04 VITALS
HEART RATE: 73 BPM | OXYGEN SATURATION: 97 % | SYSTOLIC BLOOD PRESSURE: 126 MMHG | DIASTOLIC BLOOD PRESSURE: 88 MMHG | BODY MASS INDEX: 32.27 KG/M2 | WEIGHT: 189 LBS | HEIGHT: 64 IN

## 2022-03-04 DIAGNOSIS — I25.10 CORONARY ARTERY DISEASE INVOLVING NATIVE CORONARY ARTERY OF NATIVE HEART WITHOUT ANGINA PECTORIS: Primary | ICD-10-CM

## 2022-03-04 DIAGNOSIS — E78.2 MIXED HYPERLIPIDEMIA: ICD-10-CM

## 2022-03-04 DIAGNOSIS — I10 PRIMARY HYPERTENSION: ICD-10-CM

## 2022-03-04 PROCEDURE — 99214 OFFICE O/P EST MOD 30 MIN: CPT | Performed by: INTERNAL MEDICINE

## 2022-03-04 RX ORDER — LOSARTAN POTASSIUM 50 MG/1
50 TABLET ORAL DAILY
COMMUNITY

## 2022-03-04 RX ORDER — CEPHALEXIN 250 MG/1
250 CAPSULE ORAL DAILY
COMMUNITY
Start: 2022-02-15 | End: 2022-05-29

## 2022-03-04 NOTE — PROGRESS NOTES
[facility] Cardiology    Encounter Date: 2022    Patient ID: Nallely Mccabe is a 71 y.o. female.  : 1950     PCP: Shay Rivera MD       Chief Complaint: Coronary Artery Disease      PROBLEM LIST:  1. Coronary artery disease:  a. ER presentation on 10/18/2011 with ACS/non-ST  elevation MI.   b. LHC by Dr. Chand on 10/18/2011: Nonobstructive CAD with 30% plaque of the mid LAD, no other disease. Severe LV dysfunction with EF 25% and with diastolic dysfunction.   c. Echocardiogram, 2012: Normalization of LV systolic function, EF 50-55%.    d. MPS, 03/15/2013: Excellent exercise capacity with normal perfusion scan. EF 58%.    e. 24h Holter, 2014: NSR with occasional PACs and PVCs, no significant arrhythmias.    f. MPS, 2015, Kandy Chand MD, revealed no evidence of reversible ischemia and a small mild fixed apical defect consistent with soft  tissue attenuation artifact.  EF 77%.  2. Hypertension.  3. Hyperlipidemia.  4. PUD/GERD.   5. Anxiety/depression.   6. Obesity, BMI 33.  7. Surgical history:  a.  Eye surgery.   b. Sinus surgery.    History of Present Illness  Patient presents today for an annual follow-up with a history of coronary artery disease and cardiac risk factors. Since last visit, patient was hospitalized in 2021 with hyponatremia, suspected secondarily to HCTZ.  At this time her combo drug was discontinued and she was started on Coreg 12.5 twice daily.  Follow-up with her primary care, losartan 50 mg was added.  Since then, her BP has been much improved.  She notes she is still fatigued from her admission, and just finished physical therapy.  She denies chest pain, shortness of breath, palpitations, orthopnea or edema.    No Known Allergies      Current Outpatient Medications:   •  acetaminophen (TYLENOL) 500 MG tablet, Take 500 mg by mouth As Needed for mild pain (1-3)., Disp: , Rfl:   •  ALPRAZolam (XANAX) 1 MG tablet, Take 1 mg  by mouth 3 (Three) Times a Day As Needed., Disp: , Rfl: 0  •  aspirin  MG tablet, Take 325 mg by mouth Daily., Disp: , Rfl:   •  benzonatate (TESSALON) 100 MG capsule, Take 100 mg by mouth As Needed for Cough., Disp: , Rfl:   •  buPROPion XL (WELLBUTRIN XL) 300 MG 24 hr tablet, Take 300 mg by mouth Daily., Disp: , Rfl: 0  •  carboxymethylcellulose (REFRESH TEARS) 0.5 % solution, Administer 2 drops to both eyes 3 (Three) Times a Day As Needed for Dry Eyes., Disp: , Rfl:   •  carvedilol (COREG) 12.5 MG tablet, Take 1 tablet by mouth 2 (Two) Times a Day With Meals., Disp: 60 tablet, Rfl: 0  •  clopidogrel (PLAVIX) 75 MG tablet, Take 1 tablet by mouth Daily., Disp: 90 tablet, Rfl: 3  •  diclofenac (VOLTAREN) 1 % gel gel, Apply 4 g topically As Needed., Disp: , Rfl:   •  dicyclomine (BENTYL) 10 MG capsule, Take 10 mg by mouth As Needed., Disp: , Rfl: 0  •  estradiol (VAGIFEM) 10 MCG tablet vaginal tablet, Insert 1 tablet into the vagina 2 (Two) Times a Week., Disp: , Rfl:   •  fluticasone (FLONASE) 50 MCG/ACT nasal spray, 1 spray into the nostril(s) as directed by provider Daily., Disp: , Rfl: 0  •  lansoprazole (PREVACID) 30 MG capsule, TAKE 1 CAPSULE BY MOUTH TWICE DAILY 20 MINUTES BEFORE MEALS, Disp: , Rfl:   •  losartan (COZAAR) 50 MG tablet, Take 50 mg by mouth Daily., Disp: , Rfl:   •  meclizine (ANTIVERT) 25 MG tablet, Take 25 mg by mouth As Needed., Disp: , Rfl: 0  •  MEGARED OMEGA-3 KRILL  MG capsule, Take 1,000 mg by mouth Daily., Disp: , Rfl:   •  Multiple Vitamins-Minerals (PRESERVISION AREDS 2 PO), Take 1 tablet by mouth 2 (Two) Times a Day., Disp: , Rfl:   •  ondansetron (ZOFRAN) 4 MG tablet, take 1 tablet by mouth four times a day if needed for nausea, Disp: , Rfl: 0  •  pravastatin (PRAVACHOL) 20 MG tablet, Take 1 tablet by mouth every night at bedtime., Disp: 90 tablet, Rfl: 3  •  Wheat Dextrin (BENEFIBER PO), Take  by mouth., Disp: , Rfl:   •  cephalexin (KEFLEX) 250 MG capsule, Take 250 mg  "by mouth Daily., Disp: , Rfl:     The following portions of the patient's history were reviewed and updated as appropriate: allergies, current medications, past family history, past medical history, past social history, past surgical history and problem list.    ROS  Review of Systems   Constitution: Negative for chills, fever, fatigue, generalized weakness.   Cardiovascular: Negative for chest pain, dyspnea on exertion, leg swelling, palpitations, orthopnea, and syncope.   Respiratory: Negative for cough, shortness of breath, and wheezing.  HENT: Negative for ear pain, nosebleeds, and tinnitus.  Gastrointestinal: Negative for abdominal pain, constipation, diarrhea, nausea and vomiting.   Genitourinary: No urinary symptoms.  Musculoskeletal: Negative for muscle cramps.  Neurological: Negative for dizziness, headaches, loss of balance, numbness, and symptoms of stroke.  Psychiatric: Normal mental status.     All other systems reviewed and are negative.        Objective:     /88 (BP Location: Left arm, Patient Position: Sitting)   Pulse 73   Ht 162.6 cm (64\")   Wt 85.7 kg (189 lb)   SpO2 97%   BMI 32.44 kg/m²      Physical Exam  Constitutional: Patient appears well-developed and well-nourished.   HENT: HEENT exam unremarkable.   Neck: Neck supple. No JVD present. No carotid bruits.   Cardiovascular: Normal rate, regular rhythm and normal heart sounds. No murmur heard.   2+ symmetric pulses.   Pulmonary/Chest: Breath sounds normal. Does not exhibit tenderness.   Abdominal: Abdomen benign.   Musculoskeletal: Does not exhibit edema.   Neurological: Neurological exam unremarkable.   Vitals reviewed.    Data Review:   Lab Results   Component Value Date    GLUCOSE 89 11/18/2021    BUN 9 11/18/2021    CREATININE 1.01 (H) 11/18/2021    EGFRIFNONA 54 (L) 11/18/2021    BCR 8.9 11/18/2021    K 4.6 11/18/2021    CO2 23.0 11/18/2021    CALCIUM 8.9 11/18/2021    ALBUMIN 3.30 (L) 11/17/2021    AST 21 11/12/2021    ALT 16 " 11/12/2021       Lab Results   Component Value Date    WBC 5.79 11/14/2021    RBC 3.79 11/14/2021    HGB 11.1 (L) 11/14/2021    HCT 33.7 (L) 11/14/2021    MCV 88.9 11/14/2021     11/14/2021         Assessment:      Diagnosis Plan   1. Coronary artery disease involving native coronary artery of native heart without angina pectoris   stable without current angina.  Continue aspirin, Plavix, Coreg and losartan.   2. Primary hypertension   well-controlled, continue Coreg and losartan   3. Mixed hyperlipidemia   managed per PCP     Plan:   Patient is stable from a cardiac standpoint  Continue current medications.   FU in 6 MO, sooner as needed.  Thank you for allowing us to participate in the care of your patient.     Vijaya Wilson PA-C    Part of this note may be an electronic transcription/translation of spoken language to printed text using the Dragon Dictation System.

## 2022-05-29 PROCEDURE — 87086 URINE CULTURE/COLONY COUNT: CPT | Performed by: NURSE PRACTITIONER

## 2022-05-29 PROCEDURE — 87186 SC STD MICRODIL/AGAR DIL: CPT | Performed by: NURSE PRACTITIONER

## 2022-05-29 PROCEDURE — 87077 CULTURE AEROBIC IDENTIFY: CPT | Performed by: NURSE PRACTITIONER

## 2022-10-24 ENCOUNTER — TRANSCRIBE ORDERS (OUTPATIENT)
Dept: PREADMISSION TESTING | Facility: HOSPITAL | Age: 72
End: 2022-10-24

## 2022-10-24 ENCOUNTER — TRANSCRIBE ORDERS (OUTPATIENT)
Dept: ADMINISTRATIVE | Facility: HOSPITAL | Age: 72
End: 2022-10-24

## 2022-10-24 DIAGNOSIS — Z12.31 ENCOUNTER FOR SCREENING MAMMOGRAM FOR BREAST CANCER: Primary | ICD-10-CM

## 2022-10-28 ENCOUNTER — OFFICE VISIT (OUTPATIENT)
Dept: CARDIOLOGY | Facility: CLINIC | Age: 72
End: 2022-10-28

## 2022-10-28 ENCOUNTER — LAB (OUTPATIENT)
Dept: LAB | Facility: HOSPITAL | Age: 72
End: 2022-10-28

## 2022-10-28 VITALS
DIASTOLIC BLOOD PRESSURE: 80 MMHG | HEART RATE: 88 BPM | HEIGHT: 64 IN | SYSTOLIC BLOOD PRESSURE: 130 MMHG | BODY MASS INDEX: 30.97 KG/M2 | OXYGEN SATURATION: 96 % | WEIGHT: 181.4 LBS

## 2022-10-28 DIAGNOSIS — E78.2 MIXED HYPERLIPIDEMIA: ICD-10-CM

## 2022-10-28 DIAGNOSIS — I10 PRIMARY HYPERTENSION: ICD-10-CM

## 2022-10-28 DIAGNOSIS — I25.10 CORONARY ARTERY DISEASE INVOLVING NATIVE CORONARY ARTERY OF NATIVE HEART WITHOUT ANGINA PECTORIS: Primary | ICD-10-CM

## 2022-10-28 DIAGNOSIS — I25.10 CORONARY ARTERY DISEASE INVOLVING NATIVE CORONARY ARTERY OF NATIVE HEART WITHOUT ANGINA PECTORIS: ICD-10-CM

## 2022-10-28 PROCEDURE — 99214 OFFICE O/P EST MOD 30 MIN: CPT | Performed by: INTERNAL MEDICINE

## 2022-10-28 RX ORDER — CARVEDILOL 6.25 MG/1
6.25 TABLET ORAL 2 TIMES DAILY
COMMUNITY
Start: 2022-10-17

## 2022-10-29 LAB
ALBUMIN SERPL-MCNC: 4.3 G/DL (ref 3.5–5.2)
ALBUMIN/GLOB SERPL: 2 G/DL
ALP SERPL-CCNC: 101 U/L (ref 39–117)
ALT SERPL-CCNC: 16 U/L (ref 1–33)
AST SERPL-CCNC: 16 U/L (ref 1–32)
BILIRUB SERPL-MCNC: 0.3 MG/DL (ref 0–1.2)
BUN SERPL-MCNC: 11 MG/DL (ref 8–23)
BUN/CREAT SERPL: 10.3 (ref 7–25)
CALCIUM SERPL-MCNC: 9.5 MG/DL (ref 8.6–10.5)
CHLORIDE SERPL-SCNC: 102 MMOL/L (ref 98–107)
CHOLEST SERPL-MCNC: 141 MG/DL (ref 0–200)
CO2 SERPL-SCNC: 27.2 MMOL/L (ref 22–29)
CREAT SERPL-MCNC: 1.07 MG/DL (ref 0.57–1)
EGFRCR SERPLBLD CKD-EPI 2021: 54.6 ML/MIN/1.73
GLOBULIN SER CALC-MCNC: 2.1 GM/DL
GLUCOSE SERPL-MCNC: 98 MG/DL (ref 65–99)
HDLC SERPL-MCNC: 69 MG/DL (ref 40–60)
LDLC SERPL CALC-MCNC: 58 MG/DL (ref 0–100)
POTASSIUM SERPL-SCNC: 4.6 MMOL/L (ref 3.5–5.2)
PROT SERPL-MCNC: 6.4 G/DL (ref 6–8.5)
SODIUM SERPL-SCNC: 140 MMOL/L (ref 136–145)
TRIGL SERPL-MCNC: 70 MG/DL (ref 0–150)
VLDLC SERPL CALC-MCNC: 14 MG/DL (ref 5–40)

## 2022-11-07 ENCOUNTER — TELEPHONE (OUTPATIENT)
Dept: CARDIOLOGY | Facility: CLINIC | Age: 72
End: 2022-11-07

## 2022-11-07 NOTE — TELEPHONE ENCOUNTER
"Per AA \"Please notify her that labs showed excellent cholesterol profile and chemistries including liver function studies are normal.  Continue same treatment and keep scheduled appointment for future follow-up.    Patient notified. Verbalized understanding.    "

## 2022-12-02 ENCOUNTER — APPOINTMENT (OUTPATIENT)
Dept: MAMMOGRAPHY | Facility: HOSPITAL | Age: 72
End: 2022-12-02

## 2022-12-08 ENCOUNTER — APPOINTMENT (OUTPATIENT)
Dept: MAMMOGRAPHY | Facility: HOSPITAL | Age: 72
End: 2022-12-08

## 2023-01-19 ENCOUNTER — HOSPITAL ENCOUNTER (OUTPATIENT)
Dept: MAMMOGRAPHY | Facility: HOSPITAL | Age: 73
Discharge: HOME | End: 2023-01-19
Admitting: FAMILY MEDICINE
Payer: MEDICARE

## 2023-01-19 DIAGNOSIS — Z12.31 ENCOUNTER FOR SCREENING MAMMOGRAM FOR BREAST CANCER: ICD-10-CM

## 2023-01-19 PROCEDURE — 77067 SCR MAMMO BI INCL CAD: CPT

## 2023-01-19 PROCEDURE — 77067 SCR MAMMO BI INCL CAD: CPT | Performed by: RADIOLOGY

## 2023-01-19 PROCEDURE — 77063 BREAST TOMOSYNTHESIS BI: CPT | Performed by: RADIOLOGY

## 2023-01-19 PROCEDURE — 77063 BREAST TOMOSYNTHESIS BI: CPT

## 2023-02-10 ENCOUNTER — TELEPHONE (OUTPATIENT)
Dept: CARDIOLOGY | Facility: CLINIC | Age: 73
End: 2023-02-10
Payer: MEDICARE

## 2023-02-10 RX ORDER — CLOPIDOGREL BISULFATE 75 MG/1
75 TABLET ORAL DAILY
Qty: 90 TABLET | Refills: 3 | Status: SHIPPED | OUTPATIENT
Start: 2023-02-10

## 2023-02-17 RX ORDER — PRAVASTATIN SODIUM 20 MG
20 TABLET ORAL
Qty: 90 TABLET | Refills: 3 | Status: SHIPPED | OUTPATIENT
Start: 2023-02-17

## 2024-04-11 RX ORDER — CLOPIDOGREL BISULFATE 75 MG/1
75 TABLET ORAL DAILY
Qty: 90 TABLET | Refills: 3 | Status: SHIPPED | OUTPATIENT
Start: 2024-04-11

## 2024-09-09 RX ORDER — PRAVASTATIN SODIUM 20 MG
20 TABLET ORAL
Qty: 90 TABLET | Refills: 3 | OUTPATIENT
Start: 2024-09-09

## 2024-09-17 ENCOUNTER — TELEPHONE (OUTPATIENT)
Dept: CARDIOLOGY | Facility: CLINIC | Age: 74
End: 2024-09-17
Payer: MEDICARE

## 2024-09-17 DIAGNOSIS — E78.2 MIXED HYPERLIPIDEMIA: Primary | ICD-10-CM

## 2024-09-17 DIAGNOSIS — I25.10 CORONARY ARTERY DISEASE INVOLVING NATIVE CORONARY ARTERY OF NATIVE HEART WITHOUT ANGINA PECTORIS: ICD-10-CM

## 2024-10-04 ENCOUNTER — OFFICE VISIT (OUTPATIENT)
Dept: CARDIOLOGY | Facility: CLINIC | Age: 74
End: 2024-10-04
Payer: MEDICARE

## 2024-10-04 ENCOUNTER — LAB (OUTPATIENT)
Dept: LAB | Facility: HOSPITAL | Age: 74
End: 2024-10-04
Payer: MEDICARE

## 2024-10-04 VITALS
BODY MASS INDEX: 29.37 KG/M2 | DIASTOLIC BLOOD PRESSURE: 82 MMHG | HEIGHT: 64 IN | OXYGEN SATURATION: 93 % | HEART RATE: 65 BPM | WEIGHT: 172 LBS | SYSTOLIC BLOOD PRESSURE: 132 MMHG

## 2024-10-04 DIAGNOSIS — I25.10 CORONARY ARTERY DISEASE INVOLVING NATIVE CORONARY ARTERY OF NATIVE HEART WITHOUT ANGINA PECTORIS: Primary | ICD-10-CM

## 2024-10-04 DIAGNOSIS — E78.2 MIXED HYPERLIPIDEMIA: ICD-10-CM

## 2024-10-04 DIAGNOSIS — I25.10 CORONARY ARTERY DISEASE INVOLVING NATIVE CORONARY ARTERY OF NATIVE HEART WITHOUT ANGINA PECTORIS: ICD-10-CM

## 2024-10-04 DIAGNOSIS — E78.5 DYSLIPIDEMIA: ICD-10-CM

## 2024-10-04 DIAGNOSIS — I10 ESSENTIAL HYPERTENSION: ICD-10-CM

## 2024-10-04 LAB
ALBUMIN SERPL-MCNC: 3.9 G/DL (ref 3.5–5.2)
ALBUMIN/GLOB SERPL: 1.6 G/DL
ALP SERPL-CCNC: 110 U/L (ref 39–117)
ALT SERPL W P-5'-P-CCNC: 6 U/L (ref 1–33)
ANION GAP SERPL CALCULATED.3IONS-SCNC: 9.6 MMOL/L (ref 5–15)
AST SERPL-CCNC: 15 U/L (ref 1–32)
BILIRUB SERPL-MCNC: 0.4 MG/DL (ref 0–1.2)
BUN SERPL-MCNC: 11 MG/DL (ref 8–23)
BUN/CREAT SERPL: 10.2 (ref 7–25)
CALCIUM SPEC-SCNC: 8.7 MG/DL (ref 8.6–10.5)
CHLORIDE SERPL-SCNC: 98 MMOL/L (ref 98–107)
CHOLEST SERPL-MCNC: 166 MG/DL (ref 0–200)
CO2 SERPL-SCNC: 28.4 MMOL/L (ref 22–29)
CREAT SERPL-MCNC: 1.08 MG/DL (ref 0.57–1)
EGFRCR SERPLBLD CKD-EPI 2021: 53.3 ML/MIN/1.73
GLOBULIN UR ELPH-MCNC: 2.5 GM/DL
GLUCOSE SERPL-MCNC: 99 MG/DL (ref 65–99)
HDLC SERPL-MCNC: 98 MG/DL (ref 40–60)
LDLC SERPL CALC-MCNC: 55 MG/DL (ref 0–100)
LDLC/HDLC SERPL: 0.55 {RATIO}
POTASSIUM SERPL-SCNC: 4.7 MMOL/L (ref 3.5–5.2)
PROT SERPL-MCNC: 6.4 G/DL (ref 6–8.5)
SODIUM SERPL-SCNC: 136 MMOL/L (ref 136–145)
TRIGL SERPL-MCNC: 69 MG/DL (ref 0–150)
VLDLC SERPL-MCNC: 13 MG/DL (ref 5–40)

## 2024-10-04 PROCEDURE — 99214 OFFICE O/P EST MOD 30 MIN: CPT | Performed by: INTERNAL MEDICINE

## 2024-10-04 PROCEDURE — 1160F RVW MEDS BY RX/DR IN RCRD: CPT | Performed by: INTERNAL MEDICINE

## 2024-10-04 PROCEDURE — 3079F DIAST BP 80-89 MM HG: CPT | Performed by: INTERNAL MEDICINE

## 2024-10-04 PROCEDURE — 36415 COLL VENOUS BLD VENIPUNCTURE: CPT

## 2024-10-04 PROCEDURE — 80053 COMPREHEN METABOLIC PANEL: CPT

## 2024-10-04 PROCEDURE — 80061 LIPID PANEL: CPT

## 2024-10-04 PROCEDURE — 1159F MED LIST DOCD IN RCRD: CPT | Performed by: INTERNAL MEDICINE

## 2024-10-04 PROCEDURE — 3075F SYST BP GE 130 - 139MM HG: CPT | Performed by: INTERNAL MEDICINE

## 2024-10-04 RX ORDER — METHENAMINE HIPPURATE 1000 MG/1
1 TABLET ORAL EVERY 12 HOURS SCHEDULED
COMMUNITY
Start: 2024-08-24

## 2024-10-04 RX ORDER — CARVEDILOL 12.5 MG/1
12.5 TABLET ORAL 2 TIMES DAILY WITH MEALS
COMMUNITY

## 2024-10-04 RX ORDER — ASPIRIN 81 MG/1
81 TABLET ORAL DAILY
Start: 2024-10-04

## 2024-10-04 RX ORDER — PRAVASTATIN SODIUM 20 MG
20 TABLET ORAL
Qty: 90 TABLET | Refills: 3 | Status: SHIPPED | OUTPATIENT
Start: 2024-10-04

## 2024-10-04 NOTE — PROGRESS NOTES
[facility] Cardiology    Encounter Date: 10/04/2024    Patient ID: Nallely Mccabe is a 74 y.o. female.  : 1950     PCP: Shay Rivera MD       Chief Complaint: Coronary Artery Disease      PROBLEM LIST:  Coronary artery disease:  ER presentation, 10/18/2011 with ACS/non-ST  elevation MI.   C by Dr. Chand, 10/18/2011: Nonobstructive CAD with 30% plaque of the mid LAD, no other disease. Severe LV dysfunction with EF 25% and with diastolic dysfunction.   Echo, 2012: Normalization of LV systolic function, EF 50-55%.    MPS, 03/15/2013: Excellent exercise capacity with normal perfusion scan. EF 58%.    24h Holter, 2014: NSR with occasional PACs and PVCs, no significant arrhythmias.    MPS, 2015, Kandy Chand MD, revealed no evidence of reversible ischemia and a small mild fixed apical defect consistent with soft  tissue attenuation artifact.  EF 77%.  Hypertension.  Hyperlipidemia.  PUD/GERD.   Anxiety/depression.   Obesity, BMI 33.  Surgical history:   Eye surgery.   Sinus surgery.    History of Present Illness  Patient presents today for a follow-up with a history of CAD and cardiac risk factors. Since last visit, the patient has done well from cardiac standpoint.  No interim ER visits or hospitalizations.  States that her blood pressure was running high her nephrologist has adjusted her medications and blood pressure is now better controlled.  Denies chest pain shortness of breath edema palpitations dizziness or syncope.  Lifestyle is sedentary but has no complaints with usual activities and short distance walking.  No Known Allergies      Current Outpatient Medications:     acetaminophen (TYLENOL) 500 MG tablet, Take 1 tablet by mouth As Needed for Mild Pain., Disp: , Rfl:     ALPRAZolam (XANAX) 1 MG tablet, Take 1 tablet by mouth 3 (Three) Times a Day As Needed., Disp: , Rfl: 0    benzonatate (TESSALON) 100 MG capsule, Take 1 capsule by mouth As Needed for  Cough., Disp: , Rfl:     buPROPion XL (WELLBUTRIN XL) 300 MG 24 hr tablet, Take 1 tablet by mouth Daily., Disp: , Rfl: 0    carboxymethylcellulose (REFRESH PLUS) 0.5 % solution, Administer 2 drops to both eyes 3 (Three) Times a Day As Needed for Dry Eyes., Disp: , Rfl:     carvedilol (COREG) 12.5 MG tablet, Take 1 tablet by mouth 2 (Two) Times a Day With Meals., Disp: , Rfl:     clopidogrel (PLAVIX) 75 MG tablet, Take 1 tablet by mouth Daily., Disp: 90 tablet, Rfl: 3    diclofenac (VOLTAREN) 1 % gel gel, Apply 4 g topically As Needed., Disp: , Rfl:     dicyclomine (BENTYL) 10 MG capsule, Take 1 capsule by mouth As Needed., Disp: , Rfl: 0    fluticasone (FLONASE) 50 MCG/ACT nasal spray, Administer 1 spray into the nostril(s) as directed by provider Daily., Disp: , Rfl: 0    lansoprazole (PREVACID) 30 MG capsule, TAKE 1 CAPSULE BY MOUTH TWICE DAILY 20 MINUTES BEFORE MEALS, Disp: , Rfl:     losartan (COZAAR) 50 MG tablet, Take 2 tablets by mouth Daily., Disp: , Rfl:     MEGARED OMEGA-3 KRILL  MG capsule, Take 1,000 mg by mouth Daily., Disp: , Rfl:     methenamine (HIPREX) 1 g tablet, Take 1 tablet by mouth Every 12 (Twelve) Hours., Disp: , Rfl:     Multiple Vitamins-Minerals (PRESERVISION AREDS 2 PO), Take 1 tablet by mouth 2 (Two) Times a Day., Disp: , Rfl:     ondansetron (ZOFRAN) 4 MG tablet, take 1 tablet by mouth four times a day if needed for nausea, Disp: , Rfl: 0    Polyethylene Glycol 3350 (MIRALAX PO), , Disp: , Rfl:     pravastatin (PRAVACHOL) 20 MG tablet, Take 1 tablet by mouth every night at bedtime., Disp: 90 tablet, Rfl: 3    sertraline (ZOLOFT) 50 MG tablet, Take 1.5 tablets by mouth Daily., Disp: , Rfl:     aspirin 81 MG EC tablet, Take 1 tablet by mouth Daily., Disp: , Rfl:     The following portions of the patient's history were reviewed and updated as appropriate: allergies, current medications, past family history, past medical history, past social history, past surgical history and problem  "list.    ROS  Review of Systems   14 point ROS negative except for that listed in the HPI.         Objective:     /82 (BP Location: Left arm, Patient Position: Sitting)   Pulse 65   Ht 162.6 cm (64\")   Wt 78 kg (172 lb)   SpO2 93%   BMI 29.52 kg/m²      Physical Exam  Constitutional: Patient appears well-developed and well-nourished.   HENT: HEENT exam unremarkable.   Neck: Neck supple. No JVD present. No carotid bruits.   Cardiovascular: Normal rate, regular rhythm and normal heart sounds. No murmur heard.   2+ symmetric pulses.   Pulmonary/Chest: Breath sounds normal. Does not exhibit tenderness.   Abdominal: Abdomen benign.   Musculoskeletal: Does not exhibit edema.   Neurological: Neurological exam unremarkable.   Vitals reviewed.    Data Review:   No recent laboratory studies available for review today.     Procedures       Advance Care Planning   ACP discussion was declined by the patient. Patient does not have an advance directive, declines further assistance.           Assessment:      Diagnosis Plan   1. Coronary artery disease involving native coronary artery of native heart without angina pectoris  Stable, reduce aspirin dose to 81 mg daily, continue Plavix and rest of the current treatment.      2. Essential hypertension  Well-controlled, continue carvedilol and losartan at current dose.      3. Dyslipidemia  Continue current statin therapy, check lipid panel and CMP.        Plan:   Stable cardiac status.  No angina or CHF symptoms with fair exercise capacity.  Check lipid panel and CMP.  Continue current medications.   FU in 12 MO, sooner as needed.  Thank you for allowing us to participate in the care of your patient.     Kandy Chand MD, FACC, [facility]      Please note that portions of this note may have been completed with a voice recognition program. Efforts were made to edit the dictations, but occasionally words are mistranscribed.      "

## 2024-10-20 ENCOUNTER — APPOINTMENT (OUTPATIENT)
Dept: GENERAL RADIOLOGY | Facility: HOSPITAL | Age: 74
End: 2024-10-20
Payer: MEDICARE

## 2024-10-20 ENCOUNTER — HOSPITAL ENCOUNTER (EMERGENCY)
Facility: HOSPITAL | Age: 74
Discharge: HOME | End: 2024-10-20
Attending: STUDENT IN AN ORGANIZED HEALTH CARE EDUCATION/TRAINING PROGRAM
Payer: MEDICARE

## 2024-10-20 ENCOUNTER — HOSPITAL ENCOUNTER (OUTPATIENT)
Dept: GENERAL RADIOLOGY | Facility: HOSPITAL | Age: 74
Discharge: HOME | End: 2024-10-20
Payer: MEDICARE

## 2024-10-20 VITALS
BODY MASS INDEX: 29.19 KG/M2 | RESPIRATION RATE: 16 BRPM | HEART RATE: 106 BPM | DIASTOLIC BLOOD PRESSURE: 91 MMHG | WEIGHT: 171 LBS | SYSTOLIC BLOOD PRESSURE: 133 MMHG | TEMPERATURE: 97.5 F | HEIGHT: 64 IN | OXYGEN SATURATION: 98 %

## 2024-10-20 DIAGNOSIS — S82.842A CLOSED BIMALLEOLAR FRACTURE OF LEFT ANKLE, INITIAL ENCOUNTER: Primary | ICD-10-CM

## 2024-10-20 PROCEDURE — 73630 X-RAY EXAM OF FOOT: CPT

## 2024-10-20 PROCEDURE — 73610 X-RAY EXAM OF ANKLE: CPT

## 2024-10-20 PROCEDURE — 99283 EMERGENCY DEPT VISIT LOW MDM: CPT

## 2024-10-20 RX ORDER — HYDROCODONE BITARTRATE AND ACETAMINOPHEN 5; 325 MG/1; MG/1
1 TABLET ORAL EVERY 6 HOURS PRN
Qty: 12 TABLET | Refills: 0 | Status: ON HOLD | OUTPATIENT
Start: 2024-10-20

## 2024-10-20 RX ORDER — IBUPROFEN 600 MG/1
600 TABLET, FILM COATED ORAL EVERY 6 HOURS PRN
Qty: 20 TABLET | Refills: 0 | Status: ON HOLD | OUTPATIENT
Start: 2024-10-20

## 2024-10-20 NOTE — ED PROVIDER NOTES
Subjective   History of Present Illness  74-year-old female presents emergency department today with left ankle injury.  Apparently last night she was out on her front patio when she twisted the left ankle.  She states that she is able to bear some weight on it but is been very painful and she noted quite a bit of bruising.  Family saw her today and I brought her to the emergency department.  She reports she is having some pain with movement or weightbearing but otherwise is not exquisitely tender.  She had no other injuries.  She has a bruise to her left side of her face which she states is from a fall couple weeks ago where she had to have some staples placed in her scalp.    History provided by:  Patient   used: No    Ankle Injury  Location:  Left ankle  Quality:  Tender  Severity:  Mild  Onset quality:  Sudden  Duration:  12 hours  Timing:  Constant  Progression:  Unchanged  Chronicity:  New  Context:  Inverted left ankle last night  Relieved by:  Rest  Worsened by:  Movement  Associated symptoms: no abdominal pain, no cough, no fever, no loss of consciousness, no rhinorrhea and no sore throat        Review of Systems   Constitutional:  Negative for fever.   HENT:  Negative for rhinorrhea and sore throat.    Respiratory:  Negative for cough and chest tightness.    Gastrointestinal:  Negative for abdominal pain.   Neurological:  Negative for loss of consciousness.       Past Medical History:   Diagnosis Date    Anxiety     CAD (coronary artery disease)     Depression     GERD (gastroesophageal reflux disease)     Hyperlipidemia     Hypertension     Myocardial infarction 10/18/2011    Obesity (BMI 30.0-34.9)     Pyelonephritis     Pyelonephritis, acute 05/24/2018    Right sided    Sepsis     Sepsis 05/23/2018    UTI (urinary tract infection)        No Known Allergies    Past Surgical History:   Procedure Laterality Date    CARDIAC CATHETERIZATION  10/18/2011    CORONARY ANGIOPLASTY  10/18/2011     EYE SURGERY      ORIF HUMERUS FRACTURE      SINUS SURGERY         Family History   Problem Relation Age of Onset    Diabetes Mother     Kidney failure Mother     Anemia Mother     Arrhythmia Mother     Asthma Mother     Heart failure Mother     Hypertension Mother     Heart attack Father         rheumatic heart disease    Arthritis Sister     Hypertension Sister     Blindness Sister     Heart attack Maternal Grandmother     Heart failure Maternal Grandfather     Asthma Maternal Aunt     Asthma Maternal Uncle     Hypertension Sister     Breast cancer Neg Hx     Ovarian cancer Neg Hx        Social History     Socioeconomic History    Marital status:    Tobacco Use    Smoking status: Never    Smokeless tobacco: Never    Tobacco comments:     second hand smoke   Vaping Use    Vaping status: Never Used    Passive vaping exposure: Yes   Substance and Sexual Activity    Alcohol use: No    Drug use: Never    Sexual activity: Not Currently     Partners: Male     Birth control/protection: Pill     Comment: birth control pills/ vascestomy           Objective   Physical Exam  Vitals and nursing note reviewed.   Constitutional:       General: She is not in acute distress.     Appearance: She is well-developed. She is not diaphoretic.   HENT:      Head: Normocephalic and atraumatic.      Nose: Nose normal.   Eyes:      General: No scleral icterus.     Conjunctiva/sclera: Conjunctivae normal.   Pulmonary:      Effort: Pulmonary effort is normal. No respiratory distress.   Musculoskeletal:         General: Normal range of motion.      Cervical back: Normal range of motion and neck supple.      Comments: Left lower extremity quite a bit of ecchymosis on the dorsal lateral aspect of the foot and ankle.  No tenderness of the fibular head no tenderness of the base of the fifth metatarsal.  Diffuse tenderness of the lateral malleolus.  Joint does appear to be unstable.  Cap refill less than 2 seconds sensations intact.   No open wounds.   Skin:     General: Skin is warm and dry.   Neurological:      Mental Status: She is alert and oriented to person, place, and time.   Psychiatric:         Behavior: Behavior normal.         Procedures           ED Course  ED Course as of 10/20/24 1730   Sun Oct 20, 2024   1729 Discussed with Dr. Hopson.  She will be placed  in a posterior short leg splint with stirrup.  Follow-up in their office. [OCTAVIO]   1729 X-rays do reveal minimally displaced bimalleolar fracture [OCTAVIO]      ED Course User Index  [OCTAVIO] Mike Hodges PA                                 No results found for this or any previous visit (from the past 24 hours).  Note: In addition to lab results from this visit, the labs listed above may include labs taken at another facility or during a different encounter within the last 24 hours. Please correlate lab times with ED admission and discharge times for further clarification of the services performed during this visit.    XR Foot 3+ View Right   Final Result   Impression:   1. Obliquely oriented mildly displaced fracture through the distal fibula starting above the level of the talar dome and extending inferiorly to the level of the ankle mortise.   2. Minimally displaced medial malleolus fracture.   3. Mild medial widening of the ankle mortise.   4. Degenerative joint disease at the first metatarsophalangeal joint.      Electronically Signed: Tyler Handley     10/20/2024 3:57 PM EDT     Workstation ID: [id number]      XR Ankle 3+ View Right   Final Result   Impression:   1. Obliquely oriented mildly displaced fracture through the distal fibula starting above the level of the talar dome and extending inferiorly to the level of the ankle mortise.   2. Minimally displaced medial malleolus fracture.   3. Mild medial widening of the ankle mortise.   4. Degenerative joint disease at the first metatarsophalangeal joint.      Electronically Signed: Tyler Handley     10/20/2024 3:57 PM EDT  "    Workstation ID: [id number]        Vitals:    10/20/24 1433   BP: 133/91   BP Location: Left arm   Patient Position: Sitting   Pulse: 106   Resp: 16   Temp: 97.5 °F (36.4 °C)   TempSrc: Oral   SpO2: 98%   Weight: 77.6 kg (171 lb)   Height: 162.6 cm (64\")     Medications - No data to display  ECG/EMG Results (last 24 hours)       ** No results found for the last 24 hours. **          No orders to display           BILLY reviewed by Fito Mcintyre MD       Medical Decision Making      Final diagnoses:   Closed bimalleolar fracture of left ankle, initial encounter       ED Disposition  ED Disposition       ED Disposition   Discharge    Condition   Stable    Comment   --               Chapin Hopson MD  [address] [id number] [phone]               Medication List        New Prescriptions      HYDROcodone-acetaminophen 5-325 MG per tablet  Commonly known as: NORCO  Take 1 tablet by mouth Every 6 (Six) Hours As Needed for Severe Pain.     ibuprofen 600 MG tablet  Commonly known as: ADVIL,MOTRIN  Take 1 tablet by mouth Every 6 (Six) Hours As Needed for Mild Pain.     naloxone 4 MG/0.1ML nasal spray  Commonly known as: NARCAN  Call 911. Don't prime. [place] in 1 nostril for overdose. Repeat in 2-3 minutes in other nostril if no or minimal breathing/responsiveness.               Where to Get Your Medications        These medications were sent to [id number] - [place], [place] - [address] AT [facility] [address] - [phone] [facility] [phone]   [address], [facility] [address]      Phone: [phone]   HYDROcodone-acetaminophen 5-325 MG per tablet  ibuprofen 600 MG tablet  naloxone 4 MG/0.1ML nasal spray            Mike Hodges PA  10/23/24 0714    "

## 2024-10-21 ENCOUNTER — PATIENT ROUNDING (BHMG ONLY) (OUTPATIENT)
Dept: URGENT CARE | Facility: CLINIC | Age: 74
End: 2024-10-21
Payer: MEDICARE

## 2024-10-24 ENCOUNTER — PRE-ADMISSION TESTING (OUTPATIENT)
Dept: PREADMISSION TESTING | Facility: HOSPITAL | Age: 74
End: 2024-10-24
Payer: MEDICARE

## 2024-10-24 LAB
ALBUMIN SERPL-MCNC: 3.5 G/DL (ref 3.5–5.2)
ALBUMIN/GLOB SERPL: 1.3 G/DL
ALP SERPL-CCNC: 86 U/L (ref 39–117)
ALT SERPL W P-5'-P-CCNC: 8 U/L (ref 1–33)
ANION GAP SERPL CALCULATED.3IONS-SCNC: 13 MMOL/L (ref 5–15)
APTT PPP: 29.6 SECONDS (ref 22–39)
AST SERPL-CCNC: 16 U/L (ref 1–32)
BASOPHILS # BLD AUTO: 0.08 10*3/MM3 (ref 0–0.2)
BASOPHILS NFR BLD AUTO: 1 % (ref 0–1.5)
BILIRUB SERPL-MCNC: 0.5 MG/DL (ref 0–1.2)
BUN SERPL-MCNC: 15 MG/DL (ref 8–23)
BUN/CREAT SERPL: 12.1 (ref 7–25)
CALCIUM SPEC-SCNC: 8.7 MG/DL (ref 8.6–10.5)
CHLORIDE SERPL-SCNC: 102 MMOL/L (ref 98–107)
CO2 SERPL-SCNC: 23 MMOL/L (ref 22–29)
CREAT SERPL-MCNC: 1.24 MG/DL (ref 0.57–1)
DEPRECATED RDW RBC AUTO: 62.1 FL (ref 37–54)
EGFRCR SERPLBLD CKD-EPI 2021: 45.2 ML/MIN/1.73
EOSINOPHIL # BLD AUTO: 0.16 10*3/MM3 (ref 0–0.4)
EOSINOPHIL NFR BLD AUTO: 1.9 % (ref 0.3–6.2)
ERYTHROCYTE [DISTWIDTH] IN BLOOD BY AUTOMATED COUNT: 16.8 % (ref 12.3–15.4)
GLOBULIN UR ELPH-MCNC: 2.8 GM/DL
GLUCOSE SERPL-MCNC: 121 MG/DL (ref 65–99)
HBA1C MFR BLD: 4.4 % (ref 4.8–5.6)
HCT VFR BLD AUTO: 35.5 % (ref 34–46.6)
HGB BLD-MCNC: 11.5 G/DL (ref 12–15.9)
IMM GRANULOCYTES # BLD AUTO: 0.03 10*3/MM3 (ref 0–0.05)
IMM GRANULOCYTES NFR BLD AUTO: 0.4 % (ref 0–0.5)
INR PPP: 0.94 (ref 0.89–1.12)
LYMPHOCYTES # BLD AUTO: 1.52 10*3/MM3 (ref 0.7–3.1)
LYMPHOCYTES NFR BLD AUTO: 18.2 % (ref 19.6–45.3)
MCH RBC QN AUTO: 32.7 PG (ref 26.6–33)
MCHC RBC AUTO-ENTMCNC: 32.4 G/DL (ref 31.5–35.7)
MCV RBC AUTO: 100.9 FL (ref 79–97)
MONOCYTES # BLD AUTO: 0.84 10*3/MM3 (ref 0.1–0.9)
MONOCYTES NFR BLD AUTO: 10.1 % (ref 5–12)
NEUTROPHILS NFR BLD AUTO: 5.71 10*3/MM3 (ref 1.7–7)
NEUTROPHILS NFR BLD AUTO: 68.4 % (ref 42.7–76)
NRBC BLD AUTO-RTO: 0 /100 WBC (ref 0–0.2)
PLATELET # BLD AUTO: 357 10*3/MM3 (ref 140–450)
PMV BLD AUTO: 9.8 FL (ref 6–12)
POTASSIUM SERPL-SCNC: 4.5 MMOL/L (ref 3.5–5.2)
PROT SERPL-MCNC: 6.3 G/DL (ref 6–8.5)
PROTHROMBIN TIME: 12.7 SECONDS (ref 12.2–14.5)
RBC # BLD AUTO: 3.52 10*6/MM3 (ref 3.77–5.28)
SODIUM SERPL-SCNC: 138 MMOL/L (ref 136–145)
WBC NRBC COR # BLD AUTO: 8.34 10*3/MM3 (ref 3.4–10.8)

## 2024-10-24 PROCEDURE — 85610 PROTHROMBIN TIME: CPT

## 2024-10-24 PROCEDURE — 85025 COMPLETE CBC W/AUTO DIFF WBC: CPT

## 2024-10-24 PROCEDURE — 85730 THROMBOPLASTIN TIME PARTIAL: CPT

## 2024-10-24 PROCEDURE — 83036 HEMOGLOBIN GLYCOSYLATED A1C: CPT

## 2024-10-24 PROCEDURE — 80053 COMPREHEN METABOLIC PANEL: CPT

## 2024-10-24 PROCEDURE — 36415 COLL VENOUS BLD VENIPUNCTURE: CPT

## 2024-10-24 NOTE — PAT
Patient to apply Chlorhexadine wipes  to surgical area (as instructed) the night before procedure and the AM of procedure. Wipes provided.   Patient instructed to drink 20 ounces of Gatorade or Gatorlyte (if diabetic) and it needs to be completed 1 hour (for Main OR patients) or 2 hours (scheduled  section & [facility] patients) before given arrival time for procedure (NO RED Gatorade and NO Gatorade Zero).    Patient verbalized understanding.

## 2024-10-25 ENCOUNTER — ANESTHESIA EVENT (OUTPATIENT)
Dept: PERIOP | Facility: HOSPITAL | Age: 74
End: 2024-10-25
Payer: MEDICARE

## 2024-10-25 ENCOUNTER — ANESTHESIA EVENT CONVERTED (OUTPATIENT)
Dept: ANESTHESIOLOGY | Facility: HOSPITAL | Age: 74
End: 2024-10-25
Payer: MEDICARE

## 2024-10-25 ENCOUNTER — APPOINTMENT (OUTPATIENT)
Dept: GENERAL RADIOLOGY | Facility: HOSPITAL | Age: 74
End: 2024-10-25
Payer: MEDICARE

## 2024-10-25 ENCOUNTER — HOSPITAL ENCOUNTER (OUTPATIENT)
Facility: HOSPITAL | Age: 74
Discharge: NURSING, REHAB OR LONG-TERM CARE | End: 2024-10-29
Attending: ORTHOPAEDIC SURGERY | Admitting: ORTHOPAEDIC SURGERY
Payer: MEDICARE

## 2024-10-25 ENCOUNTER — ANESTHESIA (OUTPATIENT)
Dept: PERIOP | Facility: HOSPITAL | Age: 74
End: 2024-10-25
Payer: MEDICARE

## 2024-10-25 DIAGNOSIS — Z87.81 S/P ORIF (OPEN REDUCTION INTERNAL FIXATION) FRACTURE: Primary | ICD-10-CM

## 2024-10-25 DIAGNOSIS — Z98.890 S/P ORIF (OPEN REDUCTION INTERNAL FIXATION) FRACTURE: Primary | ICD-10-CM

## 2024-10-25 PROBLEM — S82.842A ANKLE FRACTURE, BIMALLEOLAR, CLOSED, LEFT, INITIAL ENCOUNTER: Status: ACTIVE | Noted: 2024-10-25

## 2024-10-25 PROBLEM — N18.30 CKD (CHRONIC KIDNEY DISEASE) STAGE 3, GFR 30-59 ML/MIN: Status: ACTIVE | Noted: 2024-10-25

## 2024-10-25 PROCEDURE — C1889 IMPLANT/INSERT DEVICE, NOC: HCPCS | Performed by: ORTHOPAEDIC SURGERY

## 2024-10-25 PROCEDURE — 25010000002 ONDANSETRON PER 1 MG

## 2024-10-25 PROCEDURE — G0378 HOSPITAL OBSERVATION PER HR: HCPCS

## 2024-10-25 PROCEDURE — 25010000002 FENTANYL CITRATE (PF) 50 MCG/ML SOLUTION: Performed by: NURSE ANESTHETIST, CERTIFIED REGISTERED

## 2024-10-25 PROCEDURE — 25010000002 LIDOCAINE PF 1% 1 % SOLUTION

## 2024-10-25 PROCEDURE — 25010000002 CEFAZOLIN PER 500 MG: Performed by: ORTHOPAEDIC SURGERY

## 2024-10-25 PROCEDURE — 25010000002 LIDOCAINE PF 1% 1 % SOLUTION: Performed by: ANESTHESIOLOGY

## 2024-10-25 PROCEDURE — 25010000002 BUPIVACAINE (PF) 0.25 % SOLUTION: Performed by: NURSE ANESTHETIST, CERTIFIED REGISTERED

## 2024-10-25 PROCEDURE — C1713 ANCHOR/SCREW BN/BN,TIS/BN: HCPCS | Performed by: ORTHOPAEDIC SURGERY

## 2024-10-25 PROCEDURE — C1769 GUIDE WIRE: HCPCS | Performed by: ORTHOPAEDIC SURGERY

## 2024-10-25 PROCEDURE — 76000 FLUOROSCOPY <1 HR PHYS/QHP: CPT

## 2024-10-25 PROCEDURE — 25010000002 PROPOFOL 10 MG/ML EMULSION

## 2024-10-25 PROCEDURE — 25810000003 LACTATED RINGERS PER 1000 ML: Performed by: ANESTHESIOLOGY

## 2024-10-25 PROCEDURE — 25010000002 ROPIVACAINE HCL-NACL 0.2-0.9 % SOLUTION: Performed by: NURSE ANESTHETIST, CERTIFIED REGISTERED

## 2024-10-25 PROCEDURE — 25010000002 DEXAMETHASONE PER 1 MG

## 2024-10-25 DEVICE — FW,BPB #2 SUTR,BLU W/NDL
Type: IMPLANTABLE DEVICE | Site: ANKLE | Status: FUNCTIONAL
Brand: ARTHREX®

## 2024-10-25 DEVICE — IMPLANTABLE DEVICE: Type: IMPLANTABLE DEVICE | Site: ANKLE | Status: FUNCTIONAL

## 2024-10-25 DEVICE — FIBULOCK IMPLANT SYSTEM, STERILE
Type: IMPLANTABLE DEVICE | Site: ANKLE | Status: FUNCTIONAL
Brand: ARTHREX®

## 2024-10-25 DEVICE — 3.8X130MM FIBULA NAIL, LEFT
Type: IMPLANTABLE DEVICE | Site: ANKLE | Status: FUNCTIONAL
Brand: ARTHREX®

## 2024-10-25 RX ORDER — MAGNESIUM HYDROXIDE 1200 MG/15ML
LIQUID ORAL AS NEEDED
Status: DISCONTINUED | OUTPATIENT
Start: 2024-10-25 | End: 2024-10-25 | Stop reason: HOSPADM

## 2024-10-25 RX ORDER — LIDOCAINE HYDROCHLORIDE 10 MG/ML
INJECTION, SOLUTION EPIDURAL; INFILTRATION; INTRACAUDAL; PERINEURAL AS NEEDED
Status: DISCONTINUED | OUTPATIENT
Start: 2024-10-25 | End: 2024-10-25 | Stop reason: SURG

## 2024-10-25 RX ORDER — FAMOTIDINE 20 MG/1
20 TABLET, FILM COATED ORAL
Status: COMPLETED | OUTPATIENT
Start: 2024-10-25 | End: 2024-10-25

## 2024-10-25 RX ORDER — ACETAMINOPHEN 650 MG/1
650 SUPPOSITORY RECTAL EVERY 4 HOURS PRN
Status: DISCONTINUED | OUTPATIENT
Start: 2024-10-25 | End: 2024-10-29 | Stop reason: HOSPADM

## 2024-10-25 RX ORDER — ROPIVACAINE HYDROCHLORIDE 2 MG/ML
INJECTION, SOLUTION EPIDURAL; INFILTRATION; PERINEURAL CONTINUOUS
Status: DISCONTINUED | OUTPATIENT
Start: 2024-10-25 | End: 2024-10-29 | Stop reason: HOSPADM

## 2024-10-25 RX ORDER — PANTOPRAZOLE SODIUM 40 MG/1
40 TABLET, DELAYED RELEASE ORAL
Status: DISCONTINUED | OUTPATIENT
Start: 2024-10-26 | End: 2024-10-29 | Stop reason: HOSPADM

## 2024-10-25 RX ORDER — FLUTICASONE PROPIONATE 50 MCG
1 SPRAY, SUSPENSION (ML) NASAL DAILY
Status: DISCONTINUED | OUTPATIENT
Start: 2024-10-26 | End: 2024-10-29 | Stop reason: HOSPADM

## 2024-10-25 RX ORDER — DEXAMETHASONE SODIUM PHOSPHATE 4 MG/ML
INJECTION, SOLUTION INTRA-ARTICULAR; INTRALESIONAL; INTRAMUSCULAR; INTRAVENOUS; SOFT TISSUE AS NEEDED
Status: DISCONTINUED | OUTPATIENT
Start: 2024-10-25 | End: 2024-10-25 | Stop reason: SURG

## 2024-10-25 RX ORDER — DROPERIDOL 2.5 MG/ML
0.62 INJECTION, SOLUTION INTRAMUSCULAR; INTRAVENOUS ONCE AS NEEDED
Status: DISCONTINUED | OUTPATIENT
Start: 2024-10-25 | End: 2024-10-25 | Stop reason: HOSPADM

## 2024-10-25 RX ORDER — ACETAMINOPHEN 325 MG/1
650 TABLET ORAL EVERY 4 HOURS PRN
Status: DISCONTINUED | OUTPATIENT
Start: 2024-10-25 | End: 2024-10-29 | Stop reason: HOSPADM

## 2024-10-25 RX ORDER — ACETAMINOPHEN 500 MG
1000 TABLET ORAL ONCE
Status: COMPLETED | OUTPATIENT
Start: 2024-10-25 | End: 2024-10-25

## 2024-10-25 RX ORDER — ALPRAZOLAM 1 MG/1
1 TABLET ORAL 3 TIMES DAILY PRN
Status: DISCONTINUED | OUTPATIENT
Start: 2024-10-25 | End: 2024-10-29 | Stop reason: HOSPADM

## 2024-10-25 RX ORDER — PRAVASTATIN SODIUM 20 MG
20 TABLET ORAL DAILY
Status: DISCONTINUED | OUTPATIENT
Start: 2024-10-25 | End: 2024-10-29 | Stop reason: HOSPADM

## 2024-10-25 RX ORDER — LIDOCAINE HYDROCHLORIDE 10 MG/ML
0.5 INJECTION, SOLUTION EPIDURAL; INFILTRATION; INTRACAUDAL; PERINEURAL ONCE AS NEEDED
Status: COMPLETED | OUTPATIENT
Start: 2024-10-25 | End: 2024-10-25

## 2024-10-25 RX ORDER — ONDANSETRON 4 MG/1
4 TABLET, ORALLY DISINTEGRATING ORAL EVERY 6 HOURS PRN
Status: DISCONTINUED | OUTPATIENT
Start: 2024-10-25 | End: 2024-10-29 | Stop reason: HOSPADM

## 2024-10-25 RX ORDER — NALOXONE HCL 0.4 MG/ML
0.4 VIAL (ML) INJECTION
Status: DISCONTINUED | OUTPATIENT
Start: 2024-10-25 | End: 2024-10-29 | Stop reason: HOSPADM

## 2024-10-25 RX ORDER — BENZONATATE 100 MG/1
100 CAPSULE ORAL EVERY 8 HOURS PRN
Status: DISCONTINUED | OUTPATIENT
Start: 2024-10-25 | End: 2024-10-29 | Stop reason: HOSPADM

## 2024-10-25 RX ORDER — FENTANYL CITRATE 50 UG/ML
50 INJECTION, SOLUTION INTRAMUSCULAR; INTRAVENOUS
Status: DISCONTINUED | OUTPATIENT
Start: 2024-10-25 | End: 2024-10-25 | Stop reason: HOSPADM

## 2024-10-25 RX ORDER — PROPOFOL 10 MG/ML
VIAL (ML) INTRAVENOUS AS NEEDED
Status: DISCONTINUED | OUTPATIENT
Start: 2024-10-25 | End: 2024-10-25 | Stop reason: SURG

## 2024-10-25 RX ORDER — BUPROPION HYDROCHLORIDE 150 MG/1
300 TABLET ORAL DAILY
Status: DISCONTINUED | OUTPATIENT
Start: 2024-10-26 | End: 2024-10-29 | Stop reason: HOSPADM

## 2024-10-25 RX ORDER — SODIUM CHLORIDE 0.9 % (FLUSH) 0.9 %
10 SYRINGE (ML) INJECTION EVERY 12 HOURS SCHEDULED
Status: DISCONTINUED | OUTPATIENT
Start: 2024-10-25 | End: 2024-10-25 | Stop reason: HOSPADM

## 2024-10-25 RX ORDER — HYDROMORPHONE HYDROCHLORIDE 1 MG/ML
0.5 INJECTION, SOLUTION INTRAMUSCULAR; INTRAVENOUS; SUBCUTANEOUS
Status: DISCONTINUED | OUTPATIENT
Start: 2024-10-25 | End: 2024-10-25 | Stop reason: HOSPADM

## 2024-10-25 RX ORDER — FENTANYL CITRATE 50 UG/ML
INJECTION, SOLUTION INTRAMUSCULAR; INTRAVENOUS
Status: COMPLETED | OUTPATIENT
Start: 2024-10-25 | End: 2024-10-25

## 2024-10-25 RX ORDER — PREGABALIN 75 MG/1
75 CAPSULE ORAL ONCE
Status: COMPLETED | OUTPATIENT
Start: 2024-10-25 | End: 2024-10-25

## 2024-10-25 RX ORDER — ASPIRIN 81 MG/1
81 TABLET ORAL DAILY
Status: DISCONTINUED | OUTPATIENT
Start: 2024-10-26 | End: 2024-10-29 | Stop reason: HOSPADM

## 2024-10-25 RX ORDER — CLOPIDOGREL BISULFATE 75 MG/1
75 TABLET ORAL DAILY
Status: DISCONTINUED | OUTPATIENT
Start: 2024-10-26 | End: 2024-10-29 | Stop reason: HOSPADM

## 2024-10-25 RX ORDER — OXYCODONE AND ACETAMINOPHEN 5; 325 MG/1; MG/1
1 TABLET ORAL EVERY 4 HOURS PRN
Status: DISCONTINUED | OUTPATIENT
Start: 2024-10-25 | End: 2024-10-29 | Stop reason: HOSPADM

## 2024-10-25 RX ORDER — CHOLECALCIFEROL (VITAMIN D3) 25 MCG
2000 TABLET ORAL DAILY
Status: DISCONTINUED | OUTPATIENT
Start: 2024-10-26 | End: 2024-10-29 | Stop reason: HOSPADM

## 2024-10-25 RX ORDER — ONDANSETRON 2 MG/ML
INJECTION INTRAMUSCULAR; INTRAVENOUS AS NEEDED
Status: DISCONTINUED | OUTPATIENT
Start: 2024-10-25 | End: 2024-10-25 | Stop reason: SURG

## 2024-10-25 RX ORDER — LABETALOL HYDROCHLORIDE 5 MG/ML
10 INJECTION, SOLUTION INTRAVENOUS EVERY 4 HOURS PRN
Status: DISCONTINUED | OUTPATIENT
Start: 2024-10-25 | End: 2024-10-29 | Stop reason: HOSPADM

## 2024-10-25 RX ORDER — ONDANSETRON 2 MG/ML
4 INJECTION INTRAMUSCULAR; INTRAVENOUS EVERY 6 HOURS PRN
Status: DISCONTINUED | OUTPATIENT
Start: 2024-10-25 | End: 2024-10-29 | Stop reason: HOSPADM

## 2024-10-25 RX ORDER — SODIUM CHLORIDE, SODIUM LACTATE, POTASSIUM CHLORIDE, CALCIUM CHLORIDE 600; 310; 30; 20 MG/100ML; MG/100ML; MG/100ML; MG/100ML
9 INJECTION, SOLUTION INTRAVENOUS ONCE
Status: COMPLETED | OUTPATIENT
Start: 2024-10-25 | End: 2024-10-25

## 2024-10-25 RX ORDER — CARBOXYMETHYLCELLULOSE SODIUM 5 MG/ML
2 SOLUTION/ DROPS OPHTHALMIC 3 TIMES DAILY
Status: DISCONTINUED | OUTPATIENT
Start: 2024-10-25 | End: 2024-10-29 | Stop reason: HOSPADM

## 2024-10-25 RX ORDER — CARVEDILOL 12.5 MG/1
12.5 TABLET ORAL 2 TIMES DAILY WITH MEALS
Status: DISCONTINUED | OUTPATIENT
Start: 2024-10-25 | End: 2024-10-29 | Stop reason: HOSPADM

## 2024-10-25 RX ORDER — SODIUM CHLORIDE 0.9 % (FLUSH) 0.9 %
10 SYRINGE (ML) INJECTION AS NEEDED
Status: DISCONTINUED | OUTPATIENT
Start: 2024-10-25 | End: 2024-10-25 | Stop reason: HOSPADM

## 2024-10-25 RX ORDER — LOSARTAN POTASSIUM 50 MG/1
100 TABLET ORAL NIGHTLY
Status: DISCONTINUED | OUTPATIENT
Start: 2024-10-25 | End: 2024-10-29 | Stop reason: HOSPADM

## 2024-10-25 RX ORDER — MELOXICAM 15 MG/1
15 TABLET ORAL ONCE
Status: COMPLETED | OUTPATIENT
Start: 2024-10-25 | End: 2024-10-25

## 2024-10-25 RX ORDER — HYDROMORPHONE HYDROCHLORIDE 1 MG/ML
0.5 INJECTION, SOLUTION INTRAMUSCULAR; INTRAVENOUS; SUBCUTANEOUS
Status: DISCONTINUED | OUTPATIENT
Start: 2024-10-25 | End: 2024-10-28

## 2024-10-25 RX ORDER — BUPIVACAINE HYDROCHLORIDE 2.5 MG/ML
INJECTION, SOLUTION EPIDURAL; INFILTRATION; INTRACAUDAL
Status: COMPLETED | OUTPATIENT
Start: 2024-10-25 | End: 2024-10-25

## 2024-10-25 RX ORDER — BUPIVACAINE HCL/0.9 % NACL/PF 0.125 %
PLASTIC BAG, INJECTION (ML) EPIDURAL AS NEEDED
Status: DISCONTINUED | OUTPATIENT
Start: 2024-10-25 | End: 2024-10-25 | Stop reason: SURG

## 2024-10-25 RX ORDER — OXYCODONE AND ACETAMINOPHEN 10; 325 MG/1; MG/1
1 TABLET ORAL EVERY 4 HOURS PRN
Status: DISCONTINUED | OUTPATIENT
Start: 2024-10-25 | End: 2024-10-29 | Stop reason: HOSPADM

## 2024-10-25 RX ORDER — MELOXICAM 15 MG/1
7.5 TABLET ORAL DAILY
Status: DISCONTINUED | OUTPATIENT
Start: 2024-10-26 | End: 2024-10-29 | Stop reason: HOSPADM

## 2024-10-25 RX ADMIN — BUPIVACAINE HYDROCHLORIDE 30 ML: 2.5 INJECTION, SOLUTION EPIDURAL; INFILTRATION; INTRACAUDAL at 12:58

## 2024-10-25 RX ADMIN — PRAVASTATIN SODIUM 20 MG: 20 TABLET ORAL at 20:06

## 2024-10-25 RX ADMIN — PREGABALIN 75 MG: 75 CAPSULE ORAL at 13:17

## 2024-10-25 RX ADMIN — Medication 1000 MG: at 17:26

## 2024-10-25 RX ADMIN — FAMOTIDINE 20 MG: 20 TABLET, FILM COATED ORAL at 13:16

## 2024-10-25 RX ADMIN — PROPOFOL 150 MG: 10 INJECTION, EMULSION INTRAVENOUS at 15:35

## 2024-10-25 RX ADMIN — LOSARTAN POTASSIUM 100 MG: 50 TABLET, FILM COATED ORAL at 20:07

## 2024-10-25 RX ADMIN — CARVEDILOL 12.5 MG: 12.5 TABLET, FILM COATED ORAL at 20:06

## 2024-10-25 RX ADMIN — SODIUM CHLORIDE, SODIUM LACTATE, POTASSIUM CHLORIDE, CALCIUM CHLORIDE 9 ML/HR: 20; 30; 600; 310 INJECTION, SOLUTION INTRAVENOUS at 13:17

## 2024-10-25 RX ADMIN — DEXAMETHASONE SODIUM PHOSPHATE 8 MG: 4 INJECTION INTRA-ARTICULAR; INTRALESIONAL; INTRAMUSCULAR; INTRAVENOUS; SOFT TISSUE at 15:49

## 2024-10-25 RX ADMIN — SODIUM CHLORIDE 2000 MG: 900 INJECTION INTRAVENOUS at 15:41

## 2024-10-25 RX ADMIN — FENTANYL CITRATE 100 MCG: 50 INJECTION, SOLUTION INTRAMUSCULAR; INTRAVENOUS at 12:58

## 2024-10-25 RX ADMIN — LIDOCAINE HYDROCHLORIDE 50 MG: 10 INJECTION, SOLUTION EPIDURAL; INFILTRATION; INTRACAUDAL; PERINEURAL at 15:35

## 2024-10-25 RX ADMIN — LIDOCAINE HYDROCHLORIDE 0.5 ML: 10 INJECTION, SOLUTION EPIDURAL; INFILTRATION; INTRACAUDAL; PERINEURAL at 13:16

## 2024-10-25 RX ADMIN — BUPIVACAINE HYDROCHLORIDE 30 ML: 2.5 INJECTION, SOLUTION EPIDURAL; INFILTRATION; INTRACAUDAL; PERINEURAL at 12:58

## 2024-10-25 RX ADMIN — ONDANSETRON 4 MG: 2 INJECTION INTRAMUSCULAR; INTRAVENOUS at 17:02

## 2024-10-25 RX ADMIN — ACETAMINOPHEN 1000 MG: 500 TABLET ORAL at 13:17

## 2024-10-25 RX ADMIN — MELOXICAM 15 MG: 15 TABLET ORAL at 13:17

## 2024-10-25 RX ADMIN — SODIUM CHLORIDE 2000 MG: 900 INJECTION INTRAVENOUS at 22:40

## 2024-10-25 RX ADMIN — Medication 100 MCG: at 16:06

## 2024-10-25 NOTE — LETTER
"    EMS Transport Request  For use at [facility], [place], [place], [place], and [place] only   Patient Name: Nallely Mccabe : 1950   Weight:77.6 kg (171 lb 1.2 oz) Pick-up Location: [facility] BLS/ALS: {BLS/ALS:18325}   Insurance: [employer] MEDICARE REPLACEMENT Auth End Date: ***   Pre-Cert #: D/C Summary complete:    Destination: {Transport Locations:[facility]}   Contact Precautions: {Precautions:21461}   Equipment (O2, Fluids, etc.): {Equipment:49488}   Arrive By Date/Time: *** Stretcher/WC: {Stretcher/WC:67594}   CM Requesting: Laura Kat RN Ext: ***   Notes/Medical Necessity: ***     ______________________________________________________________________    *Only 2 patient bags OR 1 carry-on size bag are permitted.  Wheelchairs and walkers CANNOT transported with the patient. Acknowledge: {Acknowledge:21446::\"Yes\"}    "

## 2024-10-25 NOTE — ANESTHESIA POSTPROCEDURE EVALUATION
Patient: Nallely Mccabe    Procedure Summary       Date: 10/25/24 Room / Location:  [facility] 10 /  [facility]    Anesthesia Start: 1527 Anesthesia Stop: 1713    Procedure: ANKLE OPEN REDUCTION INTERNAL FIXATION (Left: Ankle) Diagnosis:     Surgeons: Alex Martinez MD Provider: Charlie Hernandez MD    Anesthesia Type: general with block ASA Status: 3            Anesthesia Type: general with block    Vitals  No vitals data found for the desired time range.          Post Anesthesia Care and Evaluation    Patient location during evaluation: PACU  Patient participation: complete - patient participated  Level of consciousness: awake and alert  Pain management: adequate    Airway patency: patent  Anesthetic complications: No anesthetic complications  PONV Status: none  Cardiovascular status: hemodynamically stable and acceptable  Respiratory status: nonlabored ventilation, acceptable and nasal cannula  Hydration status: acceptable

## 2024-10-25 NOTE — OP NOTE
ANKLE OPEN REDUCTION INTERNAL FIXATION  Procedure Report    Patient Name:  Nallely Mccabe  YOB: 1950    Date of Surgery:  10/25/2024     Indications: 74-year-old with left ankle bimalleolar ankle fracture.  Treatment options were discussed including operative versus nonoperative treatment.  Risks and benefits of surgery were discussed including but not limited to bleeding, infection, injury to surrounding structures such as blood vessels tendons and nerves. We discussed the possibility of surgical procedure failure, malunion, nonunion, painful hardware, decreased function, wound healing complications, persistent pain, loss of motion, persistent stiffness, persistent weakness, and the need for a revision surgery. In addition, we discussed the risk of heart attack, stroke, or death.  He understands these as well as alternative forms of treatment and does elect to proceed.    Pre-op Diagnosis:      Left ankle bimalleolar ankle fracture    Post-op Diagnosis:       Left ankle bimalleolar ankle fracture    Procedure/CPT® Codes:      Procedure(s):  ANKLE OPEN REDUCTION INTERNAL FIXATION left ankle by mall ankle fracture with application of short leg splint    Staff:  Surgeon(s):  Alex Martinez MD    Circulator: Rah Trinidad RN  Scrub Person: Samuel Howell  Vendor Representative: Sher Patel  Nursing Assistant: Angie Ndiaye           Anesthesia: General with Block    Estimated Blood Loss:  25cc    Implants:    Implant Name Type Inv. Item Serial No.  Lot No. LRB No. Used Action   SYS IMP ANKL FIBULOCK W/ENDCP DISP STRL - [id number] Implant SYS IMP ANKL FIBULOCK W/ENDCP DISP STRL  [employer] [id number] Left 1 Implanted   SUT FW #2 W/TPR NDL 1/2 CIR 38IN 97CM 26.5MM JORGE LUIS - [id number] Implant SUT FW #2 W/TPR NDL 1/2 CIR 38IN 97CM 26.5MM JORGE LUIS  [employer] [id number] Left 2 Implanted   NAIL IM FIB/ANKL FIBULOCK 3.1T564WY LT - [id number] Implant NAIL IM FIB/ANKL FIBULOCK 3.5Y445BP LT  [employer] [id number] Left  1 Implanted   SCRW CANTR LP SS 3X18MM - [id number] Implant SCRW CANTR LP SS 3X18MM  [employer]  Left 2 Implanted   SCRSHAHLA ESPINOZA LP THRD/SHRT SS 4X50MM - [id number] Implant SCRW OLGA LP THRD/SHRT SS 4X50MM  [employer]  Left 2 Implanted       Specimen:                None       Complications: None apparent    Description of Procedure:   Patient was identified in the preoperative holding area and the left ankle was marked.  Patient was transported to the OR and place supine on the operative table.  The left ankle was prepped and draped In the usual sterile fashion.  preoperative time out was performed indicating correct patient, correct side, correct procedure, and that preoperative antibiotic had been given.  I Began with holding a reduction and checking this under C-arm.  This was not adequate therefore I did elect to make a small open incision over the distal fibula just at the level of the fracture alone.  The fracture was provisionally reduced.  In order to hold this reduction and not interfere with placement of the fibula nail I did pass to FiberWire sutures and tied these in a cerclage type fashion to hold the reduction.  This did do an excellent job.  This was checked under AP and lateral views which showed good reduction of the fracture.  I next proceeded with an small incision distal to the tip of the fibula.  Guidewire was positioned at the distal end of the fibula centered on the AP and lateral projections on C arm.  This was driven up to the fibula intramedullary.  An opening reamer was then utilized on the distal segment and the shaft was also overreamed with appropriate sized reamer.  The appropriate sized nail was then selected .   the nail was inserted to the appropriate depth.  The proximal talons of the nail were then deployed.  Next using the outrigger jig 2 interlocking screws were placed into the distal fibula.  This did show well-maintained reduction of the fibula at this point.      An extra my attention  to the medial malleolus.  After reduction of the distal fibula it was noted that the medial malleolus was reduced at this point.  Therefore I felt I could proceed with a percutaneous fixation of this as well.  Under x-ray guidance K wire was placed from distal to proximal across the medial malleolus fracture.  This was checked in both AP and lateral view and shown show good position.  Also showed well-maintained reduction of the fracture.  2 partially-threaded 4.0 mm cannulated screws were then drilled for and then placed to hold the reduction.     X-ray was again obtained and this time a dynamic stress test was performed which  showed good stable reduction of the medial and tib-fib clear spaces without gapping.  Based on this I did not feel that additional syndesmotic fixation was required.     The wounds were then copiously irrigated.  The wounds were then closed in layers using 2-0 Vicryl and 3-0 nylon for the skin.  The leg was then washed and dressed.  The patient was placed in a well-padded short leg splint awoken and taken to the recovery room in stable condition.     Disposition:  Patient be nonweightbearing to the left lower extremity.  Splint will stay on intact until follow-up with me in the office in about 2 weeks.  Sutures were removed at that point and new x-rays will be obtained.  Plan will be to transition into a walking boot at that visit and can begin to progress progression of weightbearing starting around 4 weeks postop.      Alex Martinez MD     Date: 10/25/2024  Time: 17:25 EDT     numerical 0-10

## 2024-10-25 NOTE — ANESTHESIA PREPROCEDURE EVALUATION
Anesthesia Evaluation     Patient summary reviewed and Nursing notes reviewed   no history of anesthetic complications:   NPO Solid Status: > 8 hours  NPO Liquid Status: > 8 hours           Airway   Mallampati: II  TM distance: >3 FB  Neck ROM: full  No difficulty expected  Dental - normal exam     Pulmonary - negative pulmonary ROS and normal exam   Cardiovascular - normal exam  Exercise tolerance: good (4-7 METS)    ECG reviewed    (+) hypertension, past MI , hyperlipidemia  (-) angina, SCHROEDER    ROS comment: Takosubo cardiomyopathy several years ago; no problems now    Neuro/Psych  (+) psychiatric history Anxiety  (-) seizures, TIA, CVA  GI/Hepatic/Renal/Endo    (+) obesity, GERD well controlled, renal disease (stage 3 CKD)- CRI  (-) diabetes, no thyroid disorder    Musculoskeletal     Abdominal    Substance History      OB/GYN          Other        ROS/Med Hx Other: Cr: 1.24  H/H: 11.5/35.5              Anesthesia Plan    ASA 3     general with block     intravenous induction     Anesthetic plan, risks, benefits, and alternatives have been provided, discussed and informed consent has been obtained with: patient.    Plan discussed with CRNA.    CODE STATUS:

## 2024-10-25 NOTE — H&P
Pre-Op H&P  Nallely Mccabe  [id number]  1950      Chief complaint: Left ankle fracture      Subjective:  Patient is a 74 y.o.female presents for scheduled surgery by Dr. Martinez. She anticipates a ANKLE OPEN REDUCTION INTERNAL FIXATION  today. She had a fall at home on 10/19/24 and injured her left ankle. She went to ER on 4/20/24 and was found to have a mildly displaced distal fibular and medial malleolar fractures. She was placed in a splint and has been partial weight-bearing with a walker.      Review of Systems:  Constitutional-- No fever, chills or sweats. No fatigue.  CV-- No chest pain, palpitation or syncope. +HTN, HLD, CAD  Resp-- No SOB, cough, hemoptysis  Skin--No rashes or lesions      Allergies: No Known Allergies      Home Meds:  Medications Prior to Admission   Medication Sig Dispense Refill Last Dose/Taking    acetaminophen (TYLENOL) 500 MG tablet Take 1 tablet by mouth As Needed for Mild Pain.       ALPRAZolam (XANAX) 1 MG tablet Take 1 tablet by mouth 3 (Three) Times a Day As Needed for Anxiety.  0     aspirin 81 MG EC tablet Take 1 tablet by mouth Daily.       benzonatate (TESSALON) 100 MG capsule Take 1 capsule by mouth As Needed for Cough.       buPROPion XL (WELLBUTRIN XL) 300 MG 24 hr tablet Take 1 tablet by mouth Daily.  0     carboxymethylcellulose (REFRESH PLUS) 0.5 % solution Administer 2 drops to both eyes 3 (Three) Times a Day As Needed for Dry Eyes.       carvedilol (COREG) 12.5 MG tablet Take 1 tablet by mouth 2 (Two) Times a Day With Meals.       Cholecalciferol (Vitamin D3) 50 MCG (2000 UT) capsule Take 1 capsule by mouth every night at bedtime. 42 capsule 0     clopidogrel (PLAVIX) 75 MG tablet Take 1 tablet by mouth Daily. (Patient taking differently: Take 1 tablet by mouth Daily. Office stated pt can continue; however, pt stopped 10/19/24) 90 tablet 3     diclofenac (VOLTAREN) 1 % gel gel Apply 4 g topically As Needed.       dicyclomine (BENTYL) 10 MG capsule Take 1  capsule by mouth As Needed for Abdominal Cramping.  0     fluticasone (FLONASE) 50 MCG/ACT nasal spray Administer 1 spray into the nostril(s) as directed by provider Daily.  0     HYDROcodone-acetaminophen (NORCO) 5-325 MG per tablet Take 1 tablet by mouth Every 6 (Six) Hours As Needed for Severe Pain. 12 tablet 0     ibuprofen (ADVIL,MOTRIN) 600 MG tablet Take 1 tablet by mouth Every 6 (Six) Hours As Needed for Mild Pain. 20 tablet 0     lansoprazole (PREVACID) 30 MG capsule Take 1 capsule by mouth 2 (Two) Times a Day.       losartan (COZAAR) 50 MG tablet Take 2 tablets by mouth Every Night.       MEGARED OMEGA-3 KRILL  MG capsule Take 1,000 mg by mouth Daily.       methenamine (HIPREX) 1 g tablet Take 1 tablet by mouth Every 12 (Twelve) Hours.       Multiple Vitamins-Minerals (PRESERVISION AREDS 2 PO) Take 1 tablet by mouth 2 (Two) Times a Day.       naloxone (NARCAN) 4 MG/0.1ML nasal spray Call 911. Don't prime. [place] in 1 nostril for overdose. Repeat in 2-3 minutes in other nostril if no or minimal breathing/responsiveness. (Patient taking differently: Administer 1 spray into the nostril(s) as directed by provider As Needed for Opioid Reversal. Call 911. Don't prime. [place] in 1 nostril for overdose. Repeat in 2-3 minutes in other nostril if no or minimal breathing/responsiveness.) 2 each 0     ondansetron ODT (ZOFRAN-ODT) 4 MG disintegrating tablet Place 1 tablet on the tongue 4 (Four) Times a Day As Needed for Nausea or Vomiting.       Polyethylene Glycol 3350 (MIRALAX PO) As Needed.       pravastatin (PRAVACHOL) 20 MG tablet Take 1 tablet by mouth every night at bedtime. 90 tablet 3     sertraline (ZOLOFT) 50 MG tablet Take 1.5 tablets by mouth Daily.            PMH:   Past Medical History:   Diagnosis Date    Anxiety     CAD (coronary artery disease)     CKD (chronic kidney disease) stage 3, GFR 30-59 ml/min     Depression     GERD (gastroesophageal reflux disease)     Hyperlipidemia     Hypertension      Myocardial infarction 10/18/2011    Obesity (BMI 30.0-34.9)     Pyelonephritis     Pyelonephritis, acute 05/24/2018    Right sided    Sepsis     Sepsis 05/23/2018    UTI (urinary tract infection)      PSH:    Past Surgical History:   Procedure Laterality Date    CARDIAC CATHETERIZATION  10/18/2011    COLONOSCOPY      CORONARY ANGIOPLASTY  10/18/2011    EYE SURGERY      ORIF HUMERUS FRACTURE      POLYPECTOMY      SINUS SURGERY         Immunization History:  Influenza: UTD  Pneumococcal: UTD  Tetanus: UTD    Social History:   Tobacco:   Social History     Tobacco Use   Smoking Status Never   Smokeless Tobacco Never   Tobacco Comments    second hand smoke      Alcohol:     Social History     Substance and Sexual Activity   Alcohol Use No         Physical Exam: VS: /84  HR 76  RR 16  T 97.5  Sat 96%RA      General Appearance:    Alert, cooperative, no distress, appears stated age   Head:    Normocephalic, without obvious abnormality, atraumatic   Lungs:     Clear to auscultation bilaterally, respirations unlabored    Heart:   Regular rate and rhythm, S1 and S2 normal    Abdomen:    Soft without tenderness   Extremities:   Extremities normal, atraumatic, no cyanosis or edema   Skin:   Skin color, texture, turgor normal, no rashes or lesions   Neurologic:   Grossly intact     Results Review:     LABS:  Lab Results   Component Value Date    WBC 8.34 10/24/2024    HGB 11.5 (L) 10/24/2024    HCT 35.5 10/24/2024    .9 (H) 10/24/2024     10/24/2024    NEUTROABS 5.71 10/24/2024    GLUCOSE 121 (H) 10/24/2024    BUN 15 10/24/2024    CREATININE 1.24 (H) 10/24/2024    EGFRIFNONA 54 (L) 11/18/2021     10/24/2024    K 4.5 10/24/2024     10/24/2024    CO2 23.0 10/24/2024    MG 2.0 05/24/2018    PHOS 3.4 11/17/2021    CALCIUM 8.7 10/24/2024    ALBUMIN 3.5 10/24/2024    AST 16 10/24/2024    ALT 8 10/24/2024    BILITOT 0.5 10/24/2024       RADIOLOGY:  Study Result    Narrative & Impression   XR ANKLE 3+  VW LEFT, XR FOOT 3+ VW LEFT     Date of Exam: 10/20/2024 6:26 PM EDT     Indication: fall pain     Comparison: None available.     Findings:  Left ankle: Mildly displaced oblique fracture of the distal fibula extending to the level of the tibial plafond. Minimally displaced medial malleolar fracture. There is soft tissue swelling about the ankle.     Left foot: There are degenerative changes of the first MTP joint. There is chronic flattening of the second metatarsal head. No acute fracture in the foot.     IMPRESSION:  Impression:  Mildly displaced distal fibular and medial malleolar fractures.         I reviewed the patient's new clinical results.    Cancer Staging (if applicable)  Cancer Patient: __ yes __no __unknown; If yes, clinical stage T:__ N:__M:__, stage group or __N/A      Impression: Mildly displaced distal fibular and medial malleolar fractures      Plan: ANKLE OPEN REDUCTION INTERNAL FIXATION - left      Conchis Abarca, PARISA   10/25/2024   12:46 EDT

## 2024-10-25 NOTE — ANESTHESIA PROCEDURE NOTES
Airway  Urgency: elective    Date/Time: 10/25/2024 3:36 PM  Airway not difficult    General Information and Staff    Patient location during procedure: OR    Indications and Patient Condition  Indications for airway management: airway protection    Preoxygenated: yes  Mask difficulty assessment: 1 - vent by mask    Final Airway Details  Final airway type: supraglottic airway      Successful airway: I-gel  Size 3     Number of attempts at approach: 1  Assessment: lips, teeth, and gum same as pre-op    Additional Comments  LMA placed without difficulty, ventilation with assist, equal breath sounds and symmetric chest rise and fall

## 2024-10-25 NOTE — ANESTHESIA PROCEDURE NOTES
Peripheral Block      Patient reassessed immediately prior to procedure    Reason for block: at surgeon's request and post-op pain management  Performed by  CRNA/CAA: Paul Chaidez CRNA  Preanesthetic Checklist  Completed: patient identified, IV checked, site marked, risks and benefits discussed, surgical consent, monitors and equipment checked, pre-op evaluation and timeout performed  Prep:  Pt Position: supine  Sterile barriers:cap, gloves, mask, sterile barriers and washed/disinfected hands  Prep: ChloraPrep  Patient monitoring: blood pressure monitoring, continuous pulse oximetry and EKG  Procedure  Performed under: spinal  Guidance:ultrasound guided    ULTRASOUND INTERPRETATION.  Using ultrasound guidance a 20 G gauge needle was placed in close proximity to the nerve, at which point, under ultrasound guidance anesthetic was injected in the area of the nerve and spread of the anesthesia was seen on ultrasound in close proximity thereto.  There were no abnormalities seen on ultrasound; a digital image was taken; and the patient tolerated the procedure with no complications. Images:still images obtained, printed/placed on chart    Block Type:adductor canal block  Injection Technique:single-shot  Needle Type:Tuohy and echogenic  Needle Gauge:18 G  Resistance on Injection: none  Catheter Size:20 G (20g)    Medications Used: bupivacaine PF (MARCAINE) 0.25 % injection - Injection   30 mL - 10/25/2024 12:58:00 PM  fentaNYL citrate (PF) (SUBLIMAZE) injection - Intravenous   100 mcg - 10/25/2024 12:58:00 PM      Post Assessment  Injection Assessment: negative aspiration for heme, incremental injection and no paresthesia on injection  Patient Tolerance:comfortable throughout block  Complications:no  Additional Notes  SINGLE shot   A high-frequency linear transducer, with sterile cover, was placed on the anterior mid-thigh (between the anterior superior iliac spine and patella). The transducer was then moved medially to  "identify the Sartorius muscle (Juan J), Vastus Medialis muscle (VMM), Superficial Femoral Artery (SFA) and Vein. The transducer was then moved cephalad or caudad to position the SFA in the middle of the Juan J. The insertion site was prepped and draped in sterile fashion. Skin and cutaneous tissue was infiltrated with 2-5 ml of 1% Lidocaine. Using ultrasound-guidance, a 20-gauge B-Huang 4\" Ultraplex 360 non-stimulating echogenic needle was advanced in plane from lateral to medial. Preservative-free normal saline was utilized for hydro-dissection of tissue, advancement of needle, and to confirm needle placement below the fascial plane of the Juan J where the Nerve to the VMM is located. Local anesthetic (LA) 5 ml deposited here. The needle continues its path lateral to the SFA at the level of the Saphenous Nerve. The remainder of the LA was deposited at the 10-11 o'clock position of the SFA. This injection created a space between the Juan J and the SFA. Aspiration every 5 ml to prevent intravascular injection. Injection was completed with negative aspiration of blood and negative intravascular injection. Injection pressures were normal with minimal resistance.   Performed by: Paul Chaidez CRNA            "

## 2024-10-25 NOTE — ANESTHESIA PROCEDURE NOTES
Peripheral Block      Patient reassessed immediately prior to procedure    Patient location during procedure: pre-op  Reason for block: at surgeon's request and post-op pain management  Performed by  CRNA/CAA: Paul Chaidez CRNA  Preanesthetic Checklist  Completed: patient identified, IV checked, site marked, risks and benefits discussed, surgical consent, monitors and equipment checked, pre-op evaluation and timeout performed  Prep:  Sterile barriers:cap, gloves, mask and washed/disinfected hands  Prep: ChloraPrep  Patient monitoring: blood pressure monitoring, continuous pulse oximetry and EKG  Procedure    Sedation: yes  Performed under: local infiltration  Guidance:ultrasound guided    ULTRASOUND INTERPRETATION.  Using ultrasound guidance a 20 G gauge needle was placed in close proximity to the nerve, at which point, under ultrasound guidance anesthetic was injected in the area of the nerve and spread of the anesthesia was seen on ultrasound in close proximity thereto.  There were no abnormalities seen on ultrasound; a digital image was taken; and the patient tolerated the procedure with no complications. Images:still images obtained, printed/placed on chart    Laterality:left  Block Type:popliteal  Injection Technique:catheter  Needle Type:echogenic and Tuohy  Needle Gauge:18 G  Resistance on Injection: none  Catheter Size:20 G  Cath Depth at skin: 8 cm    Medications Used: bupivacaine PF (MARCAINE) 0.25 % injection - Injection   30 mL - 10/25/2024 12:58:00 PM      Post Assessment  Injection Assessment: negative aspiration for heme, no paresthesia on injection and incremental injection  Patient Tolerance:comfortable throughout block  Complications:no  Additional Notes  CATHETER                               A high-frequency linear transducer, with sterile cover, was placed in the popliteal fossa to identify the popliteal artery and vein, Tibial nerve (TN) and Common Peroneal nerve (CP). The transducer was  "then moved in a cephalad fashion to observe the TN and CP nerve bifurcation to form the Sciatic Nerve. The insertion site was prepped and draped in sterile fashion. Skin and cutaneous tissue was infiltrated with 2-5 ml of 1% Lidocaine. Using ultrasound-guidance, an 18-gauge Contiplex Ultra 360 Touhy needle was advanced in plane from lateral to medial. Preservative-free normal saline was utilized for hydro-dissection of tissue, advancement of Touhy needle, and to confirm final needle placement posterior to the nerves. Local anesthetic injection spread, in incremental 3-5 ml injections, to surround both nerve structures. Aspiration every 5 ml to prevent intravascular injection. Injection was completed with negative aspiration of blood and negative intravascular injection. Injection pressures were normal with minimal resistance. A 20-gauge Contiplex Echo catheter was placed through the needle and advance out the tip of the Touhy 1-3 cm. The Touhy needle was then removed, and final catheter position verified (Below/above or Anterior/Posterior) the nerve structures. The catheter was secured in the usual fashion with skin glue, benzoin, steri-strips, CHG tegaderm and Label noting \"Nerve Block Catheter\". Jerk tape applied at yellow connector and catheter connection.    Performed by: Paul Chaidez CRNA            "

## 2024-10-26 PROBLEM — Z98.890 S/P ORIF (OPEN REDUCTION INTERNAL FIXATION) FRACTURE: Status: ACTIVE | Noted: 2024-10-26

## 2024-10-26 PROBLEM — Z87.81 S/P ORIF (OPEN REDUCTION INTERNAL FIXATION) FRACTURE: Status: ACTIVE | Noted: 2024-10-26

## 2024-10-26 PROCEDURE — A9270 NON-COVERED ITEM OR SERVICE: HCPCS | Performed by: ORTHOPAEDIC SURGERY

## 2024-10-26 PROCEDURE — 63710000001 CARVEDILOL 12.5 MG TABLET: Performed by: ORTHOPAEDIC SURGERY

## 2024-10-26 PROCEDURE — 25010000002 CEFAZOLIN PER 500 MG: Performed by: ORTHOPAEDIC SURGERY

## 2024-10-26 PROCEDURE — 63710000001 PRAVASTATIN 20 MG TABLET: Performed by: ORTHOPAEDIC SURGERY

## 2024-10-26 PROCEDURE — 63710000001 ALPRAZOLAM 1 MG TABLET: Performed by: ORTHOPAEDIC SURGERY

## 2024-10-26 PROCEDURE — G0378 HOSPITAL OBSERVATION PER HR: HCPCS

## 2024-10-26 PROCEDURE — 97161 PT EVAL LOW COMPLEX 20 MIN: CPT

## 2024-10-26 PROCEDURE — 63710000001 ONDANSETRON ODT 4 MG TABLET DISPERSIBLE: Performed by: ORTHOPAEDIC SURGERY

## 2024-10-26 PROCEDURE — 63710000001 LOSARTAN 50 MG TABLET: Performed by: ORTHOPAEDIC SURGERY

## 2024-10-26 PROCEDURE — 97110 THERAPEUTIC EXERCISES: CPT

## 2024-10-26 PROCEDURE — 63710000001 MELOXICAM 15 MG TABLET: Performed by: ORTHOPAEDIC SURGERY

## 2024-10-26 RX ADMIN — PANTOPRAZOLE SODIUM 40 MG: 40 TABLET, DELAYED RELEASE ORAL at 06:01

## 2024-10-26 RX ADMIN — SODIUM CHLORIDE 2000 MG: 900 INJECTION INTRAVENOUS at 06:00

## 2024-10-26 RX ADMIN — Medication 2000 UNITS: at 08:21

## 2024-10-26 RX ADMIN — ALPRAZOLAM 1 MG: 1 TABLET ORAL at 20:43

## 2024-10-26 RX ADMIN — CARVEDILOL 12.5 MG: 12.5 TABLET, FILM COATED ORAL at 08:21

## 2024-10-26 RX ADMIN — LOSARTAN POTASSIUM 100 MG: 50 TABLET, FILM COATED ORAL at 20:43

## 2024-10-26 RX ADMIN — PRAVASTATIN SODIUM 20 MG: 20 TABLET ORAL at 20:43

## 2024-10-26 RX ADMIN — CARVEDILOL 12.5 MG: 12.5 TABLET, FILM COATED ORAL at 17:07

## 2024-10-26 RX ADMIN — MELOXICAM 7.5 MG: 15 TABLET ORAL at 12:19

## 2024-10-26 RX ADMIN — SERTRALINE 75 MG: 50 TABLET, FILM COATED ORAL at 08:21

## 2024-10-26 RX ADMIN — BUPROPION HYDROCHLORIDE 300 MG: 150 TABLET, EXTENDED RELEASE ORAL at 08:22

## 2024-10-26 RX ADMIN — ASPIRIN 81 MG: 81 TABLET, COATED ORAL at 08:21

## 2024-10-26 RX ADMIN — ONDANSETRON 4 MG: 4 TABLET, ORALLY DISINTEGRATING ORAL at 10:36

## 2024-10-26 RX ADMIN — ACETAMINOPHEN 650 MG: 325 TABLET ORAL at 06:01

## 2024-10-26 RX ADMIN — CLOPIDOGREL BISULFATE 75 MG: 75 TABLET ORAL at 08:21

## 2024-10-26 NOTE — H&P
Patient Name: Nallely Mccabe  MRN: [id number]  : 1950  DOS: 10/25/2024    Attending: Alex Martinez MD    Primary Care Provider: Shay Rivera MD      Chief complaint: Left ankle fracture    Subjective   Patient is a pleasant 74 y.o. female presented for scheduled surgery by Dr. Martinez.     She had a fall at home on 10/19/24 and injured her left ankle. She went to ER on 24 and was found to have a mildly displaced distal fibular and medial malleolar fractures. She was placed in a splint and has been partial weight-bearing with a walker.     I saw her preoperatively, discussed with patient and her daughter patient's past medical history and home medication regimen.  She lives alone.  Her daughter plans to help her at home however the daughter herself has a back injury and has limited ability to lift.    Subsequent notes indicate she later underwent left ankle open reduction internal fixation of bimalleolar fracture with application of short leg splint.  Surgery was done under general anesthesia and a block, was tolerated well, she was admitted for further management.    Allergies:  No Known Allergies    Meds:  Medications Prior to Admission   Medication Sig Dispense Refill Last Dose/Taking    acetaminophen (TYLENOL) 500 MG tablet Take 1 tablet by mouth As Needed for Mild Pain.   10/24/2024    ALPRAZolam (XANAX) 1 MG tablet Take 1 tablet by mouth 3 (Three) Times a Day As Needed for Anxiety.  0 Past Week    aspirin 81 MG EC tablet Take 1 tablet by mouth Daily.   Past Month    benzonatate (TESSALON) 100 MG capsule Take 1 capsule by mouth As Needed for Cough.   Past Month    buPROPion XL (WELLBUTRIN XL) 300 MG 24 hr tablet Take 1 tablet by mouth Daily.  0 Past Week    carboxymethylcellulose (REFRESH PLUS) 0.5 % solution Administer 2 drops to both eyes 3 (Three) Times a Day As Needed for Dry Eyes.   10/25/2024    carvedilol (COREG) 12.5 MG tablet Take 1 tablet by mouth 2 (Two) Times a Day With  Meals.   10/25/2024    Cholecalciferol (Vitamin D3) 50 MCG (2000 UT) capsule Take 1 capsule by mouth every night at bedtime. 42 capsule 0 Past Week    fluticasone (FLONASE) 50 MCG/ACT nasal spray Administer 1 spray into the nostril(s) as directed by provider Daily.  0 10/24/2024    HYDROcodone-acetaminophen (NORCO) 5-325 MG per tablet Take 1 tablet by mouth Every 6 (Six) Hours As Needed for Severe Pain. 12 tablet 0 10/25/2024 at  7:00 AM    ibuprofen (ADVIL,MOTRIN) 600 MG tablet Take 1 tablet by mouth Every 6 (Six) Hours As Needed for Mild Pain. 20 tablet 0 10/25/2024 at  7:00 AM    lansoprazole (PREVACID) 30 MG capsule Take 1 capsule by mouth 2 (Two) Times a Day.   Past Week    losartan (COZAAR) 50 MG tablet Take 2 tablets by mouth Every Night.   10/24/2024    MEGARED OMEGA-3 KRILL  MG capsule Take 1,000 mg by mouth Daily.   Past Week    methenamine (HIPREX) 1 g tablet Take 1 tablet by mouth Every 12 (Twelve) Hours.   10/24/2024    Multiple Vitamins-Minerals (PRESERVISION AREDS 2 PO) Take 1 tablet by mouth 2 (Two) Times a Day.   Past Week    ondansetron ODT (ZOFRAN-ODT) 4 MG disintegrating tablet Place 1 tablet on the tongue 4 (Four) Times a Day As Needed for Nausea or Vomiting.   10/24/2024    Polyethylene Glycol 3350 (MIRALAX PO) As Needed.   Past Month    pravastatin (PRAVACHOL) 20 MG tablet Take 1 tablet by mouth every night at bedtime. 90 tablet 3 10/24/2024    sertraline (ZOLOFT) 50 MG tablet Take 1.5 tablets by mouth Daily.   Past Week    clopidogrel (PLAVIX) 75 MG tablet Take 1 tablet by mouth Daily. (Patient taking differently: Take 1 tablet by mouth Daily. Office stated pt can continue; however, pt stopped 10/19/24) 90 tablet 3 10/19/2024    diclofenac (VOLTAREN) 1 % gel gel Apply 4 g topically As Needed.   More than a month    dicyclomine (BENTYL) 10 MG capsule Take 1 capsule by mouth As Needed for Abdominal Cramping.  0 More than a month    naloxone (NARCAN) 4 MG/0.1ML nasal spray Call 911. Don't  prime. Spray in 1 nostril for overdose. Repeat in 2-3 minutes in other nostril if no or minimal breathing/responsiveness. (Patient taking differently: Administer 1 spray into the nostril(s) as directed by provider As Needed for Opioid Reversal. Call 911. Don't prime. Eads in 1 nostril for overdose. Repeat in 2-3 minutes in other nostril if no or minimal breathing/responsiveness.) 2 each 0 Unknown         History:   Past Medical History:   Diagnosis Date    Anxiety     CAD (coronary artery disease)     CKD (chronic kidney disease) stage 3, GFR 30-59 ml/min     Depression     GERD (gastroesophageal reflux disease)     Hyperlipidemia     Hypertension     Myocardial infarction 10/18/2011    Obesity (BMI 30.0-34.9)     Pyelonephritis     Pyelonephritis, acute 05/24/2018    Right sided    Sepsis     Sepsis 05/23/2018    UTI (urinary tract infection)      Past Surgical History:   Procedure Laterality Date    CARDIAC CATHETERIZATION  10/18/2011    COLONOSCOPY      CORONARY ANGIOPLASTY  10/18/2011    EYE SURGERY      ORIF HUMERUS FRACTURE      POLYPECTOMY      SINUS SURGERY       Family History   Problem Relation Age of Onset    Diabetes Mother     Kidney failure Mother     Anemia Mother     Arrhythmia Mother     Asthma Mother     Heart failure Mother     Hypertension Mother     Heart attack Father         rheumatic heart disease    Arthritis Sister     Hypertension Sister     Blindness Sister     Heart attack Maternal Grandmother     Heart failure Maternal Grandfather     Asthma Maternal Aunt     Asthma Maternal Uncle     Hypertension Sister     Breast cancer Neg Hx     Ovarian cancer Neg Hx      Social History     Tobacco Use    Smoking status: Never    Smokeless tobacco: Never    Tobacco comments:     second hand smoke   Vaping Use    Vaping status: Never Used    Passive vaping exposure: Yes   Substance Use Topics    Alcohol use: No    Drug use: Never       Review of Systems  Pertinent items are noted in HPI    Vital  "Signs  /84   Pulse 79   Temp 97.7 °F (36.5 °C) (Oral)   Resp 14   Ht 162.6 cm (64.02\")   Wt 77.6 kg (171 lb 1.2 oz)   SpO2 98%   BMI 29.35 kg/m²     Physical Exam:    General Appearance:    Alert, cooperative, in no acute distress   Head:    Normocephalic, without obvious abnormality, atraumatic   Eyes:            Lids and lashes normal, conjunctivae and sclerae normal, no   icterus, no pallor, corneas clear,    Ears:    Ears appear intact with no abnormalities noted   Throat:   No oral lesions, no thrush, oral mucosa moist   Neck:   No adenopathy, supple, trachea midline, no thyromegaly         Lungs:     Clear to auscultation,respirations regular, even and                   unlabored    Heart:    Regular rhythm and normal rate, normal S1 and S2, no            murmur, no gallop   Abdomen:     Normal bowel sounds, no masses, no organomegaly, soft,        nontender, nondistended, no guarding, no rebound                 tenderness   Genitalia:    Deferred   Extremities: Left ankle edema.  Intact pulses and cap refill distally.   Pulses:   Pulses palpable and equal bilaterally   Skin:   No bleeding, bruising or rash           I reviewed the patient's new clinical results.       Results from last 7 days   Lab Units 10/24/24  1352   WBC 10*3/mm3 8.34   HEMOGLOBIN g/dL 11.5*   HEMATOCRIT % 35.5   PLATELETS 10*3/mm3 357     Results from last 7 days   Lab Units 10/24/24  1352   SODIUM mmol/L 138   POTASSIUM mmol/L 4.5   CHLORIDE mmol/L 102   CO2 mmol/L 23.0   BUN mg/dL 15   CREATININE mg/dL 1.24*   CALCIUM mg/dL 8.7   BILIRUBIN mg/dL 0.5   ALK PHOS U/L 86   ALT (SGPT) U/L 8   AST (SGOT) U/L 16   GLUCOSE mg/dL 121*     Lab Results   Component Value Date    HGBA1C 4.40 (L) 10/24/2024           Assessment and Plan:       Ankle fracture, bimalleolar, closed, left, initial encounter    CAD (coronary artery disease)    Essential hypertension    Dyslipidemia    GERD (gastroesophageal reflux disease)    Anxiety    " Depression    CKD (chronic kidney disease) stage 3, GFR 30-59 ml/min      Plan  1. PT/OT.  Weightbearing per orthopedic surgery.  2. Pain control-prns, multimodal approach.   3. IS-encourage  4. DVT proph- mechanicals and aspirin.  5. Bowel regimen  6. Resume home medications as appropriate  7. Monitor post-op labs  8. DC planning for home versus inpatient rehab    - Hypertension:  Resume home medications as appropriate, formulary substitution when indicated.  Holding parameters.  PRN medications for elevated blood pressure.     -GERD:  Resume PPI.  Formulary substitution when indicated.    -Dyslipidemia:  Resume home regimen statin ( formulary substitution when appropriate).    -CKD: Monitor renal function closely.  Avoid nephrotoxic agents.    -Depression and anxiety: Maintained on home regimen including sertraline and Wellbutrin XL.      Dragon disclaimer:  Part of this encounter note is an electronic transcription/translation of spoken language to printed text. The electronic translation of spoken language may permit erroneous, or at times, nonsensical words or phrases to be inadvertently transcribed; Although I have reviewed the note for such errors, some may still exist.    Anne Zuniga MD  10/25/24  21:23 EDT

## 2024-10-26 NOTE — PROGRESS NOTES
"Orthopedic Daily Progress Note      CC: Postop day #1 status post ORIF left ankle bimalleolar ankle fracture    Pain  controlled      No other complaints    Physical Exam:  I have reviewed the vital signs.  Temp:  [97 °F (36.1 °C)-98.7 °F (37.1 °C)] 97.7 °F (36.5 °C)  Heart Rate:  [76-98] 87  Resp:  [14-18] 18  BP: (128-159)/(69-98) 151/86    Objective:  Vital signs: (most recent): Blood pressure 151/86, pulse 87, temperature 97.7 °F (36.5 °C), temperature source Oral, resp. rate 18, height 162.6 cm (64.02\"), weight 77.6 kg (171 lb 1.2 oz), SpO2 98%.              General Appearance:    Alert, cooperative, no distress    Skin: Dressing Clean/dry/intact  Wiggles toes  Foot is warm well-perfused    Results Review:    I have reviewed the labs, radiology results and diagnostic studies:      Results from last 7 days   Lab Units 10/24/24  1352   WBC 10*3/mm3 8.34   HEMOGLOBIN g/dL 11.5*   PLATELETS 10*3/mm3 357     Results from last 7 days   Lab Units 10/24/24  1352   SODIUM mmol/L 138   POTASSIUM mmol/L 4.5   CO2 mmol/L 23.0   CREATININE mg/dL 1.24*   GLUCOSE mg/dL 121*       I have reviewed the medications.    Assessment/Problem List  POD# 1 Day Post-Op   S/p ORIF of left ankle bimalleolar ankle fracture    Plan  Analgesia as needed  DVT prophylaxis-aspirin 81 mg daily x 4 weeks  PT OT-nonweightbearing to left lower extremity.  Will need assessment for home versus rehab.  Okay to discharge when appropriate discharge planning has been established.  See discharge instructions below          Post-Operative Instructions:  Alex Martinez MD.      LOWER EXTREMITY SURGERY    Nerve Block Instructions:  You may have had a nerve block prior to surgery.  If so, you will not have control over the leg until the block has worn off (approx. 6-24 hours).  You should protect your leg during this time.   You may develop some tenderness at the block site.  You may be able to soothe this area with warm, moist heat.    Post-Operative Care:  " Leave the splint on and intact until you are seen at your first follow-up visit    Showering/Bathing:  Please keep the dressing and incisions clean and dry at all times until you are seen at your first follow-up visit.    Activity:  Non-weightbearing to lower extremity.  Elevate the extremity is much as possible this will help with swelling and pain control.    Medications/Pain Control:  Ice the area several times per day for 20 minutes at a time (minimum of 20 minutes between sessions)  Ice will help control swelling and pain  We recommend you take Tylenol 650 mg every 6 hours for the first 3-5 days.   You may also take ibuprofen 600 mg every 8 hours as needed for pain.    You will be discharged with a prescription for narcotic pain medication. We recommend you take the pain medication as prescribed for the first 24-48 hours, then decrease as tolerated.    Even with pain medication, some discomfort or pain is normal, however if you are experiencing a significant increase in pain, please notify our office.  Please purchase an over the counter stool softener (i.e. senna, docusate) to help prevent constipation (please take as instructed on the package).  Please Increase your fluids to prevent post-operative constipation.       Infection Prevention  Infection prevention is always one of our primary goals.  You received an antibiotic through your IV at the time of surgery and will not need an oral antibiotic  It is normal to have swelling, discomfort, some heat around the surgery site, a low-grade fever (<100 degrees), and clear/pink drainage.  Notify our office if, after 24 hours, you have marked and sudden increase in swelling, pain, or heat in the joint or white/yellow discharge or persistent fever > 100 degrees after Tylenol  You should take Aspirin 81mg daily for 4 weeks to limit the risk of blood clot. You should discontinue this if any stomach irritation develops.  Please do not use if you have any history of  stomach ulcers or other adverse reaction to NSAIDs        Follow-Up  We recommend that you not drive until you are off all pain medication and can safely operate a vehicle.    Please return to the office at your scheduled appointment time.  If you do not have an appointment scheduled, please call the office to schedule an appointment for 10-14 days after surgery unless otherwise specified below.  Please call [phone] if you need to schedule or change your appointment.    Additional Instructions:       If you have any questions or concerns, please contact [facility] at [phone]        Alex Martinez MD  10/26/24  07:48 EDT

## 2024-10-26 NOTE — PLAN OF CARE
Goal Outcome Evaluation:  Plan of Care Reviewed With: patient, child        Progress: no change  Outcome Evaluation: PT initial evaluation complete. Pt presenting well below her baseline level of function with decreased funcitonal activity tolerance, generalized weakness, and mobility deficits related to LLE NWB status. Pt ambulated 11ft with RW and MinAx1, demonstrating good ability to maintain NWB throughout. PT reviewed BLE HEP. PT recommending inpatient rehab at [place].    Anticipated Discharge Disposition (PT): inpatient rehabilitation facility

## 2024-10-26 NOTE — PROGRESS NOTES
[facility]    Acute pain service Inpatient Progress Note    Patient Name: Nallely Mccabe  :  1950  MRN:  [id number]        Acute Pain  Service Inpatient Progress Note:    Analgesia:Excellent  Pain Score:0/10  LOC: alert and awake  Resp Status: room air  Cardiac: VS stable  Side Effects:None  Catheter Site:clean, dry and dressing intact  Cath type: peripheral nerve cath(InfuSystem)  Infusion rate: Ext/Pop: Basal: 1ml/hr, PIB: 5ml q 2 h, PCA: 5 ml q 30 min  Catheter Plan:Catheter to remain Insitu and Continue catheter infusion rate unchanged

## 2024-10-26 NOTE — PLAN OF CARE
Goal Outcome Evaluation:  Plan of Care Reviewed With: patient        Progress: no change        Pt arrived from the PACU at shift change. She is alert and oriented X 4. VSS. RA/2L. Denied pain. Infublock in place. LLE elevated with pillows as ordered.

## 2024-10-26 NOTE — PROGRESS NOTES
"IM progress note      Nallely Mccabe  [id number]  1950     LOS: 0 days     Attending: Alex Martinez MD    Primary Care Provider: Shay Rivera MD      Chief Complaint/Reason for visit:  No chief complaint on file.      Subjective   Doing well this morning.  Good pain control.  No complains of nausea, vomiting, or shortness of breath.    Objective        Visit Vitals  /86   Pulse 87   Temp 97.7 °F (36.5 °C) (Oral)   Resp 18   Ht 162.6 cm (64.02\")   Wt 77.6 kg (171 lb 1.2 oz)   SpO2 98%   BMI 29.35 kg/m²     Temp (24hrs), Av.7 °F (36.5 °C), Min:97 °F (36.1 °C), Max:98.7 °F (37.1 °C)      Intake/Output:    Intake/Output Summary (Last 24 hours) at 10/26/2024 0732  Last data filed at 10/25/2024 2228  Gross per 24 hour   Intake --   Output 700 ml   Net -700 ml        Physical Therapy: Pending    Physical Exam:     General Appearance:    Alert, cooperative, in no acute distress   Head:    Normocephalic, without obvious abnormality, atraumatic    Lungs:     Normal effort, symmetric chest rise,  clear to      auscultation bilaterally              Heart:    Regular rhythm and normal rate, normal S1 and S2    Abdomen:     Normal bowel sounds, no masses, no organomegaly, soft        nontender, nondistended, no guarding, no rebound                tenderness   Extremities: Clean dry and intact splint/dressing left lower extremity.  Peripheral nerve block catheter present.  Able to wiggle toes.      Pulses:   Pulses palpable and equal bilaterally   Skin:   No bleeding, bruising or rash          Results Review:     I reviewed the patient's new clinical results.   Results from last 7 days   Lab Units 10/24/24  1352   WBC 10*3/mm3 8.34   HEMOGLOBIN g/dL 11.5*   HEMATOCRIT % 35.5   PLATELETS 10*3/mm3 357     Results from last 7 days   Lab Units 10/24/24  1352   SODIUM mmol/L 138   POTASSIUM mmol/L 4.5   CHLORIDE mmol/L 102   CO2 mmol/L 23.0   BUN mg/dL 15   CREATININE mg/dL 1.24*   CALCIUM mg/dL 8.7 "   BILIRUBIN mg/dL 0.5   ALK PHOS U/L 86   ALT (SGPT) U/L 8   AST (SGOT) U/L 16   GLUCOSE mg/dL 121*     I reviewed the patient's new imaging including images and reports.    Today's labs, pending      All medications reviewed.   aspirin, 81 mg, Oral, Daily  buPROPion XL, 300 mg, Oral, Daily  carboxymethylcellulose, 2 drop, Both Eyes, TID  carvedilol, 12.5 mg, Oral, BID With Meals  cholecalciferol, 2,000 Units, Oral, Daily  clopidogrel, 75 mg, Oral, Daily  fluticasone, 1 spray, Nasal, Daily  losartan, 100 mg, Oral, Nightly  meloxicam, 7.5 mg, Oral, Daily  pantoprazole, 40 mg, Oral, Q AM  pravastatin, 20 mg, Oral, Daily  sertraline, 75 mg, Oral, Daily      acetaminophen, 650 mg, Q4H PRN   Or  acetaminophen, 650 mg, Q4H PRN  ALPRAZolam, 1 mg, TID PRN  benzonatate, 100 mg, Q8H PRN  HYDROmorphone, 0.5 mg, Q2H PRN   And  naloxone, 0.4 mg, Q5 Min PRN  labetalol, 10 mg, Q4H PRN  ondansetron, 4 mg, Q6H PRN  ondansetron ODT, 4 mg, Q6H PRN  oxyCODONE-acetaminophen, 1 tablet, Q4H PRN  oxyCODONE-acetaminophen, 1 tablet, Q4H PRN  sodium chloride, 500 mL, TID PRN        Assessment & Plan       S/P ORIF (open reduction internal fixation) fracture, left ankle bimalleolar    CAD (coronary artery disease)    Essential hypertension    Dyslipidemia    GERD (gastroesophageal reflux disease)    Anxiety    Depression    Ankle fracture, bimalleolar, closed, left, initial encounter    CKD (chronic kidney disease) stage 3, GFR 30-59 ml/min         Plan     1. PT/OT.  Order placed.   2. Pain control-prns, multimodal approach.      3. IS-encourage  4. DVT proph- mechanicals and aspirin.  5. Bowel regimen  6. Resume home medications as appropriate  7. Monitor post-op labs  8. DC planning for home versus inpatient rehab     - Hypertension:  Resume home medications as appropriate, formulary substitution when indicated.  Holding parameters.  PRN medications for elevated blood pressure.      -GERD:  Resume PPI.  Formulary substitution when  indicated.     -Dyslipidemia:  Resume home regimen statin ( formulary substitution when appropriate).     -CKD: Monitor renal function closely.  Avoid nephrotoxic agents.     -Depression and anxiety: Maintained on home regimen including sertraline and Wellbutrin XL.    Anne Zuniga MD  10/26/24  07:32 EDT

## 2024-10-26 NOTE — PLAN OF CARE
Goal Outcome Evaluation:  Plan of Care Reviewed With: patient        Progress: no change  Outcome Evaluation: Splint with ace CDI. Left lower extremity elevated on pillows at all times. Non weigth bearing status observed. Up to recliner and tolerates well. Pain managed with nerve cath. Pt using bedside commode today. Requesting Purewick for night time due to frequency of urination at night. Waiting a discharge plan when ready.

## 2024-10-26 NOTE — THERAPY EVALUATION
Patient Name: Nallely Mccabe  : 1950    MRN: [id number]                              Today's Date: 10/26/2024       Admit Date: 10/25/2024    Visit Dx: No diagnosis found.  Patient Active Problem List   Diagnosis    CAD (coronary artery disease)    Essential hypertension    Dyslipidemia    GERD (gastroesophageal reflux disease)    Anxiety    Depression    Obesity (BMI 30-39.9)    Metabolic encephalopathy    Acute hyponatremia    Fall    Rib fractures    Bacteriuria    Ankle fracture, bimalleolar, closed, left, initial encounter    CKD (chronic kidney disease) stage 3, GFR 30-59 ml/min    S/P ORIF (open reduction internal fixation) fracture, left ankle bimalleolar     Past Medical History:   Diagnosis Date    Anxiety     CAD (coronary artery disease)     CKD (chronic kidney disease) stage 3, GFR 30-59 ml/min     Depression     GERD (gastroesophageal reflux disease)     Hyperlipidemia     Hypertension     Myocardial infarction 10/18/2011    Obesity (BMI 30.0-34.9)     Pyelonephritis     Pyelonephritis, acute 2018    Right sided    Sepsis     Sepsis 2018    UTI (urinary tract infection)      Past Surgical History:   Procedure Laterality Date    CARDIAC CATHETERIZATION  10/18/2011    COLONOSCOPY      CORONARY ANGIOPLASTY  10/18/2011    EYE SURGERY      ORIF HUMERUS FRACTURE      POLYPECTOMY      SINUS SURGERY        General Information       Row Name 10/26/24 1159          Physical Therapy Time and Intention    Document Type evaluation  -     Mode of Treatment physical therapy;individual therapy  -       Row Name 10/26/24 1151          General Information    Patient Profile Reviewed yes  -     Prior Level of Function independent:;all household mobility;community mobility;gait;transfer;bed mobility  -     Existing Precautions/Restrictions left;non-weight bearing  L popliteal n catheter  -     Barriers to Rehab environmental barriers  -       Row Name 10/26/24 1150          Living  Environment    People in Home alone;other (see comments)  daughter can stay with pt as needed.  -       Row Name 10/26/24 1159          Home Main Entrance    Number of Stairs, Main Entrance four  -     Stair Railings, Main Entrance railing on right side (ascending)  -       Row Name 10/26/24 1159          Stairs Within Home, Primary    Number of Stairs, Within Home, Primary none  -[facility] Name 10/26/24 1159          Cognition    Orientation Status (Cognition) oriented x 4  -[facility] Name 10/26/24 1159          Safety Issues/Impairments Affecting Functional Mobility    Safety Issues Affecting Function (Mobility) awareness of need for assistance;insight into deficits/self-awareness;safety precaution awareness;safety precautions follow-through/compliance;sequencing abilities  -     Impairments Affecting Function (Mobility) balance;endurance/activity tolerance;strength  -               User Key  (r) = Recorded By, (t) = Taken By, (c) = Cosigned By      Initials Name Provider Type     Bill Levy, PT Physical Therapist                   Mobility       Row Name 10/26/24 1201          Bed Mobility    Bed Mobility supine-sit  -     Supine-Sit [place] (Bed Mobility) standby assist;verbal cues  -     Assistive Device (Bed Mobility) head of bed elevated;bed rails  -     Comment, (Bed Mobility) pt with good bed mobility technique, mild increase in time to achieve sitting EOB. Declined dizziness/light headedness.  -[facility] Name 10/26/24 1201          Sit-Stand Transfer    Sit-Stand [place] (Transfers) contact guard;1 person assist;verbal cues  -     Assistive Device (Sit-Stand Transfers) walker, front-wheeled  -     Comment, (Sit-Stand Transfer) Pt performed STS from EOB, requiring cues for proper set up and positioning of RW. Pt required cues to advanced LLE forward prior to transfer to maintain NWB precautions. Pt required cues for upright posture.  -       Row Name 10/26/24  1201          Gait/Stairs (Locomotion)    [place] Level (Gait) 1 person assist;verbal cues;nonverbal cues (demo/gesture);1 person to manage equipment  chair follow for safety.  -     Assistive Device (Gait) walker, front-wheeled  -     Patient was able to Ambulate yes  -     Distance in Feet (Gait) 11  -     Deviations/Abnormal Patterns (Gait) festinating/shuffling;gait speed decreased;stride length decreased  -     Bilateral Gait Deviations forward flexed posture  -     [place] Level (Stairs) unable to assess  -     Comment, (Gait/Stairs) Pt demonstrated hop to gait pattern with RW, pt demonstrated good ability to maintain LLE NWB precautions throughout. Pt required occasional cues for proper UE support on RW and for RW positioning. Pt with declining R foot clearance during step with increasing fatigue. Distance limited d/t fatigue.  -       Row Name 10/26/24 1201          Mobility    Extremity Weight-bearing Status left lower extremity  -     Left Lower Extremity (Weight-bearing Status) non weight-bearing (NWB)   -               User Key  (r) = Recorded By, (t) = Taken By, (c) = Cosigned By      Initials Name Provider Type     Bill Levy, PT Physical Therapist                   Obj/Interventions       Row Name 10/26/24 1205          Range of Motion Comprehensive    General Range of Motion bilateral lower extremity ROM WFL  -     Comment, General Range of Motion L foot/ankle in short leg splint  -       Row Name 10/26/24 1205          Strength Comprehensive (MMT)    General Manual Muscle Testing (MMT) Assessment lower extremity strength deficits identified  -     Comment, General Manual Muscle Testing (MMT) Assessment RLE strength grossly 4/5, L hip/knee 4/5, post op L ankle not tested  -       Row Name 10/26/24 1205          Motor Skills    Therapeutic Exercise hip;knee;ankle  -       Row Name 10/26/24 1205          Hip (Therapeutic Exercise)    Hip (Therapeutic  Exercise) AROM (active range of motion)  -     Hip AROM (Therapeutic Exercise) bilateral;flexion;extension;10 repetitions  -       Row Name 10/26/24 1205          Knee (Therapeutic Exercise)    Knee (Therapeutic Exercise) AROM (active range of motion)  -     Knee AROM (Therapeutic Exercise) bilateral;LAQ (long arc quad);sitting;10 repetitions;SLR (straight leg raise)  -       Row Name 10/26/24 1205          Ankle (Therapeutic Exercise)    Ankle (Therapeutic Exercise) AROM (active range of motion)  -     Ankle AROM (Therapeutic Exercise) right;dorsiflexion;plantarflexion;10 repetitions  -       Row Name 10/26/24 1205          Balance    Balance Assessment sitting static balance;sitting dynamic balance;standing static balance;standing dynamic balance  -     Static Sitting Balance supervision  -     Dynamic Sitting Balance standby assist  -     Position, Sitting Balance unsupported;sitting edge of bed;sitting in chair  -     Static Standing Balance contact guard  -     Dynamic Standing Balance minimal assist  -     Position/Device Used, Standing Balance supported;walker, front-wheeled  -     Comment, Balance Pt with no gross LOB throughout, mild instability during ambulation.  -       Row Name 10/26/24 1205          Sensory Assessment (Somatosensory)    Left LE Sensory Assessment general sensation;impaired  -     Right LE Sensory Assessment intact  -               User Key  (r) = Recorded By, (t) = Taken By, (c) = Cosigned By      Initials Name Provider Type     Bill Levy, PT Physical Therapist                   Goals/Plan       Row Name 10/26/24 1210          Bed Mobility Goal 1 (PT)    Activity/Assistive Device (Bed Mobility Goal 1, PT) sit to supine/supine to sit  [facility]     [place] Level/Cues Needed (Bed Mobility Goal 1, PT) independent  -     Time Frame (Bed Mobility Goal 1, PT) short term goal (STG);3 days  -       Row Name 10/26/24 1210          Transfer Goal 1 (PT)     Activity/Assistive Device (Transfer Goal 1, PT) sit-to-stand/stand-to-sit;bed-to-chair/chair-to-bed;walker, rolling  -     [place] Level/Cues Needed (Transfer Goal 1, PT) contact guard required  -     Time Frame (Transfer Goal 1, PT) short term goal (STG);3 days  -       Row Name 10/26/24 1210          Gait Training Goal 1 (PT)    Activity/Assistive Device (Gait Training Goal 1, PT) gait (walking locomotion);assistive device use;walker, rolling  -     [place] Level (Gait Training Goal 1, PT) contact guard required  -     Distance (Gait Training Goal 1, PT) 25  -LH     Time Frame (Gait Training Goal 1, PT) long term goal (LTG);1 week  -       Row Name 10/26/24 1210          Therapy Assessment/Plan (PT)    Planned Therapy Interventions (PT) balance training;gait training;bed mobility training;home exercise program;patient/family education;postural re-education;strengthening;transfer training;wheelchair management/propulsion training  -               User Key  (r) = Recorded By, (t) = Taken By, (c) = Cosigned By      Initials Name Provider Type     Bill Levy, PT Physical Therapist                   Clinical Impression       Row Name 10/26/24 1208          Pain    Pretreatment Pain Rating 0/10 - no pain  -     Posttreatment Pain Rating 0/10 - no pain  -       Row Name 10/26/24 1208          Plan of Care Review    Plan of Care Reviewed With patient;child  -     Progress no change  -     Outcome Evaluation PT initial evaluation complete. Pt presenting well below her baseline level of function with decreased funcitonal activity tolerance, generalized weakness, and mobility deficits related to LLE NWB status. Pt ambulated 11ft with RW and MinAx1, demonstrating good ability to maintain NWB throughout. PT reviewed BLE HEP. PT recommending inpatient rehab at [place].  -       Row Name 10/26/24 1203          Therapy Assessment/Plan (PT)    Patient/Family Therapy Goals Statement (PT)  Improve mobility, go to rehab to improve strength.  -     Rehab Potential (PT) good  -     Criteria for Skilled Interventions Met (PT) yes;meets criteria;skilled treatment is necessary  -     Therapy Frequency (PT) daily  -     Predicted Duration of Therapy Intervention (PT) 4 days.  -       Row Name 10/26/24 1208          Vital Signs    Pre Systolic BP Rehab 157  -     Pre Treatment Diastolic BP 83  -LH     Pre SpO2 (%) 100  -LH     O2 Delivery Pre Treatment nasal cannula  -     Post SpO2 (%) 98  -LH     O2 Delivery Post Treatment room air  -     Pre Patient Position Supine  -     Intra Patient Position Standing  -     Post Patient Position Sitting  -       Row Name 10/26/24 1208          Positioning and Restraints    Pre-Treatment Position in bed  -     Post Treatment Position chair  -     In Chair notified nsg;reclined;call light within reach;with family/caregiver;encouraged to call for assist;exit alarm on;waffle cushion;on mechanical lift sling;legs elevated;heels elevated;compression device  -               User Key  (r) = Recorded By, (t) = Taken By, (c) = Cosigned By      Initials Name Provider Type     Bill Levy, PT Physical Therapist                   Outcome Measures       Row Name 10/26/24 1211          How much help from another person do you currently need...    Turning from your back to your side while in flat bed without using bedrails? 4  -LH     Moving from lying on back to sitting on the side of a flat bed without bedrails? 4  -LH     Moving to and from a bed to a chair (including a wheelchair)? 3  -LH     Standing up from a chair using your arms (e.g., wheelchair, bedside chair)? 3  -LH     Climbing 3-5 steps with a railing? 1  -LH     To walk in hospital room? 3  -LH     AM-PAC 6 Clicks Score (PT) 18  -     Highest Level of Mobility Goal 6 --> Walk 10 steps or more  -       Row Name 10/26/24 1211          Functional Assessment    Outcome Measure Options  AM-PAC 6 Clicks Basic Mobility (PT)  -               User Key  (r) = Recorded By, (t) = Taken By, (c) = Cosigned By      Initials Name Provider Type     Bill Levy PT Physical Therapist                                 Physical Therapy Education       Title: PT OT SLP Therapies (In Progress)       Topic: Physical Therapy (Done)       Point: Mobility training (Done)       Learning Progress Summary            Patient Eager, E,D, VU,DU,NR by  at 10/26/2024 1211    Comment: Reviewed LLE NWB status, HEP, PT POC, and DC recommendations.   Other Eager, E,D, VU,DU,NR by  at 10/26/2024 1211    Comment: Reviewed LLE NWB status, HEP, PT POC, and DC recommendations.                      Point: Home exercise program (Done)       Learning Progress Summary            Patient Eager, E,D, VU,DU,NR by  at 10/26/2024 1211    Comment: Reviewed LLE NWB status, HEP, PT POC, and DC recommendations.   Other Eager, E,D, VU,DU,NR by  at 10/26/2024 1211    Comment: Reviewed LLE NWB status, HEP, PT POC, and DC recommendations.                      Point: Body mechanics (Done)       Learning Progress Summary            Patient Eager, E,D, VU,DU,NR by  at 10/26/2024 1211    Comment: Reviewed LLE NWB status, HEP, PT POC, and DC recommendations.   Other Eager, E,D, VU,DU,NR by  at 10/26/2024 1211    Comment: Reviewed LLE NWB status, HEP, PT POC, and DC recommendations.                      Point: Precautions (Done)       Learning Progress Summary            Patient Eager, E,D, VU,DU,NR by  at 10/26/2024 1211    Comment: Reviewed LLE NWB status, HEP, PT POC, and DC recommendations.   Other Eager, E,D, VU,DU,NR by  at 10/26/2024 1211    Comment: Reviewed LLE NWB status, HEP, PT POC, and DC recommendations.                                      User Key       Initials Effective Dates Name Provider Type [facility] 09/21/21 -  Bill Levy PT Physical Therapist PT                  PT Recommendation and Plan  Planned  Therapy Interventions (PT): balance training, gait training, bed mobility training, home exercise program, patient/family education, postural re-education, strengthening, transfer training, wheelchair management/propulsion training  Progress: no change  Outcome Evaluation: PT initial evaluation complete. Pt presenting well below her baseline level of function with decreased funcitonal activity tolerance, generalized weakness, and mobility deficits related to LLE NWB status. Pt ambulated 11ft with RW and MinAx1, demonstrating good ability to maintain NWB throughout. PT reviewed BLE HEP. PT recommending inpatient rehab at [place].     Time Calculation:   PT Evaluation Complexity  History, PT Evaluation Complexity: 1-2 personal factors and/or comorbidities  Examination of Body Systems (PT Eval Complexity): total of 3 or more elements  Clinical Presentation (PT Evaluation Complexity): stable  Clinical Decision Making (PT Evaluation Complexity): low complexity  Overall Complexity (PT Evaluation Complexity): low complexity     PT Charges       Row Name 10/26/24 1212             Time Calculation    Start Time 1106  -LH      PT Received On 10/26/24  -      PT Goal Re-Cert Due Date 11/05/24  -         Timed Charges    86324 - PT Therapeutic Exercise Minutes 4  -LH      77759 - Gait Training Minutes  6  -LH         Untimed Charges    PT Eval/Re-eval Minutes 49  -LH         Total Minutes    Timed Charges Total Minutes 10  -LH      Untimed Charges Total Minutes 49  -LH       Total Minutes 59  -LH                User Key  (r) = Recorded By, (t) = Taken By, (c) = Cosigned By      Initials Name Provider Type     Bill Levy, PT Physical Therapist                  Therapy Charges for Today       Code Description Service Date Service Provider Modifiers Qty    55771034979 HC PT EVAL LOW COMPLEXITY 4 10/26/2024 Bill Levy, PT GP 1    61690480857 HC PT THER PROC EA 15 MIN 10/26/2024 Bill Levy, PT GP 1            PT  G-Codes  Outcome Measure Options: AM-PAC 6 Clicks Basic Mobility (PT)  AM-PAC 6 Clicks Score (PT): 18  PT Discharge Summary  Anticipated Discharge Disposition (PT): inpatient rehabilitation facility    Bill Levy, PT  10/26/2024

## 2024-10-27 PROCEDURE — A9270 NON-COVERED ITEM OR SERVICE: HCPCS | Performed by: ORTHOPAEDIC SURGERY

## 2024-10-27 PROCEDURE — 63710000001 CHOLECALCIFEROL 25 MCG (1000 UT) TABLET: Performed by: ORTHOPAEDIC SURGERY

## 2024-10-27 PROCEDURE — 63710000001 PRAVASTATIN 20 MG TABLET: Performed by: ORTHOPAEDIC SURGERY

## 2024-10-27 PROCEDURE — 63710000001 SERTRALINE 50 MG TABLET: Performed by: ORTHOPAEDIC SURGERY

## 2024-10-27 PROCEDURE — 63710000001 FLUTICASONE 50 MCG/ACT SUSPENSION 16 G BOTTLE: Performed by: ORTHOPAEDIC SURGERY

## 2024-10-27 PROCEDURE — 97116 GAIT TRAINING THERAPY: CPT

## 2024-10-27 PROCEDURE — 63710000001 PANTOPRAZOLE 40 MG TABLET DELAYED-RELEASE: Performed by: ORTHOPAEDIC SURGERY

## 2024-10-27 PROCEDURE — 63710000001 ASPIRIN 81 MG TABLET DELAYED-RELEASE: Performed by: ORTHOPAEDIC SURGERY

## 2024-10-27 PROCEDURE — 97165 OT EVAL LOW COMPLEX 30 MIN: CPT

## 2024-10-27 PROCEDURE — 63710000001 BUPROPION XL 150 MG TABLET SUSTAINED-RELEASE 24 HOUR: Performed by: ORTHOPAEDIC SURGERY

## 2024-10-27 PROCEDURE — 63710000001 SERTRALINE 25 MG TABLET: Performed by: ORTHOPAEDIC SURGERY

## 2024-10-27 PROCEDURE — 63710000001 OXYCODONE-ACETAMINOPHEN 5-325 MG TABLET: Performed by: ORTHOPAEDIC SURGERY

## 2024-10-27 PROCEDURE — 63710000001 MELOXICAM 15 MG TABLET: Performed by: ORTHOPAEDIC SURGERY

## 2024-10-27 PROCEDURE — 63710000001 ALPRAZOLAM 1 MG TABLET: Performed by: ORTHOPAEDIC SURGERY

## 2024-10-27 PROCEDURE — 63710000001 LOSARTAN 50 MG TABLET: Performed by: ORTHOPAEDIC SURGERY

## 2024-10-27 PROCEDURE — 63710000001 CARVEDILOL 12.5 MG TABLET: Performed by: ORTHOPAEDIC SURGERY

## 2024-10-27 PROCEDURE — 63710000001 CLOPIDOGREL 75 MG TABLET: Performed by: ORTHOPAEDIC SURGERY

## 2024-10-27 RX ADMIN — MELOXICAM 7.5 MG: 15 TABLET ORAL at 14:49

## 2024-10-27 RX ADMIN — PRAVASTATIN SODIUM 20 MG: 20 TABLET ORAL at 20:54

## 2024-10-27 RX ADMIN — BUPROPION HYDROCHLORIDE 300 MG: 150 TABLET, EXTENDED RELEASE ORAL at 09:12

## 2024-10-27 RX ADMIN — OXYCODONE HYDROCHLORIDE AND ACETAMINOPHEN 1 TABLET: 5; 325 TABLET ORAL at 04:08

## 2024-10-27 RX ADMIN — LOSARTAN POTASSIUM 100 MG: 50 TABLET, FILM COATED ORAL at 20:54

## 2024-10-27 RX ADMIN — OXYCODONE HYDROCHLORIDE AND ACETAMINOPHEN 1 TABLET: 5; 325 TABLET ORAL at 09:12

## 2024-10-27 RX ADMIN — Medication 2000 UNITS: at 09:12

## 2024-10-27 RX ADMIN — SERTRALINE 75 MG: 50 TABLET, FILM COATED ORAL at 09:12

## 2024-10-27 RX ADMIN — ALPRAZOLAM 1 MG: 1 TABLET ORAL at 17:29

## 2024-10-27 RX ADMIN — ALPRAZOLAM 1 MG: 1 TABLET ORAL at 09:18

## 2024-10-27 RX ADMIN — CARVEDILOL 12.5 MG: 12.5 TABLET, FILM COATED ORAL at 09:12

## 2024-10-27 RX ADMIN — CLOPIDOGREL BISULFATE 75 MG: 75 TABLET ORAL at 09:12

## 2024-10-27 RX ADMIN — OXYCODONE HYDROCHLORIDE AND ACETAMINOPHEN 1 TABLET: 5; 325 TABLET ORAL at 14:49

## 2024-10-27 RX ADMIN — OXYCODONE HYDROCHLORIDE AND ACETAMINOPHEN 1 TABLET: 5; 325 TABLET ORAL at 20:55

## 2024-10-27 RX ADMIN — ASPIRIN 81 MG: 81 TABLET, COATED ORAL at 09:12

## 2024-10-27 RX ADMIN — FLUTICASONE PROPIONATE 1 SPRAY: 50 SPRAY, METERED NASAL at 09:19

## 2024-10-27 RX ADMIN — CARVEDILOL 12.5 MG: 12.5 TABLET, FILM COATED ORAL at 17:29

## 2024-10-27 RX ADMIN — PANTOPRAZOLE SODIUM 40 MG: 40 TABLET, DELAYED RELEASE ORAL at 05:43

## 2024-10-27 NOTE — PROGRESS NOTES
[facility]    Acute pain service Inpatient Progress Note    Patient Name: Nallely Mccabe  :  1950  MRN:  [id number]        Acute Pain  Service Inpatient Progress Note:    Analgesia:Good  LOC: alert and awake  Resp Status: spontaneous ventilation  Cardiac: VS stable  Side Effects:None  Catheter Site:clean, dressing intact and dry  Cath type: peripheral nerve cath(InfuSystem)  Infusion rate: Ext/Pop: Basal: 1ml/hr, PIB: 5ml q 2 h, PCA: 5 ml q 30 min (1mL,8ml, 8ml InfuSystem Pump)  Dosing/Volume: ropivacaine 0.2%  Catheter Plan:Catheter to remain Insitu and Continue catheter infusion rate unchanged  Comments: Pt  resting comfortably on arrival.  VAS 1, no new concerns, continue current infusion.

## 2024-10-27 NOTE — THERAPY EVALUATION
Patient Name: Nallely Mccabe  : 1950    MRN: [id number]                              Today's Date: 10/27/2024       Admit Date: 10/25/2024    Visit Dx: No diagnosis found.  Patient Active Problem List   Diagnosis    CAD (coronary artery disease)    Essential hypertension    Dyslipidemia    GERD (gastroesophageal reflux disease)    Anxiety    Depression    Obesity (BMI 30-39.9)    Metabolic encephalopathy    Acute hyponatremia    Fall    Rib fractures    Bacteriuria    Ankle fracture, bimalleolar, closed, left, initial encounter    CKD (chronic kidney disease) stage 3, GFR 30-59 ml/min    S/P ORIF (open reduction internal fixation) fracture, left ankle bimalleolar     Past Medical History:   Diagnosis Date    Anxiety     CAD (coronary artery disease)     CKD (chronic kidney disease) stage 3, GFR 30-59 ml/min     Depression     GERD (gastroesophageal reflux disease)     Hyperlipidemia     Hypertension     Myocardial infarction 10/18/2011    Obesity (BMI 30.0-34.9)     Pyelonephritis     Pyelonephritis, acute 2018    Right sided    Sepsis     Sepsis 2018    UTI (urinary tract infection)      Past Surgical History:   Procedure Laterality Date    CARDIAC CATHETERIZATION  10/18/2011    COLONOSCOPY      CORONARY ANGIOPLASTY  10/18/2011    EYE SURGERY      ORIF HUMERUS FRACTURE      POLYPECTOMY      SINUS SURGERY        General Information       Row Name 10/27/24 1354          OT Time and Intention    Document Type evaluation (P)   -     Mode of Treatment occupational therapy (P)   -       Row Name 10/27/24 3290          General Information    Patient Profile Reviewed yes (P)   -BH     Prior Level of Function independent:;all household mobility;transfer;bed mobility;ADL's (P)   -BH     Existing Precautions/Restrictions left;non-weight bearing;fall (P)   -BH     Barriers to Rehab none identified (P)   -BH       Row Name 10/27/24 5535          Living Environment    People in Home alone;other (see  comments) (P)   daughter can stay with pt as needed  -[facility] Name 10/27/24 1354          Home Main Entrance    Number of Stairs, Main Entrance four (P)   -     Stair Railings, Main Entrance railing on right side (ascending) (P)   -[facility] Name 10/27/24 1354          Stairs Within Home, Primary    Number of Stairs, Within Home, Primary none;other (see comments) (P)   All needs met on main floor  -[facility] Name 10/27/24 1354          Cognition    Orientation Status (Cognition) oriented x 3 (P)   -[facility] Name 10/27/24 1354          Safety Issues/Impairments Affecting Functional Mobility    Safety Issues Affecting Function (Mobility) awareness of need for assistance;insight into deficits/self-awareness;positioning of assistive device;safety precaution awareness;safety precautions follow-through/compliance (P)   -     Impairments Affecting Function (Mobility) balance;endurance/activity tolerance;strength;range of motion (ROM);pain (P)   -               User Key  (r) = Recorded By, (t) = Taken By, (c) = Cosigned By      Initials Name Provider Type     Juliet Reilly OT Student OT Student                     Mobility/ADL's       [facility] Name 10/27/24 1356          Bed Mobility    Bed Mobility supine-sit (P)   -     Supine-Sit [place] (Bed Mobility) standby assist;verbal cues (P)   -     Bed Mobility, Safety Issues decreased use of legs for bridging/pushing (P)   -     Assistive Device (Bed Mobility) head of bed elevated;bed rails (P)   -[facility] Name 10/27/24 1356          Transfers    Transfers sit-stand transfer;stand-sit transfer (P)   -     Comment, (Transfers) STS x1 from EOB, x1 from chair (P)   -[facility] Name 10/27/24 1356          Sit-Stand Transfer    Sit-Stand [place] (Transfers) contact guard;verbal cues;2 person assist (P)   -     Assistive Device (Sit-Stand Transfers) walker, front-wheeled (P)   -[facility] Name 10/27/24 1356          Stand-Sit  Transfer    Stand-Sit [place] (Transfers) contact guard;verbal cues;2 person assist (P)   -     Assistive Device (Stand-Sit Transfers) walker, front-wheeled (P)   [facility] Name 10/27/24 [facility]          Functional Mobility    Functional Mobility- Ind. Level contact guard assist;2 person assist required (P)   -     Functional Mobility- Device walker, front-wheeled (P)   -     Functional Mobility-Distance (Feet) -- (P)   HH distances  -     Functional Mobility-Maintain WBing able to maintain weight bearing status (P)   -     Functional Mobility- Safety Issues step length decreased (P)   -     Functional Mobility- Comment Pt required 1 seated rest break (P)   -     Patient was able to Ambulate yes (P)   -[facility] Name 10/27/24 [facility]          Activities of Daily Living    BADL Assessment/Intervention upper body dressing;grooming (P)   -BH       Row Name 10/27/24 [facility]          Mobility    Extremity Weight-bearing Status left lower extremity (P)   -     Left Lower Extremity (Weight-bearing Status) non weight-bearing (NWB) (P)   -BH       Row Name 10/27/24 [facility]          Upper Body Dressing Assessment/Training    [place] Level (Upper Body Dressing) don;minimum assist (75% patient effort) (P)   Gown  -     Position (Upper Body Dressing) edge of bed sitting (P)   -[facility] Name 10/27/24 [facility]          Grooming Assessment/Training    [place] Level (Grooming) hair care, combing/brushing;set up (P)   -     Position (Grooming) supported sitting (P)   [facility]               User Key  (r) = Recorded By, (t) = Taken By, (c) = Cosigned By      Initials Name Provider Type     Juliet Reilly OT Student OT Student                   Obj/Interventions       Row Name 10/27/24 7603          Sensory Assessment (Somatosensory)    Sensory Assessment (Somatosensory) left LE (P)   -     Left LE Sensory Assessment general sensation;impaired (P)   -[facility] Name 10/27/24 0940          Vision  Assessment/Intervention    Visual Impairment/Limitations WFL (P)   -       Row Name 10/27/24 1359          Range of Motion Comprehensive    General Range of Motion bilateral upper extremity ROM WFL (P)   -       Row Name 10/27/24 1359          Strength Comprehensive (MMT)    General Manual Muscle Testing (MMT) Assessment upper extremity strength deficits identified (P)   -     Comment, General Manual Muscle Testing (MMT) Assessment BUE MMT grossly 4/5 as observed through functional tasks (P)   -       Row Name 10/27/24 1359          Shoulder (Therapeutic Exercise)    Shoulder (Therapeutic Exercise) AROM (active range of motion) (P)   -     Shoulder AROM (Therapeutic Exercise) bilateral;scapular retraction;10 repetitions;other (see comments) (P)   w/ theraband  -       Row Name 10/27/24 1359          Elbow/Forearm (Therapeutic Exercise)    Elbow/Forearm (Therapeutic Exercise) AROM (active range of motion) (P)   -     Elbow/Forearm AROM (Therapeutic Exercise) bilateral;flexion;extension;10 repetitions;other (see comments) (P)   w/ theraband  -       Row Name 10/27/24 1359          Motor Skills    Therapeutic Exercise elbow/forearm;shoulder (P)   -       Row Name 10/27/24 1359          Balance    Balance Assessment sitting static balance;sitting dynamic balance;sit to stand dynamic balance;standing static balance;standing dynamic balance (P)   -     Static Sitting Balance independent (P)   -     Dynamic Sitting Balance standby assist (P)   -     Position, Sitting Balance unsupported;sitting edge of bed (P)   -     Sit to Stand Dynamic Balance contact guard;2-person assist (P)   -     Static Standing Balance contact guard (P)   -     Dynamic Standing Balance contact guard;2-person assist (P)   -     Position/Device Used, Standing Balance supported;walker, front-wheeled (P)   -     Balance Interventions sitting;standing;sit to stand;supported;static;dynamic;occupation based/functional  task (P)   -     Comment, Balance No overt LOB (P)   -               User Key  (r) = Recorded By, (t) = Taken By, (c) = Cosigned By      Initials Name Provider Type    Juliet Goel, OT Student OT Student                   Goals/Plan       Row Name 10/27/24 5092          Transfer Goal 1 (OT)    Activity/Assistive Device (Transfer Goal 1, OT) bed-to-chair/chair-to-bed;toilet (P)   -     [place] Level/Cues Needed (Transfer Goal 1, OT) standby assist (P)   -     Time Frame (Transfer Goal 1, OT) short term goal (STG);3 days (P)   -     Progress/Outcome (Transfer Goal 1, OT) new goal (P)   -       Row Name 10/27/24 6284          Dressing Goal 1 (OT)    Activity/Device (Dressing Goal 1, OT) lower body dressing (P)   -     [place]/Cues Needed (Dressing Goal 1, OT) minimum assist (75% or more patient effort) (P)   -     Time Frame (Dressing Goal 1, OT) short term goal (STG);3 days (P)   -     Progress/Outcome (Dressing Goal 1, OT) new goal (P)   -       Row Name 10/27/24 6742          Grooming Goal 1 (OT)    Activity/Device (Grooming Goal 1, OT) hair care;oral care;wash face, hands (P)   -     [place] (Grooming Goal 1, OT) standby assist (P)   [place]  -     Time Frame (Grooming Goal 1, OT) long term goal (LTG);5 days (P)   -     Progress/Outcome (Grooming Goal 1, OT) new goal (P)   -       Row Name 10/27/24 6218          Therapy Assessment/Plan (OT)    Planned Therapy Interventions (OT) activity tolerance training;adaptive equipment training;BADL retraining;functional balance retraining;occupation/activity based interventions;patient/caregiver education/training;ROM/therapeutic exercise;strengthening exercise;transfer/mobility retraining (P)   -               User Key  (r) = Recorded By, (t) = Taken By, (c) = Cosigned By      Initials Name Provider Type    Juliet Goel, OT Student OT Student                   Clinical Impression       Row Name 10/27/24 1400           Pain Assessment    Pretreatment Pain Rating 0/10 - no pain (P)   -     Posttreatment Pain Rating 0/10 - no pain (P)   -       Row Name 10/27/24 1400          Plan of Care Review    Plan of Care Reviewed With patient;daughter (P)   -     Progress no change (P)   -     Outcome Evaluation OT eval complete. Pt presenting below functional baseline with bed mobility, transfers, and ADL tasks d/t LLE NWB, ROM, and strength. Pt was CGA x2 with FWW w/ chair follow for functional mobility this date. Pt required 1 seated rest break d/t fatigue. Continue IP OT to address current deficits. Rec IRF at d/c for best functional outcome. (P)   -       Row Name 10/27/24 1400          Therapy Assessment/Plan (OT)    Patient/Family Therapy Goal Statement (OT) Return to PLOF (P)   -     Rehab Potential (OT) good (P)   -     Criteria for Skilled Therapeutic Interventions Met (OT) yes;meets criteria;skilled treatment is necessary (P)   -     Therapy Frequency (OT) daily (P)   -     Predicted Duration of Therapy Intervention (OT) 5 days (P)   -       Row Name 10/27/24 1400          Vital Signs    Pre Systolic BP Rehab 117 (P)   -BH     Pre Treatment Diastolic BP 77 (P)   -BH     Pre SpO2 (%) 98 (P)   -     O2 Delivery Pre Treatment room air (P)   -     O2 Delivery Intra Treatment room air (P)   -BH     O2 Delivery Post Treatment room air (P)   -BH     Pre Patient Position Supine (P)   -     Intra Patient Position Standing (P)   -     Post Patient Position Sitting (P)   -       Row Name 10/27/24 1400          Positioning and Restraints    Pre-Treatment Position in bed (P)   -     Post Treatment Position chair (P)   -     In Chair reclined;call light within reach;encouraged to call for assist;exit alarm on;with family/caregiver;waffle cushion;LLE elevated (P)   -               User Key  (r) = Recorded By, (t) = Taken By, (c) = Cosigned By      Initials Name Provider Type    Juliet Goel, OT  Student OT Student                   Outcome Measures       Row Name 10/27/24 1405          How much help from another is currently needed...    Putting on and taking off regular lower body clothing? 2 (P)   -     Bathing (including washing, rinsing, and drying) 2 (P)   -     Toileting (which includes using toilet bed pan or urinal) 3 (P)   -     Putting on and taking off regular upper body clothing 3 (P)   -     Taking care of personal grooming (such as brushing teeth) 3 (P)   -     Eating meals 4 (P)   -     AM-PAC 6 Clicks Score (OT) 17 (P)   -       Row Name 10/27/24 1339 10/27/24 0912       How much help from another person do you currently need...    Turning from your back to your side while in flat bed without using bedrails? 4  -SD 4  -LB    Moving from lying on back to sitting on the side of a flat bed without bedrails? 4  -SD 3  -LB    Moving to and from a bed to a chair (including a wheelchair)? 3  -SD 2  -LB    Standing up from a chair using your arms (e.g., wheelchair, bedside chair)? 3  -SD 3  -LB    Climbing 3-5 steps with a railing? 2  -SD 2  -LB    To walk in hospital room? 3  -SD 2  -LB    AM-PAC 6 Clicks Score (PT) 19  -SD 16  -LB    Highest Level of Mobility Goal 6 --> Walk 10 steps or more  -SD 5 --> Static standing  -LB      Row Name 10/27/24 1405 10/27/24 1339       Functional Assessment    Outcome Measure [facility] AM-PAC 6 Clicks Daily Activity (OT) (P)   - AM-PAC 6 Clicks Basic Mobility (PT)  -SD              User Key  (r) = Recorded By, (t) = Taken By, (c) = Cosigned By      Initials Name Provider Type    Nannette Yadav, PT Physical Therapist    Anupama Gr, RN Registered Nurse    Juliet Goel, OT Student OT Student                    Occupational Therapy Education       Title: PT OT SLP Therapies (Done)       Topic: Occupational Therapy (Done)       Point: ADL training (Done)       Description:   Instruct learner(s) on proper safety adaptation and  remediation techniques during self care or transfers.   Instruct in proper use of assistive devices.                  Learning Progress Summary            Patient Acceptance, E, VU by  at 10/27/2024 1406   Family Acceptance, E, VU by  at 10/27/2024 1406                      Point: Home exercise program (Done)       Description:   Instruct learner(s) on appropriate technique for monitoring, assisting and/or progressing therapeutic exercises/activities.                  Learning Progress Summary            Patient Acceptance, E, VU by  at 10/27/2024 1406   Family Acceptance, E, VU by  at 10/27/2024 1406                      Point: Precautions (Done)       Description:   Instruct learner(s) on prescribed precautions during self-care and functional transfers.                  Learning Progress Summary            Patient Acceptance, E, VU by  at 10/27/2024 1406   Family Acceptance, E, VU by  at 10/27/2024 1406                      Point: Body mechanics (Done)       Description:   Instruct learner(s) on proper positioning and spine alignment during self-care, functional mobility activities and/or exercises.                  Learning Progress Summary            Patient Acceptance, E, VU by  at 10/27/2024 1406   Family Acceptance, E, VU by  at 10/27/2024 1406                                      User Key       Initials Effective Dates Name Provider Type Discipline     08/08/24 -  Juliet Reilly OT Student OT Student OT                  OT Recommendation and Plan  Planned Therapy Interventions (OT): (P) activity tolerance training, adaptive equipment training, BADL retraining, functional balance retraining, occupation/activity based interventions, patient/caregiver education/training, ROM/therapeutic exercise, strengthening exercise, transfer/mobility retraining  Therapy Frequency (OT): (P) daily  Plan of Care Review  Plan of Care Reviewed With: (P) patient, daughter  Progress: (P) no change  Outcome  Evaluation: (P) OT eval complete. Pt presenting below functional baseline with bed mobility, transfers, and ADL tasks d/t LLE NWB, ROM, and strength. Pt was CGA x2 with FWW w/ chair follow for functional mobility this date. Pt required 1 seated rest break d/t fatigue. Continue IP OT to address current deficits. Rec IRF at d/c for best functional outcome.     Time Calculation:   Evaluation Complexity (OT)  Review Occupational Profile/Medical/Therapy History Complexity: (P) brief/low complexity  Assessment, Occupational Performance/Identification of Deficit Complexity: (P) 1-3 performance deficits  Clinical Decision Making Complexity (OT): (P) problem focused assessment/low complexity  Overall Complexity of Evaluation (OT): (P) low complexity     Time Calculation- OT       Row Name 10/27/24 1406 10/27/24 1340          Time Calculation- OT    OT Start Time 1308 (P)   - --     OT Received On 10/27/24 (P)   - --     OT Goal Re-Cert Due Date 11/06/24 (P)   - --        Timed Charges    37731 - Gait Training Minutes  -- 16  -SD        Untimed Charges    OT Eval/Re-eval Minutes 48 (P)   - --        Total Minutes    Timed Charges Total Minutes -- 16  -SD     Untimed Charges Total Minutes 48 (P)   - --      Total Minutes 48 (P)   - 16  -SD               User Key  (r) = Recorded By, (t) = Taken By, (c) = Cosigned By      Initials Name Provider Type    Nannette Yadav, PT Physical Therapist     Juliet Reilly, OT Student OT Student                  Therapy Charges for Today       Code Description Service Date Service Provider Modifiers Qty    [id number]  OT EVAL LOW COMPLEXITY 4 10/27/2024 Juliet Reilly OT Student GO 1                 Juliet Reilly OT Student  10/27/2024

## 2024-10-27 NOTE — PLAN OF CARE
Goal Outcome Evaluation:  Plan of Care Reviewed With: (P) patient, daughter        Progress: (P) no change  Outcome Evaluation: (P) OT eval complete. Pt presenting below functional baseline with bed mobility, transfers, and ADL tasks d/t LLE NWB, ROM, and strength. Pt was CGA x2 with FWW w/ chair follow for functional mobility this date. Pt required 1 seated rest break d/t fatigue. Continue IP OT to address current deficits. Rec IRF at d/c for best functional outcome.

## 2024-10-27 NOTE — THERAPY TREATMENT NOTE
Patient Name: Nallely Mccabe  : 1950    MRN: [id number]                              Today's Date: 10/27/2024       Admit Date: 10/25/2024    Visit Dx: No diagnosis found.  Patient Active Problem List   Diagnosis    CAD (coronary artery disease)    Essential hypertension    Dyslipidemia    GERD (gastroesophageal reflux disease)    Anxiety    Depression    Obesity (BMI 30-39.9)    Metabolic encephalopathy    Acute hyponatremia    Fall    Rib fractures    Bacteriuria    Ankle fracture, bimalleolar, closed, left, initial encounter    CKD (chronic kidney disease) stage 3, GFR 30-59 ml/min    S/P ORIF (open reduction internal fixation) fracture, left ankle bimalleolar     Past Medical History:   Diagnosis Date    Anxiety     CAD (coronary artery disease)     CKD (chronic kidney disease) stage 3, GFR 30-59 ml/min     Depression     GERD (gastroesophageal reflux disease)     Hyperlipidemia     Hypertension     Myocardial infarction 10/18/2011    Obesity (BMI 30.0-34.9)     Pyelonephritis     Pyelonephritis, acute 2018    Right sided    Sepsis     Sepsis 2018    UTI (urinary tract infection)      Past Surgical History:   Procedure Laterality Date    CARDIAC CATHETERIZATION  10/18/2011    COLONOSCOPY      CORONARY ANGIOPLASTY  10/18/2011    EYE SURGERY      ORIF HUMERUS FRACTURE      POLYPECTOMY      SINUS SURGERY        General Information       Row Name 10/27/24 1308          Physical Therapy Time and Intention    Document Type therapy note (daily note)  -SD     Mode of Treatment physical therapy  -SD       Row Name 10/27/24 1308          General Information    Existing Precautions/Restrictions left;non-weight bearing;fall  -SD       Row Name 10/27/24 1308          Cognition    Orientation Status (Cognition) oriented x 4  -SD       Row Name 10/27/24 1308          Safety Issues/Impairments Affecting Functional Mobility    Safety Issues Affecting Function (Mobility) awareness of need for assistance;insight  into deficits/self-awareness;positioning of assistive device;sequencing abilities;safety precautions follow-through/compliance;safety precaution awareness  -SD     Impairments Affecting Function (Mobility) balance;endurance/activity tolerance;strength;range of motion (ROM);pain  -SD               User Key  (r) = Recorded By, (t) = Taken By, (c) = Cosigned By      Initials Name Provider Type    Nannette Yadav, PT Physical Therapist                   Mobility       Row Name 10/27/24 0272          Bed Mobility    Bed Mobility supine-sit  -SD     Supine-Sit [place] (Bed Mobility) standby assist;verbal cues  -SD     Assistive Device (Bed Mobility) head of bed elevated;bed rails  -SD     Comment, (Bed Mobility) increased time and effort needed to perform  -SD       Row Name 10/27/24 1332          Transfers    Comment, (Transfers) cues for hand placement and sequencing, STS performed from both EOB and recliner.  -SD       Row Name 10/27/24 1332          Sit-Stand Transfer    Sit-Stand Lanier (Transfers) contact guard;verbal cues;2 person assist  -SD     Assistive Device (Sit-Stand Transfers) walker, front-wheeled  -SD       Row Name 10/27/24 1337          Gait/Stairs (Locomotion)    [place] Level (Gait) contact guard;2 person assist;verbal cues;1 person assist  -SD     Assistive Device (Gait) walker, front-wheeled  -SD     Distance in Feet (Gait) 15  -SD     Deviations/Abnormal Patterns (Gait) festinating/shuffling;gait speed decreased;stride length decreased  -SD     Comment, (Gait/Stairs) Pt amb 2 x 15ft with RW and CGA x2 + recliner in tow. Pt amb with hop-to gait pattern and able to maintain NWB throughout all mobility. Pt required 1 seated rest break due to fatigue; overall distance limited by UE weakness and fatigue.  -SD       Row Name 10/27/24 1332          Mobility    Extremity Weight-bearing Status left lower extremity  -SD     Left Lower Extremity (Weight-bearing Status) non  weight-bearing (NWB)   -SD               User Key  (r) = Recorded By, (t) = Taken By, (c) = Cosigned By      Initials Name Provider Type    Nannette Yadav PT Physical Therapist                   Obj/Interventions       Row Name 10/27/24 [facility]6          Hip (Therapeutic Exercise)    Hip AROM (Therapeutic Exercise) left;flexion;sitting;10 repetitions  -SD       Row Name 10/27/24 1336          Knee (Therapeutic Exercise)    Knee AROM (Therapeutic Exercise) left;LAQ (long arc quad);10 repetitions;sitting  -SD       Row Name 10/27/24 1336          Balance    Balance Assessment sitting static balance;standing static balance  -SD     Static Sitting Balance independent  -SD     Position, Sitting Balance unsupported;sitting edge of bed  -SD     Static Standing Balance contact guard  -SD     Position/Device Used, Standing Balance supported;walker, front-wheeled  -SD               User Key  (r) = Recorded By, (t) = Taken By, (c) = Cosigned By      Initials Name Provider Type    Nannette Yadav PT Physical Therapist                   Goals/Plan    No documentation.                  Clinical Impression       Row Name 10/27/24 [facility]6          Pain    Pretreatment Pain Rating 0/10 - no pain  -SD     Posttreatment Pain Rating 3/10  -SD     Pain Side/Orientation generalized  -SD     Pain Management Interventions activity modification encouraged  -SD     Response to Pain Interventions activity participation with tolerable pain  -SD       Row Name 10/27/24 [facility]5          Plan of Care Review    Plan of Care Reviewed With patient;child  -SD     Progress improving  -SD     Outcome Evaluation Pt amb 2 x 15ft with RW and CGA x2 + recliner in tow. Pt amb with hop-to gait pattern and able to maintain NWB throughout all mobility. Pt required 1 seated rest break due to fatigue; overall distance limited by UE weakness and fatigue. Pt remains below baseline level of function for mobility and PT continues to recommend d/c rehab.   -SD       Row Name 10/27/24 1336          Vital Signs    Pre Systolic BP Rehab 117  -SD     Pre Treatment Diastolic BP 77  -SD     Post SpO2 (%) 99  -SD     O2 Delivery Post Treatment room air  -SD     Pre Patient Position Supine  -SD     Post Patient Position Sitting  -SD       Row Name 10/27/24 1336          Positioning and Restraints    Pre-Treatment Position in bed  -SD     Post Treatment Position chair  -SD     In Chair sitting;call light within reach;encouraged to call for assist;with OT;waffle cushion;exit alarm on  -SD               User Key  (r) = Recorded By, (t) = Taken By, (c) = Cosigned By      Initials Name Provider Type    Nannette Yadav, JAIME Physical Therapist                   Outcome Measures       Row Name 10/27/24 1339 10/27/24 0912       How much help from another person do you currently need...    Turning from your back to your side while in flat bed without using bedrails? 4  -SD 4  -LB    Moving from lying on back to sitting on the side of a flat bed without bedrails? 4  -SD 3  -LB    Moving to and from a bed to a chair (including a wheelchair)? 3  -SD 2  -LB    Standing up from a chair using your arms (e.g., wheelchair, bedside chair)? 3  -SD 3  -LB    Climbing 3-5 steps with a railing? 2  -SD 2  -LB    To walk in hospital room? 3  -SD 2  -LB    AM-PAC 6 Clicks Score (PT) 19  -SD 16  -LB    Highest Level of Mobility Goal 6 --> Walk 10 steps or more  -SD 5 --> Static standing  -LB      Row Name 10/27/24 1339          Functional Assessment    Outcome Measure Options AM-PAC 6 Clicks Basic Mobility (PT)  -SD               User Key  (r) = Recorded By, (t) = Taken By, (c) = Cosigned By      Initials Name Provider Type    Nannette Yadav, JAIME Physical Therapist    Anupama Gr, RN Registered Nurse                                 Physical Therapy Education       Title: PT OT SLP Therapies (In Progress)       Topic: Physical Therapy (Done)       Point: Mobility training (Done)        Learning Progress Summary            Patient Acceptance, E,D, VU,NR by SD at 10/27/2024 1340    Eager, E,D, VU,DU,NR by  at 10/26/2024 1211    Comment: Reviewed LLE NWB status, HEP, PT POC, and DC recommendations.   Other Eager, E,D, VU,DU,NR by  at 10/26/2024 1211    Comment: Reviewed LLE NWB status, HEP, PT POC, and DC recommendations.                      Point: Home exercise program (Done)       Learning Progress Summary            Patient Acceptance, E,D, VU,NR by SD at 10/27/2024 1340    Eager, E,D, VU,DU,NR by  at 10/26/2024 1211    Comment: Reviewed LLE NWB status, HEP, PT POC, and DC recommendations.   Other Eager, E,D, VU,DU,NR by  at 10/26/2024 1211    Comment: Reviewed LLE NWB status, HEP, PT POC, and DC recommendations.                      Point: Body mechanics (Done)       Learning Progress Summary            Patient Acceptance, E,D, VU,NR by SD at 10/27/2024 1340    Eager, E,D, VU,DU,NR by  at 10/26/2024 1211    Comment: Reviewed LLE NWB status, HEP, PT POC, and DC recommendations.   Other Eager, E,D, VU,DU,NR by  at 10/26/2024 1211    Comment: Reviewed LLE NWB status, HEP, PT POC, and DC recommendations.                      Point: Precautions (Done)       Learning Progress Summary            Patient Acceptance, E,D, VU,NR by SD at 10/27/2024 1340    Eager, E,D, VU,DU,NR by  at 10/26/2024 1211    Comment: Reviewed LLE NWB status, HEP, PT POC, and DC recommendations.   Other Eager, E,D, VU,DU,NR by  at 10/26/2024 1211    Comment: Reviewed LLE NWB status, HEP, PT POC, and DC recommendations.                                      User Key       Initials Effective Dates Name Provider Type Discipline    SD 03/13/23 -  Nannette Crook, PT Physical Therapist PT     09/21/21 -  Bill Levy, PT Physical Therapist PT                  PT Recommendation and Plan     Progress: improving  Outcome Evaluation: Pt amb 2 x 15ft with RW and CGA x2 + recliner in tow. Pt amb with hop-to  gait pattern and able to maintain NWB throughout all mobility. Pt required 1 seated rest break due to fatigue; overall distance limited by UE weakness and fatigue. Pt remains below baseline level of function for mobility and PT continues to recommend d/c rehab.     Time Calculation:         PT Charges       Row Name 10/27/24 1340             Time Calculation    Start Time 1308  -SD      PT Received On 10/27/24  -SD      PT Goal Re-Cert Due Date 11/05/24  -SD         Time Calculation- PT    Total Timed Code Minutes- PT 16 minute(s)  -SD         Timed Charges    70500 - Gait Training Minutes  16  -SD         Total Minutes    Timed Charges Total Minutes 16  -SD       Total Minutes 16  -SD                User Key  (r) = Recorded By, (t) = Taken By, (c) = Cosigned By      Initials Name Provider Type    Nannette Yadav PT Physical Therapist                  Therapy Charges for Today       Code Description Service Date Service Provider Modifiers Qty    85164183403 HC GAIT TRAINING EA 15 MIN 10/27/2024 Nannette Crook, JAIME GP 1            PT G-Codes  Outcome Measure Options: AM-PAC 6 Clicks Basic Mobility (PT)  AM-PAC 6 Clicks Score (PT): 19       Nannette Crook PT  10/27/2024

## 2024-10-27 NOTE — PLAN OF CARE
Goal Outcome Evaluation:  Plan of Care Reviewed With: patient           Outcome Evaluation: A&Ox4. left foot and leg splinted with ace wrap in place. patient is NWB on left leg. is a one to two assist up to bedside chair and to bedside commode. Has nerve block in place and is intact. sats well on room air. c/o little ache and tingle in left ankle this morning with relief when pain medication po was given. plan is home vs rehab.

## 2024-10-27 NOTE — CASE MANAGEMENT/SOCIAL WORK
Discharge Planning Assessment  [facility]     Patient Name: Nallely Mccabe  MRN: [id number]  Today's Date: 10/27/2024    Admit Date: 10/25/2024    Plan: Rehab   Discharge Needs Assessment    No documentation.                  Discharge Plan       Row Name 10/27/24 1540       Plan    Plan Rehab    Patient/Family in Agreement with Plan yes    Plan Comments I talked with patient's dtr [place] over the phone. Patient lives in [place] alone. Independent before this fall and patient has a RW, BSC, and shower bench. Therapy recommends IRF at discharge. Dtr and patient agrees with  referral and I told the [place] that patient may go to subacute rehab and not acute.    Final Discharge Disposition Code 03 - skilled nursing facility (SNF)                  Continued Care and Services - Admitted Since 10/25/2024       Destination       Service Provider Request Status Services Address Phone Fax Patient Preferred    [facility] SUBACUTE Pending - No Request Sent -- [address] [id number] [phone] --                     Demographic Summary    No documentation.                  Functional Status    No documentation.                  Psychosocial    No documentation.                  Abuse/Neglect    No documentation.                  Legal    No documentation.                  Substance Abuse    No documentation.                  Patient Forms    No documentation.                     Laura Kat, RN

## 2024-10-27 NOTE — PROGRESS NOTES
"IM progress note      Nallely Mccabe  [id number]  1950     LOS: 0 days     Attending: Alex Martinez MD    Primary Care Provider: Shay Rivera MD      Chief Complaint/Reason for visit:  No chief complaint on file.      Subjective   Good pain control.  No nausea, vomiting, or shortness of breath.  Awaiting PT and OT sessions today.  Passing flatus but no BM yet.    Objective        Visit Vitals  /77 (BP Location: Right arm, Patient Position: Lying)   Pulse 70   Temp 97.8 °F (36.6 °C) (Oral)   Resp 18   Ht 162.6 cm (64.02\")   Wt 77.6 kg (171 lb 1.2 oz)   SpO2 94%   BMI 29.35 kg/m²     Temp (24hrs), Av °F (36.7 °C), Min:97.8 °F (36.6 °C), Max:98.2 °F (36.8 °C)      Intake/Output:    Intake/Output Summary (Last 24 hours) at 10/27/2024 1317  Last data filed at 10/27/2024 1242  Gross per 24 hour   Intake 360 ml   Output 2900 ml   Net -2540 ml        Physical Therapy:    10/26/2024    Physical Exam:     General Appearance:    Alert, cooperative, in no acute distress   Head:    Normocephalic, without obvious abnormality, atraumatic    Lungs:     Normal effort, symmetric chest rise,  clear to      auscultation bilaterally              Heart:    Regular rhythm and normal rate, normal S1 and S2    Abdomen:     Normal bowel sounds, no masses, no organomegaly, soft        nontender, nondistended, no guarding, no rebound                tenderness   Extremities: Clean dry and intact splint/dressing left lower extremity.  Peripheral nerve block catheter present.  Able to wiggle toes.      Pulses:   Pulses palpable and equal bilaterally   Skin:   No bleeding, bruising or rash          Results Review:     I reviewed the patient's new clinical results.   Results from last 7 days   Lab Units 10/24/24  1352   WBC 10*3/mm3 8.34   HEMOGLOBIN g/dL 11.5*   HEMATOCRIT % 35.5   PLATELETS 10*3/mm3 357     Results from last 7 days   Lab Units 10/24/24  1352   SODIUM mmol/L 138   POTASSIUM mmol/L 4.5   CHLORIDE mmol/L " 102   CO2 mmol/L 23.0   BUN mg/dL 15   CREATININE mg/dL 1.24*   CALCIUM mg/dL 8.7   BILIRUBIN mg/dL 0.5   ALK PHOS U/L 86   ALT (SGPT) U/L 8   AST (SGOT) U/L 16   GLUCOSE mg/dL 121*     I reviewed the patient's new imaging including images and reports.        All medications reviewed.   aspirin, 81 mg, Oral, Daily  buPROPion XL, 300 mg, Oral, Daily  carboxymethylcellulose, 2 drop, Both Eyes, TID  carvedilol, 12.5 mg, Oral, BID With Meals  cholecalciferol, 2,000 Units, Oral, Daily  clopidogrel, 75 mg, Oral, Daily  fluticasone, 1 spray, Nasal, Daily  losartan, 100 mg, Oral, Nightly  meloxicam, 7.5 mg, Oral, Daily  pantoprazole, 40 mg, Oral, Q AM  pravastatin, 20 mg, Oral, Daily  sertraline, 75 mg, Oral, Daily      acetaminophen, 650 mg, Q4H PRN   Or  acetaminophen, 650 mg, Q4H PRN  ALPRAZolam, 1 mg, TID PRN  benzonatate, 100 mg, Q8H PRN  HYDROmorphone, 0.5 mg, Q2H PRN   And  naloxone, 0.4 mg, Q5 Min PRN  labetalol, 10 mg, Q4H PRN  ondansetron, 4 mg, Q6H PRN  ondansetron ODT, 4 mg, Q6H PRN  oxyCODONE-acetaminophen, 1 tablet, Q4H PRN  oxyCODONE-acetaminophen, 1 tablet, Q4H PRN  sodium chloride, 500 mL, TID PRN        Assessment & Plan       S/P ORIF (open reduction internal fixation) fracture, left ankle bimalleolar    CAD (coronary artery disease)    Essential hypertension    Dyslipidemia    GERD (gastroesophageal reflux disease)    Anxiety    Depression    Ankle fracture, bimalleolar, closed, left, initial encounter    CKD (chronic kidney disease) stage 3, GFR 30-59 ml/min         Plan     1. PT/OT.  Continue  2. Pain control-prns, multimodal approach.      3. IS-encourage  4. DVT proph- mechanicals and aspirin.  5. Bowel regimen  6. Resume home medications as appropriate  7. Monitor post-op labs  8. DC planning, likely to inpatient rehab,  to follow     - Hypertension:  Resume home medications as appropriate, formulary substitution when indicated.  Holding parameters.  PRN medications for elevated blood  pressure.      -GERD:  Resume PPI.  Formulary substitution when indicated.     -Dyslipidemia:  Resumed home regimen statin ( formulary substitution when appropriate).     -CKD: Monitor renal function closely.  Avoid nephrotoxic agents.     -Depression and anxiety: Maintained on home regimen including sertraline and Wellbutrin XL.    Anne Zuniga MD  10/27/24  13:17 EDT

## 2024-10-27 NOTE — PLAN OF CARE
Goal Outcome Evaluation:  Plan of Care Reviewed With: patient        Progress: improving     AOx4, VSS, RA, LLE elevated, acewrap in place, PO prn medication for pain, no changes this shift       Problem: Adult Inpatient Plan of Care  Goal: Absence of Hospital-Acquired Illness or Injury  Intervention: Identify and Manage Fall Risk  Description: Perform standard risk assessment on admission using a validated tool or comprehensive approach appropriate to the patient; reassess fall risk frequently, with change in status or transfer to another level of care.  Communicate risk to interprofessional healthcare team; ensure fall risk visible cue.  Determine need for increased observation, equipment and environmental modification, as well as use of supportive, nonskid footwear.  Adjust safety measures to individual needs and identified risk factors.  Reinforce the importance of active participation with fall risk prevention, safety, and physical activity with the patient and family.  Perform regular intentional rounding to assess need for position change, pain assessment and personal needs, including assistance with toileting.  Recent Flowsheet Documentation  Taken 10/27/2024 1652 by Anupama Cox RN  Safety Promotion/Fall Prevention:   safety round/check completed   room organization consistent   nonskid shoes/slippers when out of bed   mobility aid in reach   lighting adjusted   fall prevention program maintained   clutter free environment maintained   assistive device/personal items within reach  Taken 10/27/2024 1449 by Anupama Cox, RN  Safety Promotion/Fall Prevention:   safety round/check completed   room organization consistent   nonskid shoes/slippers when out of bed   mobility aid in reach   lighting adjusted   gait belt   fall prevention program maintained   clutter free environment maintained   assistive device/personal items within reach   activity supervised  Taken 10/27/2024 1220 by Anupama Cox  RN  Safety Promotion/Fall Prevention:   safety round/check completed   toileting scheduled   room organization consistent   nonskid shoes/slippers when out of bed   lighting adjusted   elopement precautions   fall prevention program maintained   clutter free environment maintained   assistive device/personal items within reach   activity supervised   mobility aid in reach   gait belt  Taken 10/27/2024 1011 by Anupama Cox RN  Safety Promotion/Fall Prevention:   safety round/check completed   room organization consistent   nonskid shoes/slippers when out of bed   mobility aid in reach   lighting adjusted   gait belt   fall prevention program maintained   elopement precautions   clutter free environment maintained   assistive device/personal items within reach   activity supervised  Taken 10/27/2024 0912 by Anupama Cox, RN  Safety Promotion/Fall Prevention:   safety round/check completed   toileting scheduled   room organization consistent   nonskid shoes/slippers when out of bed   mobility aid in reach   gait belt   lighting adjusted   fall prevention program maintained   elopement precautions   clutter free environment maintained   assistive device/personal items within reach   activity supervised

## 2024-10-27 NOTE — PLAN OF CARE
Goal Outcome Evaluation:  Plan of Care Reviewed With: patient, child        Progress: improving  Outcome Evaluation: Pt amb 2 x 15ft with RW and CGA x2 + recliner in tow. Pt amb with hop-to gait pattern and able to maintain NWB throughout all mobility. Pt required 1 seated rest break due to fatigue; overall distance limited by UE weakness and fatigue. Pt remains below baseline level of function for mobility and PT continues to recommend d/c rehab.

## 2024-10-28 LAB
ANION GAP SERPL CALCULATED.3IONS-SCNC: 6 MMOL/L (ref 5–15)
BUN SERPL-MCNC: 16 MG/DL (ref 8–23)
BUN/CREAT SERPL: 16 (ref 7–25)
CALCIUM SPEC-SCNC: 8.3 MG/DL (ref 8.6–10.5)
CHLORIDE SERPL-SCNC: 107 MMOL/L (ref 98–107)
CO2 SERPL-SCNC: 28 MMOL/L (ref 22–29)
CREAT SERPL-MCNC: 1 MG/DL (ref 0.57–1)
DEPRECATED RDW RBC AUTO: 63.4 FL (ref 37–54)
EGFRCR SERPLBLD CKD-EPI 2021: 58.5 ML/MIN/1.73
ERYTHROCYTE [DISTWIDTH] IN BLOOD BY AUTOMATED COUNT: 17 % (ref 12.3–15.4)
GLUCOSE SERPL-MCNC: 95 MG/DL (ref 65–99)
HCT VFR BLD AUTO: 31.3 % (ref 34–46.6)
HGB BLD-MCNC: 10.1 G/DL (ref 12–15.9)
MCH RBC QN AUTO: 32.3 PG (ref 26.6–33)
MCHC RBC AUTO-ENTMCNC: 32.3 G/DL (ref 31.5–35.7)
MCV RBC AUTO: 100 FL (ref 79–97)
PLATELET # BLD AUTO: 291 10*3/MM3 (ref 140–450)
PMV BLD AUTO: 9.5 FL (ref 6–12)
POTASSIUM SERPL-SCNC: 3.7 MMOL/L (ref 3.5–5.2)
RBC # BLD AUTO: 3.13 10*6/MM3 (ref 3.77–5.28)
SODIUM SERPL-SCNC: 141 MMOL/L (ref 136–145)
WBC NRBC COR # BLD AUTO: 6.48 10*3/MM3 (ref 3.4–10.8)

## 2024-10-28 PROCEDURE — 63710000001 CLOPIDOGREL 75 MG TABLET: Performed by: ORTHOPAEDIC SURGERY

## 2024-10-28 PROCEDURE — 63710000001 ASPIRIN 81 MG TABLET DELAYED-RELEASE: Performed by: ORTHOPAEDIC SURGERY

## 2024-10-28 PROCEDURE — 97530 THERAPEUTIC ACTIVITIES: CPT

## 2024-10-28 PROCEDURE — A9270 NON-COVERED ITEM OR SERVICE: HCPCS | Performed by: ORTHOPAEDIC SURGERY

## 2024-10-28 PROCEDURE — 63710000001 CARVEDILOL 12.5 MG TABLET: Performed by: ORTHOPAEDIC SURGERY

## 2024-10-28 PROCEDURE — 85027 COMPLETE CBC AUTOMATED: CPT | Performed by: INTERNAL MEDICINE

## 2024-10-28 PROCEDURE — 63710000001 LOSARTAN 50 MG TABLET: Performed by: ORTHOPAEDIC SURGERY

## 2024-10-28 PROCEDURE — 97116 GAIT TRAINING THERAPY: CPT

## 2024-10-28 PROCEDURE — 63710000001 SERTRALINE 50 MG TABLET: Performed by: ORTHOPAEDIC SURGERY

## 2024-10-28 PROCEDURE — 63710000001 BUPROPION XL 150 MG TABLET SUSTAINED-RELEASE 24 HOUR: Performed by: ORTHOPAEDIC SURGERY

## 2024-10-28 PROCEDURE — 97110 THERAPEUTIC EXERCISES: CPT

## 2024-10-28 PROCEDURE — 63710000001 OXYCODONE-ACETAMINOPHEN 5-325 MG TABLET: Performed by: ORTHOPAEDIC SURGERY

## 2024-10-28 PROCEDURE — 63710000001 PANTOPRAZOLE 40 MG TABLET DELAYED-RELEASE: Performed by: ORTHOPAEDIC SURGERY

## 2024-10-28 PROCEDURE — 63710000001 PRAVASTATIN 20 MG TABLET: Performed by: ORTHOPAEDIC SURGERY

## 2024-10-28 PROCEDURE — 63710000001 MELOXICAM 15 MG TABLET: Performed by: ORTHOPAEDIC SURGERY

## 2024-10-28 PROCEDURE — 80048 BASIC METABOLIC PNL TOTAL CA: CPT | Performed by: INTERNAL MEDICINE

## 2024-10-28 PROCEDURE — 63710000001 CHOLECALCIFEROL 25 MCG (1000 UT) TABLET: Performed by: ORTHOPAEDIC SURGERY

## 2024-10-28 PROCEDURE — 63710000001 ALPRAZOLAM 1 MG TABLET: Performed by: ORTHOPAEDIC SURGERY

## 2024-10-28 RX ADMIN — MELOXICAM 7.5 MG: 15 TABLET ORAL at 11:18

## 2024-10-28 RX ADMIN — ASPIRIN 81 MG: 81 TABLET, COATED ORAL at 08:26

## 2024-10-28 RX ADMIN — CARBOXYMETHYLCELLULOSE SODIUM 2 DROP: 5 SOLUTION/ DROPS OPHTHALMIC at 21:53

## 2024-10-28 RX ADMIN — SERTRALINE 75 MG: 50 TABLET, FILM COATED ORAL at 08:24

## 2024-10-28 RX ADMIN — LOSARTAN POTASSIUM 100 MG: 50 TABLET, FILM COATED ORAL at 20:52

## 2024-10-28 RX ADMIN — BUPROPION HYDROCHLORIDE 300 MG: 150 TABLET, EXTENDED RELEASE ORAL at 08:25

## 2024-10-28 RX ADMIN — ALPRAZOLAM 1 MG: 1 TABLET ORAL at 14:02

## 2024-10-28 RX ADMIN — FLUTICASONE PROPIONATE 1 SPRAY: 50 SPRAY, METERED NASAL at 08:24

## 2024-10-28 RX ADMIN — OXYCODONE HYDROCHLORIDE AND ACETAMINOPHEN 1 TABLET: 5; 325 TABLET ORAL at 14:02

## 2024-10-28 RX ADMIN — ALPRAZOLAM 1 MG: 1 TABLET ORAL at 20:55

## 2024-10-28 RX ADMIN — Medication 2000 UNITS: at 08:25

## 2024-10-28 RX ADMIN — PANTOPRAZOLE SODIUM 40 MG: 40 TABLET, DELAYED RELEASE ORAL at 05:50

## 2024-10-28 RX ADMIN — CARVEDILOL 12.5 MG: 12.5 TABLET, FILM COATED ORAL at 08:25

## 2024-10-28 RX ADMIN — OXYCODONE HYDROCHLORIDE AND ACETAMINOPHEN 1 TABLET: 5; 325 TABLET ORAL at 05:49

## 2024-10-28 RX ADMIN — CLOPIDOGREL BISULFATE 75 MG: 75 TABLET ORAL at 08:26

## 2024-10-28 RX ADMIN — PRAVASTATIN SODIUM 20 MG: 20 TABLET ORAL at 20:52

## 2024-10-28 RX ADMIN — OXYCODONE HYDROCHLORIDE AND ACETAMINOPHEN 1 TABLET: 5; 325 TABLET ORAL at 20:53

## 2024-10-28 RX ADMIN — CARVEDILOL 12.5 MG: 12.5 TABLET, FILM COATED ORAL at 17:09

## 2024-10-28 NOTE — CASE MANAGEMENT/SOCIAL WORK
Continued Stay Note  [facility]     Patient Name: Nallely Mccabe  MRN: [id number]  Today's Date: 10/28/2024    Admit Date: 10/25/2024    Plan: [facility] subacute   Discharge Plan       Row Name 10/28/24 1326       Plan    Plan [facility] subacute    Plan Comments Patient has insurance approval for skilled rehab at [facility] and can discharge tomorrow.  [facility] van will transport via  at 1130; patient to be at [address] by 1120.  Louann at [facility] updated.      Final Discharge Disposition Code 03 - skilled nursing facility (SNF)                   Discharge Codes    No documentation.                 Expected Discharge Date and Time       Expected Discharge Date Expected Discharge Time    Oct 29, 2024               Yulissa Cotto RN

## 2024-10-28 NOTE — DISCHARGE PLACEMENT REQUEST
"Nallely Fontana (74 y.o. Female)       Date of Birth   1950    Social Security Number       Address   [address] [place] [id number]    Home Phone   [phone]    MRN   [id number]       [facility]    Marital Status                               Admission Date   10/25/24    Admission Type   Elective    Admitting Provider   Alex Martinez MD    Attending Provider   Alex Martinez MD    Department, Room/Bed   [facility] 3H, S369/1       Discharge Date       Discharge Disposition       Discharge Destination                                 Attending Provider: Alex Martinez MD    Allergies: No Known Allergies    Isolation: None   Infection: None   Code Status: Prior    Ht: 162.6 cm (64.02\")   Wt: 77.6 kg (171 lb 1.2 oz)    Admission Cmt: None   Principal Problem: S/P ORIF (open reduction internal fixation) fracture, left ankle bimalleolar [Z98.890,Z87.81]                   Active Insurance as of 10/25/2024       Primary Coverage       Payor Plan Insurance Group Employer/Plan Group    HU[employer]NA MEDICARE REPLACEMENT [employer] MED ADV GROUP [id number]       Payor Plan Address Payor Plan Phone Number Payor Plan Fax Number Effective Dates    [address] [address] [phone]  2023 - None Entered    [facility] [id number]         Subscriber Name Subscriber Birth Date Member ID       NALLELY FONTANA 1950 [id number]                     Emergency Contacts        (Rel.) Home Phone Work Phone Mobile Phone    Luciano Valadez (Other) [phone] -- [phone]    Nessa Fontana (Daughter) [phone] -- [phone]    ÓscarKathleen (Sister) [phone] -- [phone]                 History & Physical        Anne Zuniga MD at 10/25/24 2123          Patient Name: Nallely Fontana  MRN: [id number]  : 1950  DOS: 10/25/2024    Attending: Alex Martinez MD    Primary Care Provider: Shay Rivera MD      Chief complaint: Left ankle " fracture    Subjective  Patient is a pleasant 74 y.o. female presented for scheduled surgery by Dr. Martinez.     She had a fall at home on 10/19/24 and injured her left ankle. She went to ER on 4/20/24 and was found to have a mildly displaced distal fibular and medial malleolar fractures. She was placed in a splint and has been partial weight-bearing with a walker.     I saw her preoperatively, discussed with patient and her daughter patient's past medical history and home medication regimen.  She lives alone.  Her daughter plans to help her at home however the daughter herself has a back injury and has limited ability to lift.    Subsequent notes indicate she later underwent left ankle open reduction internal fixation of bimalleolar fracture with application of short leg splint.  Surgery was done under general anesthesia and a block, was tolerated well, she was admitted for further management.    Allergies:  No Known Allergies    Meds:  Medications Prior to Admission   Medication Sig Dispense Refill Last Dose/Taking    acetaminophen (TYLENOL) 500 MG tablet Take 1 tablet by mouth As Needed for Mild Pain.   10/24/2024    ALPRAZolam (XANAX) 1 MG tablet Take 1 tablet by mouth 3 (Three) Times a Day As Needed for Anxiety.  0 Past Week    aspirin 81 MG EC tablet Take 1 tablet by mouth Daily.   Past Month    benzonatate (TESSALON) 100 MG capsule Take 1 capsule by mouth As Needed for Cough.   Past Month    buPROPion XL (WELLBUTRIN XL) 300 MG 24 hr tablet Take 1 tablet by mouth Daily.  0 Past Week    carboxymethylcellulose (REFRESH PLUS) 0.5 % solution Administer 2 drops to both eyes 3 (Three) Times a Day As Needed for Dry Eyes.   10/25/2024    carvedilol (COREG) 12.5 MG tablet Take 1 tablet by mouth 2 (Two) Times a Day With Meals.   10/25/2024    Cholecalciferol (Vitamin D3) 50 MCG (2000 UT) capsule Take 1 capsule by mouth every night at bedtime. 42 capsule 0 Past Week    fluticasone (FLONASE) 50 MCG/ACT nasal spray  Administer 1 spray into the nostril(s) as directed by provider Daily.  0 10/24/2024    HYDROcodone-acetaminophen (NORCO) 5-325 MG per tablet Take 1 tablet by mouth Every 6 (Six) Hours As Needed for Severe Pain. 12 tablet 0 10/25/2024 at  7:00 AM    ibuprofen (ADVIL,MOTRIN) 600 MG tablet Take 1 tablet by mouth Every 6 (Six) Hours As Needed for Mild Pain. 20 tablet 0 10/25/2024 at  7:00 AM    lansoprazole (PREVACID) 30 MG capsule Take 1 capsule by mouth 2 (Two) Times a Day.   Past Week    losartan (COZAAR) 50 MG tablet Take 2 tablets by mouth Every Night.   10/24/2024    MEGARED OMEGA-3 KRILL  MG capsule Take 1,000 mg by mouth Daily.   Past Week    methenamine (HIPREX) 1 g tablet Take 1 tablet by mouth Every 12 (Twelve) Hours.   10/24/2024    Multiple Vitamins-Minerals (PRESERVISION AREDS 2 PO) Take 1 tablet by mouth 2 (Two) Times a Day.   Past Week    ondansetron ODT (ZOFRAN-ODT) 4 MG disintegrating tablet Place 1 tablet on the tongue 4 (Four) Times a Day As Needed for Nausea or Vomiting.   10/24/2024    Polyethylene Glycol 3350 (MIRALAX PO) As Needed.   Past Month    pravastatin (PRAVACHOL) 20 MG tablet Take 1 tablet by mouth every night at bedtime. 90 tablet 3 10/24/2024    sertraline (ZOLOFT) 50 MG tablet Take 1.5 tablets by mouth Daily.   Past Week    clopidogrel (PLAVIX) 75 MG tablet Take 1 tablet by mouth Daily. (Patient taking differently: Take 1 tablet by mouth Daily. Office stated pt can continue; however, pt stopped 10/19/24) 90 tablet 3 10/19/2024    diclofenac (VOLTAREN) 1 % gel gel Apply 4 g topically As Needed.   More than a month    dicyclomine (BENTYL) 10 MG capsule Take 1 capsule by mouth As Needed for Abdominal Cramping.  0 More than a month    naloxone (NARCAN) 4 MG/0.1ML nasal spray Call 911. Don't prime. [place] in 1 nostril for overdose. Repeat in 2-3 minutes in other nostril if no or minimal breathing/responsiveness. (Patient taking differently: Administer 1 spray into the nostril(s) as  "directed by provider As Needed for Opioid Reversal. Call 911. Don't prime. [place] in 1 nostril for overdose. Repeat in 2-3 minutes in other nostril if no or minimal breathing/responsiveness.) 2 each 0 Unknown         History:   Past Medical History:   Diagnosis Date    Anxiety     CAD (coronary artery disease)     CKD (chronic kidney disease) stage 3, GFR 30-59 ml/min     Depression     GERD (gastroesophageal reflux disease)     Hyperlipidemia     Hypertension     Myocardial infarction 10/18/2011    Obesity (BMI 30.0-34.9)     Pyelonephritis     Pyelonephritis, acute 05/24/2018    Right sided    Sepsis     Sepsis 05/23/2018    UTI (urinary tract infection)      Past Surgical History:   Procedure Laterality Date    CARDIAC CATHETERIZATION  10/18/2011    COLONOSCOPY      CORONARY ANGIOPLASTY  10/18/2011    EYE SURGERY      ORIF HUMERUS FRACTURE      POLYPECTOMY      SINUS SURGERY       Family History   Problem Relation Age of Onset    Diabetes Mother     Kidney failure Mother     Anemia Mother     Arrhythmia Mother     Asthma Mother     Heart failure Mother     Hypertension Mother     Heart attack Father         rheumatic heart disease    Arthritis Sister     Hypertension Sister     Blindness Sister     Heart attack Maternal Grandmother     Heart failure Maternal Grandfather     Asthma Maternal Aunt     Asthma Maternal Uncle     Hypertension Sister     Breast cancer Neg Hx     Ovarian cancer Neg Hx      Social History     Tobacco Use    Smoking status: Never    Smokeless tobacco: Never    Tobacco comments:     second hand smoke   Vaping Use    Vaping status: Never Used    Passive vaping exposure: Yes   Substance Use Topics    Alcohol use: No    Drug use: Never       Review of Systems  Pertinent items are noted in HPI    Vital Signs  /84   Pulse 79   Temp 97.7 °F (36.5 °C) (Oral)   Resp 14   Ht 162.6 cm (64.02\")   Wt 77.6 kg (171 lb 1.2 oz)   SpO2 98%   BMI 29.35 kg/m²     Physical Exam:    General " Appearance:    Alert, cooperative, in no acute distress   Head:    Normocephalic, without obvious abnormality, atraumatic   Eyes:            Lids and lashes normal, conjunctivae and sclerae normal, no   icterus, no pallor, corneas clear,    Ears:    Ears appear intact with no abnormalities noted   Throat:   No oral lesions, no thrush, oral mucosa moist   Neck:   No adenopathy, supple, trachea midline, no thyromegaly         Lungs:     Clear to auscultation,respirations regular, even and                   unlabored    Heart:    Regular rhythm and normal rate, normal S1 and S2, no            murmur, no gallop   Abdomen:     Normal bowel sounds, no masses, no organomegaly, soft,        nontender, nondistended, no guarding, no rebound                 tenderness   Genitalia:    Deferred   Extremities: Left ankle edema.  Intact pulses and cap refill distally.   Pulses:   Pulses palpable and equal bilaterally   Skin:   No bleeding, bruising or rash           I reviewed the patient's new clinical results.       Results from last 7 days   Lab Units 10/24/24  1352   WBC 10*3/mm3 8.34   HEMOGLOBIN g/dL 11.5*   HEMATOCRIT % 35.5   PLATELETS 10*3/mm3 357     Results from last 7 days   Lab Units 10/24/24  1352   SODIUM mmol/L 138   POTASSIUM mmol/L 4.5   CHLORIDE mmol/L 102   CO2 mmol/L 23.0   BUN mg/dL 15   CREATININE mg/dL 1.24*   CALCIUM mg/dL 8.7   BILIRUBIN mg/dL 0.5   ALK PHOS U/L 86   ALT (SGPT) U/L 8   AST (SGOT) U/L 16   GLUCOSE mg/dL 121*     Lab Results   Component Value Date    HGBA1C 4.40 (L) 10/24/2024           Assessment and Plan:       Ankle fracture, bimalleolar, closed, left, initial encounter    CAD (coronary artery disease)    Essential hypertension    Dyslipidemia    GERD (gastroesophageal reflux disease)    Anxiety    Depression    CKD (chronic kidney disease) stage 3, GFR 30-59 ml/min      Plan  1. PT/OT.  Weightbearing per orthopedic surgery.  2. Pain control-prns, multimodal approach.   3.  IS-encourage  4. DVT proph- mechanicals and aspirin.  5. Bowel regimen  6. Resume home medications as appropriate  7. Monitor post-op labs  8. DC planning for home versus inpatient rehab    - Hypertension:  Resume home medications as appropriate, formulary substitution when indicated.  Holding parameters.  PRN medications for elevated blood pressure.     -GERD:  Resume PPI.  Formulary substitution when indicated.    -Dyslipidemia:  Resume home regimen statin ( formulary substitution when appropriate).    -CKD: Monitor renal function closely.  Avoid nephrotoxic agents.    -Depression and anxiety: Maintained on home regimen including sertraline and Wellbutrin XL.      Dragon disclaimer:  Part of this encounter note is an electronic transcription/translation of spoken language to printed text. The electronic translation of spoken language may permit erroneous, or at times, nonsensical words or phrases to be inadvertently transcribed; Although I have reviewed the note for such errors, some may still exist.    Anne Znuiga MD  10/25/24  21:23 EDT            Electronically signed by Anne Zuniga MD at 10/25/24 4919       Conchis Abarca APRN at 10/25/24 1245       Attestation signed by Alex Martinez MD at 10/25/24 7291    I have seen and examined this patient and agree with note as written, will proceed to OR for surgery as scheduled                    Pre-Op H&P  Nallely MAGDIEL Eliot  [id number]  1950      Chief complaint: Left ankle fracture      Subjective:  Patient is a 74 y.o.female presents for scheduled surgery by Dr. Martinez. She anticipates a ANKLE OPEN REDUCTION INTERNAL FIXATION  today. She had a fall at home on 10/19/24 and injured her left ankle. She went to ER on 4/20/24 and was found to have a mildly displaced distal fibular and medial malleolar fractures. She was placed in a splint and has been partial weight-bearing with a walker.      Review of Systems:  Constitutional-- No fever,  chills or sweats. No fatigue.  CV-- No chest pain, palpitation or syncope. +HTN, HLD, CAD  Resp-- No SOB, cough, hemoptysis  Skin--No rashes or lesions      Allergies: No Known Allergies      Home Meds:  Medications Prior to Admission   Medication Sig Dispense Refill Last Dose/Taking    acetaminophen (TYLENOL) 500 MG tablet Take 1 tablet by mouth As Needed for Mild Pain.       ALPRAZolam (XANAX) 1 MG tablet Take 1 tablet by mouth 3 (Three) Times a Day As Needed for Anxiety.  0     aspirin 81 MG EC tablet Take 1 tablet by mouth Daily.       benzonatate (TESSALON) 100 MG capsule Take 1 capsule by mouth As Needed for Cough.       buPROPion XL (WELLBUTRIN XL) 300 MG 24 hr tablet Take 1 tablet by mouth Daily.  0     carboxymethylcellulose (REFRESH PLUS) 0.5 % solution Administer 2 drops to both eyes 3 (Three) Times a Day As Needed for Dry Eyes.       carvedilol (COREG) 12.5 MG tablet Take 1 tablet by mouth 2 (Two) Times a Day With Meals.       Cholecalciferol (Vitamin D3) 50 MCG (2000 UT) capsule Take 1 capsule by mouth every night at bedtime. 42 capsule 0     clopidogrel (PLAVIX) 75 MG tablet Take 1 tablet by mouth Daily. (Patient taking differently: Take 1 tablet by mouth Daily. Office stated pt can continue; however, pt stopped 10/19/24) 90 tablet 3     diclofenac (VOLTAREN) 1 % gel gel Apply 4 g topically As Needed.       dicyclomine (BENTYL) 10 MG capsule Take 1 capsule by mouth As Needed for Abdominal Cramping.  0     fluticasone (FLONASE) 50 MCG/ACT nasal spray Administer 1 spray into the nostril(s) as directed by provider Daily.  0     HYDROcodone-acetaminophen (NORCO) 5-325 MG per tablet Take 1 tablet by mouth Every 6 (Six) Hours As Needed for Severe Pain. 12 tablet 0     ibuprofen (ADVIL,MOTRIN) 600 MG tablet Take 1 tablet by mouth Every 6 (Six) Hours As Needed for Mild Pain. 20 tablet 0     lansoprazole (PREVACID) 30 MG capsule Take 1 capsule by mouth 2 (Two) Times a Day.       losartan (COZAAR) 50 MG tablet  Take 2 tablets by mouth Every Night.       MEGARED OMEGA-3 KRILL  MG capsule Take 1,000 mg by mouth Daily.       methenamine (HIPREX) 1 g tablet Take 1 tablet by mouth Every 12 (Twelve) Hours.       Multiple Vitamins-Minerals (PRESERVISION AREDS 2 PO) Take 1 tablet by mouth 2 (Two) Times a Day.       naloxone (NARCAN) 4 MG/0.1ML nasal spray Call 911. Don't prime. [place] in 1 nostril for overdose. Repeat in 2-3 minutes in other nostril if no or minimal breathing/responsiveness. (Patient taking differently: Administer 1 spray into the nostril(s) as directed by provider As Needed for Opioid Reversal. Call 911. Don't prime. [place] in 1 nostril for overdose. Repeat in 2-3 minutes in other nostril if no or minimal breathing/responsiveness.) 2 each 0     ondansetron ODT (ZOFRAN-ODT) 4 MG disintegrating tablet Place 1 tablet on the tongue 4 (Four) Times a Day As Needed for Nausea or Vomiting.       Polyethylene Glycol 3350 (MIRALAX PO) As Needed.       pravastatin (PRAVACHOL) 20 MG tablet Take 1 tablet by mouth every night at bedtime. 90 tablet 3     sertraline (ZOLOFT) 50 MG tablet Take 1.5 tablets by mouth Daily.            PMH:   Past Medical History:   Diagnosis Date    Anxiety     CAD (coronary artery disease)     CKD (chronic kidney disease) stage 3, GFR 30-59 ml/min     Depression     GERD (gastroesophageal reflux disease)     Hyperlipidemia     Hypertension     Myocardial infarction 10/18/2011    Obesity (BMI 30.0-34.9)     Pyelonephritis     Pyelonephritis, acute 05/24/2018    Right sided    Sepsis     Sepsis 05/23/2018    UTI (urinary tract infection)      PSH:    Past Surgical History:   Procedure Laterality Date    CARDIAC CATHETERIZATION  10/18/2011    COLONOSCOPY      CORONARY ANGIOPLASTY  10/18/2011    EYE SURGERY      ORIF HUMERUS FRACTURE      POLYPECTOMY      SINUS SURGERY         Immunization History:  Influenza: UTD  Pneumococcal: UTD  Tetanus: UTD    Social History:   Tobacco:   Social History      Tobacco Use   Smoking Status Never   Smokeless Tobacco Never   Tobacco Comments    second hand smoke      Alcohol:     Social History     Substance and Sexual Activity   Alcohol Use No         Physical Exam: VS: /84  HR 76  RR 16  T 97.5  Sat 96%RA      General Appearance:    Alert, cooperative, no distress, appears stated age   Head:    Normocephalic, without obvious abnormality, atraumatic   Lungs:     Clear to auscultation bilaterally, respirations unlabored    Heart:   Regular rate and rhythm, S1 and S2 normal    Abdomen:    Soft without tenderness   Extremities:   Extremities normal, atraumatic, no cyanosis or edema   Skin:   Skin color, texture, turgor normal, no rashes or lesions   Neurologic:   Grossly intact     Results Review:     LABS:  Lab Results   Component Value Date    WBC 8.34 10/24/2024    HGB 11.5 (L) 10/24/2024    HCT 35.5 10/24/2024    .9 (H) 10/24/2024     10/24/2024    NEUTROABS 5.71 10/24/2024    GLUCOSE 121 (H) 10/24/2024    BUN 15 10/24/2024    CREATININE 1.24 (H) 10/24/2024    EGFRIFNONA 54 (L) 11/18/2021     10/24/2024    K 4.5 10/24/2024     10/24/2024    CO2 23.0 10/24/2024    MG 2.0 05/24/2018    PHOS 3.4 11/17/2021    CALCIUM 8.7 10/24/2024    ALBUMIN 3.5 10/24/2024    AST 16 10/24/2024    ALT 8 10/24/2024    BILITOT 0.5 10/24/2024       RADIOLOGY:  Study Result    Narrative & Impression   XR ANKLE 3+ VW LEFT, XR FOOT 3+ VW LEFT     Date of Exam: 10/20/2024 6:26 PM EDT     Indication: fall pain     Comparison: None available.     Findings:  Left ankle: Mildly displaced oblique fracture of the distal fibula extending to the level of the tibial plafond. Minimally displaced medial malleolar fracture. There is soft tissue swelling about the ankle.     Left foot: There are degenerative changes of the first MTP joint. There is chronic flattening of the second metatarsal head. No acute fracture in the foot.     IMPRESSION:  Impression:  Mildly displaced  "distal fibular and medial malleolar fractures.         I reviewed the patient's new clinical results.    Cancer Staging (if applicable)  Cancer Patient: __ yes __no __unknown; If yes, clinical stage T:__ N:__M:__, stage group or __N/A      Impression: Mildly displaced distal fibular and medial malleolar fractures      Plan: ANKLE OPEN REDUCTION INTERNAL FIXATION - left      PARISA Stevens   10/25/2024   12:46 EDT    Electronically signed by Alex Martinez MD at 10/25/24 1452          Physician Progress Notes (most recent note)        Anne Zuniga MD at 10/27/24 1317          IM progress note      Nallely Mccabe  [id number]  1950     LOS: 0 days     Attending: Alex Martinez MD    Primary Care Provider: Shay Rivera MD      Chief Complaint/Reason for visit:  No chief complaint on file.      Subjective   Good pain control.  No nausea, vomiting, or shortness of breath.  Awaiting PT and OT sessions today.  Passing flatus but no BM yet.    Objective        Visit Vitals  /77 (BP Location: Right arm, Patient Position: Lying)   Pulse 70   Temp 97.8 °F (36.6 °C) (Oral)   Resp 18   Ht 162.6 cm (64.02\")   Wt 77.6 kg (171 lb 1.2 oz)   SpO2 94%   BMI 29.35 kg/m²     Temp (24hrs), Av °F (36.7 °C), Min:97.8 °F (36.6 °C), Max:98.2 °F (36.8 °C)      Intake/Output:    Intake/Output Summary (Last 24 hours) at 10/27/2024 1317  Last data filed at 10/27/2024 1242  Gross per 24 hour   Intake 360 ml   Output 2900 ml   Net -2540 ml        Physical Therapy:    10/26/2024    Physical Exam:     General Appearance:    Alert, cooperative, in no acute distress   Head:    Normocephalic, without obvious abnormality, atraumatic    Lungs:     Normal effort, symmetric chest rise,  clear to      auscultation bilaterally              Heart:    Regular rhythm and normal rate, normal S1 and S2    Abdomen:     Normal bowel sounds, no masses, no organomegaly, soft        nontender, nondistended, no guarding, no " rebound                tenderness   Extremities: Clean dry and intact splint/dressing left lower extremity.  Peripheral nerve block catheter present.  Able to wiggle toes.      Pulses:   Pulses palpable and equal bilaterally   Skin:   No bleeding, bruising or rash          Results Review:     I reviewed the patient's new clinical results.   Results from last 7 days   Lab Units 10/24/24  1352   WBC 10*3/mm3 8.34   HEMOGLOBIN g/dL 11.5*   HEMATOCRIT % 35.5   PLATELETS 10*3/mm3 357     Results from last 7 days   Lab Units 10/24/24  1352   SODIUM mmol/L 138   POTASSIUM mmol/L 4.5   CHLORIDE mmol/L 102   CO2 mmol/L 23.0   BUN mg/dL 15   CREATININE mg/dL 1.24*   CALCIUM mg/dL 8.7   BILIRUBIN mg/dL 0.5   ALK PHOS U/L 86   ALT (SGPT) U/L 8   AST (SGOT) U/L 16   GLUCOSE mg/dL 121*     I reviewed the patient's new imaging including images and reports.        All medications reviewed.   aspirin, 81 mg, Oral, Daily  buPROPion XL, 300 mg, Oral, Daily  carboxymethylcellulose, 2 drop, Both Eyes, TID  carvedilol, 12.5 mg, Oral, BID With Meals  cholecalciferol, 2,000 Units, Oral, Daily  clopidogrel, 75 mg, Oral, Daily  fluticasone, 1 spray, Nasal, Daily  losartan, 100 mg, Oral, Nightly  meloxicam, 7.5 mg, Oral, Daily  pantoprazole, 40 mg, Oral, Q AM  pravastatin, 20 mg, Oral, Daily  sertraline, 75 mg, Oral, Daily      acetaminophen, 650 mg, Q4H PRN   Or  acetaminophen, 650 mg, Q4H PRN  ALPRAZolam, 1 mg, TID PRN  benzonatate, 100 mg, Q8H PRN  HYDROmorphone, 0.5 mg, Q2H PRN   And  naloxone, 0.4 mg, Q5 Min PRN  labetalol, 10 mg, Q4H PRN  ondansetron, 4 mg, Q6H PRN  ondansetron ODT, 4 mg, Q6H PRN  oxyCODONE-acetaminophen, 1 tablet, Q4H PRN  oxyCODONE-acetaminophen, 1 tablet, Q4H PRN  sodium chloride, 500 mL, TID PRN        Assessment & Plan       S/P ORIF (open reduction internal fixation) fracture, left ankle bimalleolar    CAD (coronary artery disease)    Essential hypertension    Dyslipidemia    GERD (gastroesophageal reflux  disease)    Anxiety    Depression    Ankle fracture, bimalleolar, closed, left, initial encounter    CKD (chronic kidney disease) stage 3, GFR 30-59 ml/min         Plan     1. PT/OT.  Continue  2. Pain control-prns, multimodal approach.      3. IS-encourage  4. DVT proph- mechanicals and aspirin.  5. Bowel regimen  6. Resume home medications as appropriate  7. Monitor post-op labs  8. DC planning, likely to inpatient rehab,  to follow     - Hypertension:  Resume home medications as appropriate, formulary substitution when indicated.  Holding parameters.  PRN medications for elevated blood pressure.      -GERD:  Resume PPI.  Formulary substitution when indicated.     -Dyslipidemia:  Resumed home regimen statin ( formulary substitution when appropriate).     -CKD: Monitor renal function closely.  Avoid nephrotoxic agents.     -Depression and anxiety: Maintained on home regimen including sertraline and Wellbutrin XL.    Anne Zuniga MD  10/27/24  13:17 EDT      Electronically signed by Anne Zuniga MD at 10/27/24 1319          Physical Therapy Notes (most recent note)        Elva Drake at 10/28/24 0900  Version 1 of 1         Patient Name: Nallely Mccabe  : 1950    MRN: [id number]                              Today's Date: 10/28/2024       Admit Date: 10/25/2024    Visit Dx: No diagnosis found.  Patient Active Problem List   Diagnosis    CAD (coronary artery disease)    Essential hypertension    Dyslipidemia    GERD (gastroesophageal reflux disease)    Anxiety    Depression    Obesity (BMI 30-39.9)    Metabolic encephalopathy    Acute hyponatremia    Fall    Rib fractures    Bacteriuria    Ankle fracture, bimalleolar, closed, left, initial encounter    CKD (chronic kidney disease) stage 3, GFR 30-59 ml/min    S/P ORIF (open reduction internal fixation) fracture, left ankle bimalleolar     Past Medical History:   Diagnosis Date    Anxiety     CAD (coronary artery disease)     CKD  (chronic kidney disease) stage 3, GFR 30-59 ml/min     Depression     GERD (gastroesophageal reflux disease)     Hyperlipidemia     Hypertension     Myocardial infarction 10/18/2011    Obesity (BMI 30.0-34.9)     Pyelonephritis     Pyelonephritis, acute 05/24/2018    Right sided    Sepsis     Sepsis 05/23/2018    UTI (urinary tract infection)      Past Surgical History:   Procedure Laterality Date    ANKLE OPEN REDUCTION INTERNAL FIXATION Left 10/25/2024    Procedure: ANKLE OPEN REDUCTION INTERNAL FIXATION LEFT;  Surgeon: lAex Martinez MD;  [facility];  Service: Orthopedics;  Laterality: Left;    CARDIAC CATHETERIZATION  10/18/2011    COLONOSCOPY      CORONARY ANGIOPLASTY  10/18/2011    EYE SURGERY      ORIF HUMERUS FRACTURE      POLYPECTOMY      SINUS SURGERY        General Information       Row Name 10/28/24 1017          Physical Therapy Time and Intention    Document Type therapy note (daily note)  -KG     Mode of Treatment physical therapy;individual therapy  -KG       Row Name 10/28/24 1017          General Information    Patient Profile Reviewed yes  -KG     Existing Precautions/Restrictions left;non-weight bearing;fall  -KG     Barriers to Rehab none identified  -KG       Row Name 10/28/24 1017          Cognition    Orientation Status (Cognition) oriented x 3  -KG       Row Name 10/28/24 1017          Safety Issues/Impairments Affecting Functional Mobility    Safety Issues Affecting Function (Mobility) insight into deficits/self-awareness;awareness of need for assistance;positioning of assistive device  -KG     Impairments Affecting Function (Mobility) balance;endurance/activity tolerance;strength;range of motion (ROM);pain  -KG               User Key  (r) = Recorded By, (t) = Taken By, (c) = Cosigned By      Initials Name Provider Type    KG Elva Drake Physical Therapist                   Mobility       Row Name 10/28/24 1018          Bed Mobility    Bed Mobility supine-sit  -KG      Supine-Sit [place] (Bed Mobility) standby assist;verbal cues  -KG     Assistive Device (Bed Mobility) head of bed elevated;bed rails  -KG       Row Name 10/28/24 1018          Transfers    Comment, (Transfers) STS, CGA, FWW good adherence with NWB LLE, however difficulty safety SPT to chair, pt wanted to sit prematurely before being properly aligned  -KG       Row Name 10/28/24 1018          Sit-Stand Transfer    Sit-Stand [place] (Transfers) contact guard;verbal cues;1 person assist  -KG     Assistive Device (Sit-Stand Transfers) walker, front-wheeled  -KG       Row Name 10/28/24 1018          Gait/Stairs (Locomotion)    [place] Level (Gait) contact guard;verbal cues;1 person assist;1 person to manage equipment  -KG     Assistive Device (Gait) walker, front-wheeled  -KG     Patient was able to Ambulate yes  -KG     Distance in Feet (Gait) 20  -KG     Deviations/Abnormal Patterns (Gait) festinating/shuffling;gait speed decreased;stride length decreased  -KG     Bilateral Gait Deviations forward flexed posture  -KG     Comment, (Gait/Stairs) Pt able to ambulate 20', FWW, CGA with chair follow, pt did well with hop to gait pattern.  However, became very fatigued and chair needed to be pulled up behind to sit.  -KG       Row Name 10/28/24 1018          Mobility    Extremity Weight-bearing Status left lower extremity  -KG     Left Lower Extremity (Weight-bearing Status) non weight-bearing (NWB)  -KG               User Key  (r) = Recorded By, (t) = Taken By, (c) = Cosigned By      Initials Name Provider Type    Elva Edmondson Physical Therapist                   Obj/Interventions       Row Name 10/28/24 1022          Motor Skills    Therapeutic Exercise other (see comments)  SLR, quad sets, LAQ, chair push ups  -KG       Row Name 10/28/24 1022          Balance    Dynamic Standing Balance contact guard;2-person assist  -KG     Position/Device Used, Standing Balance supported;walker, front-wheeled  -KG      Balance Interventions standing;sit to stand  -KG               User Key  (r) = Recorded By, (t) = Taken By, (c) = Cosigned By      Initials Name Provider Type    Elva Edmondson Physical Therapist                   Goals/Plan    No documentation.                  Clinical Impression       Row Name 10/28/24 1023          Pain    Pretreatment Pain Rating 0/10 - no pain  -KG     Posttreatment Pain Rating 0/10 - no pain  -KG       Row Name 10/28/24 1023          Plan of Care Review    Plan of Care Reviewed With patient  -KG     Progress improving  -KG     Outcome Evaluation STS, CGA, FWW good adherence with NWB LLE, however difficulty safety SPT to chair, pt wanted to sit prematurely before being properly aligned.  Pt able to ambulate 20', FWW, CGA with chair follow, pt did well with hop to gait pattern. However, became very fatigued and chair needed to be pulled up behind to sit.  continued recommendation IPR  -KG       Row Name 10/28/24 1023          Positioning and Restraints    Pre-Treatment Position in bed  -KG     Post Treatment Position chair  -KG     In Chair notified nsg;call light within reach;encouraged to call for assist;exit alarm on;waffle cushion;legs elevated;on mechanical lift sling;compression device  -KG               User Key  (r) = Recorded By, (t) = Taken By, (c) = Cosigned By      Initials Name Provider Type    Elva Edmondson Physical Therapist                   Outcome Measures       Row Name 10/28/24 1025          How much help from another person do you currently need...    Turning from your back to your side while in flat bed without using bedrails? 4  -KG     Moving from lying on back to sitting on the side of a flat bed without bedrails? 4  -KG     Moving to and from a bed to a chair (including a wheelchair)? 3  -KG     Standing up from a chair using your arms (e.g., wheelchair, bedside chair)? 3  -KG     Climbing 3-5 steps with a railing? 2  -KG     To walk in hospital room?  3  -KG     AM-PAC 6 Clicks Score (PT) 19  -KG     Highest Level of Mobility Goal 6 --> Walk 10 steps or more  -KG       Row Name 10/28/24 1025          Functional Assessment    Outcome Measure Options AM-PAC 6 Clicks Basic Mobility (PT)  -KG               User Key  (r) = Recorded By, (t) = Taken By, (c) = Cosigned By      Initials Name Provider Type    NICHO Elva Drake Physical Therapist                                 Physical Therapy Education       Title: PT OT SLP Therapies (Done)       Topic: Physical Therapy (Done)       Point: Mobility training (Done)       Learning Progress Summary            Patient Acceptance, E,D, VU,NR by SD at 10/27/2024 1340    Eager, E,D, VU,DU,NR by  at 10/26/2024 1211    Comment: Reviewed LLE NWB status, HEP, PT POC, and DC recommendations.   Other Eager, E,D, VU,DU,NR by  at 10/26/2024 1211    Comment: Reviewed LLE NWB status, HEP, PT POC, and DC recommendations.                      Point: Home exercise program (Done)       Learning Progress Summary            Patient Acceptance, E,D, VU,NR by SD at 10/27/2024 1340    Eager, E,D, VU,DU,NR by  at 10/26/2024 1211    Comment: Reviewed LLE NWB status, HEP, PT POC, and DC recommendations.   Other Eager, E,D, VU,DU,NR by  at 10/26/2024 1211    Comment: Reviewed LLE NWB status, HEP, PT POC, and DC recommendations.                      Point: Body mechanics (Done)       Learning Progress Summary            Patient Acceptance, E,D, VU,NR by SD at 10/27/2024 1340    Eager, E,D, VU,DU,NR by  at 10/26/2024 1211    Comment: Reviewed LLE NWB status, HEP, PT POC, and DC recommendations.   Other Eager, E,D, VU,DU,NR by  at 10/26/2024 1211    Comment: Reviewed LLE NWB status, HEP, PT POC, and DC recommendations.                      Point: Precautions (Done)       Learning Progress Summary            Patient Acceptance, E,D, VU,NR by SD at 10/27/2024 1340    Eager, E,D, VU,DU,NR by  at 10/26/2024 1211    Comment: Reviewed JUANA MERCHANT  status, HEP, PT POC, and DC recommendations.   Other Salazar, OUMOU VELOZ, CHELY,DU,NR by  at 10/26/2024 1211    Comment: Reviewed LLE NWB status, HEP, PT POC, and DC recommendations.                                      User Key       Initials Effective Dates Name Provider Type Discipline    SD 03/13/23 -  Nannette Crook, PT Physical Therapist PT     09/21/21 -  Bill Levy, PT Physical Therapist PT                  PT Recommendation and Plan     Progress: improving  Outcome Evaluation: STS, CGA, FWW good adherence with NWB LLE, however difficulty safety SPT to chair, pt wanted to sit prematurely before being properly aligned.  Pt able to ambulate 20', FWW, CGA with chair follow, pt did well with hop to gait pattern. However, became very fatigued and chair needed to be pulled up behind to sit.  continued recommendation IPR     Time Calculation:         PT Charges       Row Name 10/28/24 1025             Time Calculation    Start Time 0900  -KG      PT Received On 10/28/24  -KG         Time Calculation- PT    Total Timed Code Minutes- PT 40 minute(s)  -KG         Timed Charges    42420 - PT Therapeutic Exercise Minutes 15  -KG      35521 - Gait Training Minutes  15  -KG      94143 - PT Therapeutic Activity Minutes 10  -KG         Total Minutes    Timed Charges Total Minutes 40  -KG       Total Minutes 40  -KG                User Key  (r) = Recorded By, (t) = Taken By, (c) = Cosigned By      Initials Name Provider Type    KG Elva Drake Physical Therapist                  Therapy Charges for Today       Code Description Service Date Service Provider Modifiers Qty    68970913100 HC PT THER PROC EA 15 MIN 10/28/2024 Elva Drake GP 1    96148131177 HC GAIT TRAINING EA 15 MIN 10/28/2024 Elva Drake GP 1    39941118438 HC PT THERAPEUTIC ACT EA 15 MIN 10/28/2024 Elva Drake GP 1            PT G-Codes  Outcome Measure Options: AM-PAC 6 Clicks Basic Mobility (PT)  AM-PAC 6 Clicks Score (PT):  19  AM-PAC 6 Clicks Score (OT): 17       Elva Drake  10/28/2024      Electronically signed by Elva Drake at 10/28/24 1026       Occupational Therapy Notes (most recent note)    No notes exist for this encounter.

## 2024-10-28 NOTE — PLAN OF CARE
10/29/18 2118 Patient called ED to ask when she should start taking her doxycycline and prednisone prescriptions. Patient instructed to start doxycycline tonight and prednisone in the morning.       Rebeca Martinez RN  10/29/18 2019 Goal Outcome Evaluation:     Up to bsc w 1 assist today.  VSS on RA    Non tele.   Pain control adequate w po meds.     Plan is to DC Tues 11:30am

## 2024-10-28 NOTE — PROGRESS NOTES
"IM progress note      Nallely Mccabe  [id number]  1950     LOS: 0 days     Attending: Alex Martinez MD    Primary Care Provider: Shay Rivera MD      Chief Complaint/Reason for visit:  No chief complaint on file.      Subjective   Good pain control.  No nausea, vomiting, or shortness of breath.       Objective        Visit Vitals  /83 (BP Location: Right arm, Patient Position: Lying)   Pulse 80   Temp 97.9 °F (36.6 °C) (Oral)   Resp 16   Ht 162.6 cm (64.02\")   Wt 77.6 kg (171 lb 1.2 oz)   SpO2 97%   BMI 29.35 kg/m²     Temp (24hrs), Av °F (36.7 °C), Min:97.9 °F (36.6 °C), Max:98.1 °F (36.7 °C)      Intake/Output:    Intake/Output Summary (Last 24 hours) at 10/28/2024 1336  Last data filed at 10/28/2024 0700  Gross per 24 hour   Intake 360 ml   Output 800 ml   Net -440 ml        Physical Therapy:     Elva Drake   Physical Therapist  Specialty: Physical Therapy     Plan of Care     Signed     Date of Service: 10/28/24 0900  Creation Time: 10/28/24 1025     Signed         Goal Outcome Evaluation:  Plan of Care Reviewed With: patient  Progress: improving  Outcome Evaluation: STS, CGA, FWW good adherence with NWB LLE, however difficulty safety SPT to chair, pt wanted to sit prematurely before being properly aligned.  Pt able to ambulate 20', FWW, CGA with chair follow, pt did well with hop to gait pattern. However, became very fatigued and chair needed to be pulled up behind to sit.  continued recommendation IPR                   Physical Exam:     General Appearance:    Alert, cooperative, in no acute distress   Head:    Normocephalic, without obvious abnormality, atraumatic    Lungs:     Normal effort, symmetric chest rise,  clear to      auscultation bilaterally              Heart:    Regular rhythm and normal rate, normal S1 and S2    Abdomen:     Normal bowel sounds, no masses, no organomegaly, soft        nontender, nondistended, no guarding, no rebound                tenderness "   Extremities: Clean dry and intact splint/dressing left lower extremity.  Peripheral nerve block catheter present.  Able to wiggle toes.      Pulses:   Pulses palpable and equal bilaterally   Skin:   No bleeding, bruising or rash          Results Review:     I reviewed the patient's new clinical results.   Results from last 7 days   Lab Units 10/28/24  0633 10/24/24  1352   WBC 10*3/mm3 6.48 8.34   HEMOGLOBIN g/dL 10.1* 11.5*   HEMATOCRIT % 31.3* 35.5   PLATELETS 10*3/mm3 291 357     Results from last 7 days   Lab Units 10/28/24  0634 10/24/24  1352   SODIUM mmol/L 141 138   POTASSIUM mmol/L 3.7 4.5   CHLORIDE mmol/L 107 102   CO2 mmol/L 28.0 23.0   BUN mg/dL 16 15   CREATININE mg/dL 1.00 1.24*   CALCIUM mg/dL 8.3* 8.7   BILIRUBIN mg/dL  --  0.5   ALK PHOS U/L  --  86   ALT (SGPT) U/L  --  8   AST (SGOT) U/L  --  16   GLUCOSE mg/dL 95 121*     I reviewed the patient's new imaging including images and reports.        All medications reviewed.   aspirin, 81 mg, Oral, Daily  buPROPion XL, 300 mg, Oral, Daily  carboxymethylcellulose, 2 drop, Both Eyes, TID  carvedilol, 12.5 mg, Oral, BID With Meals  cholecalciferol, 2,000 Units, Oral, Daily  clopidogrel, 75 mg, Oral, Daily  fluticasone, 1 spray, Nasal, Daily  losartan, 100 mg, Oral, Nightly  meloxicam, 7.5 mg, Oral, Daily  pantoprazole, 40 mg, Oral, Q AM  pravastatin, 20 mg, Oral, Daily  sertraline, 75 mg, Oral, Daily      acetaminophen, 650 mg, Q4H PRN   Or  acetaminophen, 650 mg, Q4H PRN  ALPRAZolam, 1 mg, TID PRN  benzonatate, 100 mg, Q8H PRN  HYDROmorphone, 0.5 mg, Q2H PRN   And  naloxone, 0.4 mg, Q5 Min PRN  labetalol, 10 mg, Q4H PRN  ondansetron, 4 mg, Q6H PRN  ondansetron ODT, 4 mg, Q6H PRN  oxyCODONE-acetaminophen, 1 tablet, Q4H PRN  oxyCODONE-acetaminophen, 1 tablet, Q4H PRN  sodium chloride, 500 mL, TID PRN        Assessment & Plan       S/P ORIF (open reduction internal fixation) fracture, left ankle bimalleolar    CAD (coronary artery disease)    Essential  hypertension    Dyslipidemia    GERD (gastroesophageal reflux disease)    Anxiety    Depression    Ankle fracture, bimalleolar, closed, left, initial encounter    CKD (chronic kidney disease) stage 3, GFR 30-59 ml/min         Plan     1. PT/OT.  Continue  2. Pain control-prns, multimodal approach.  PNB cath.   3. IS-encourage  4. DVT proph- mechanicals and aspirin.  5. Bowel regimen  6. Resume home medications as appropriate  7. Monitor post-op labs  8. AL planning, to [facility] tomorrow.      - Hypertension:  Resume home medications as appropriate, formulary substitution when indicated.  Holding parameters.  PRN medications for elevated blood pressure.      -GERD:  Resume PPI.  Formulary substitution when indicated.     -Dyslipidemia:  Resumed home regimen statin ( formulary substitution when appropriate).     -CKD: Monitor renal function closely.  Avoid nephrotoxic agents.     -Depression and anxiety: Maintained on home regimen including sertraline and Wellbutrin XL.    Anne Zuniga MD  10/28/24  13:36 EDT

## 2024-10-28 NOTE — PLAN OF CARE
Goal Outcome Evaluation:  Plan of Care Reviewed With: patient        Progress: improving  Outcome Evaluation: STS, CGA, FWW good adherence with NWB LLE, however difficulty safety SPT to chair, pt wanted to sit prematurely before being properly aligned.  Pt able to ambulate 20', FWW, CGA with chair follow, pt did well with hop to gait pattern. However, became very fatigued and chair needed to be pulled up behind to sit.  continued recommendation IPR

## 2024-10-28 NOTE — PROGRESS NOTES
[facility]    Acute pain service Inpatient Progress Note    Patient Name: Nallely Mccabe  :  1950  MRN:  [id number]        Acute Pain  Service Inpatient Progress Note:    Analgesia:Good  Pain Score:0/10  LOC: alert and awake  Resp Status: room air  Cardiac: VS stable  Side Effects:None  Catheter Site:clean, dressing intact and dry  Cath type: peripheral nerve cath(InfuSystem)  Volume: 1mL,5ml, 5ml InfuSystem Pump.  Catheter Plan:Catheter to remain Insitu and Continue catheter infusion rate unchanged  Comments:

## 2024-10-28 NOTE — CASE MANAGEMENT/SOCIAL WORK
Discharge Planning Assessment  [facility]     Patient Name: Nallely Mccabe  MRN: [id number]  Today's Date: 10/28/2024    Admit Date: 10/25/2024    Plan:  accepted for SRU, pending insuance   Discharge Needs Assessment    No documentation.                  Discharge Plan       Row Name 10/28/24 1206       Plan    Plan [facility] accepted for SRU, pending insuance    Patient/Family in Agreement with Plan yes    Plan Comments [facility] SRU accepted patient pending insurance. Meli/CM submitted to insurance today. CM following    Final Discharge Disposition Code 03 - skilled nursing facility (SNF)                  Continued Care and Services - Admitted Since 10/25/2024       Destination Coordination complete.      Service Provider Request Status Services Address Phone Fax Patient Preferred    [facility]  Selected Skilled Nursing [address] [facility] [address] [phone] --                  Expected Discharge Date and Time       Expected Discharge Date Expected Discharge Time    Oct 29, 2024            Demographic Summary    No documentation.                  Functional Status    No documentation.                  Psychosocial    No documentation.                  Abuse/Neglect    No documentation.                  Legal    No documentation.                  Substance Abuse    No documentation.                  Patient Forms    No documentation.                     Laura Kat, RN

## 2024-10-28 NOTE — CASE MANAGEMENT/SOCIAL WORK
Case Management Discharge Note      Final Note: Patient transferring to  subacute bed on Tuesday, 10-29, insurance approved.  Transportation per [facility] van 10-29 at 1130. Please have patient at the Maternity entrance, [address] by 1120. I spoke with addi at  and updated and she still agrees with rehab. Call report [phone] and fax discharge summary to [phone]         Selected Continued Care - Admitted Since 10/25/2024       Destination Coordination complete.      Service Provider Services Address Phone Fax Patient Preferred    [facility] Skilled Nursing [address] [facility] [address] [phone] --              Durable Medical Equipment    No services have been selected for the patient.                Dialysis/Infusion    No services have been selected for the patient.                Home Medical Care    No services have been selected for the patient.                Therapy    No services have been selected for the patient.                Community Resources    No services have been selected for the patient.                Community & DME    No services have been selected for the patient.                    Transportation Services  Ambulance: [facility] Transportation    Final Discharge Disposition Code: 03 - skilled nursing facility (SNF)

## 2024-10-28 NOTE — PLAN OF CARE
Problem: Adult Inpatient Plan of Care  Goal: Plan of Care Review  Outcome: Progressing  Goal: Optimal Comfort and Wellbeing  Outcome: Progressing  Intervention: Provide Person-Centered Care  Recent Flowsheet Documentation  Taken 10/28/2024 0400 by Curly Greenwood RN  Trust Relationship/Rapport: care explained  Taken 10/28/2024 0200 by Curly Greenwood, RN  Trust Relationship/Rapport: care explained  Taken 10/28/2024 0000 by Curly Greenwood RN  Trust Relationship/Rapport: care explained  Taken 10/27/2024 2200 by Curly Greenwood, BALDEMAR  Trust Relationship/Rapport: care explained  Taken 10/27/2024 2000 by Curly Greenwood RN  Trust Relationship/Rapport:   care explained   choices provided   empathic listening provided   questions answered   questions encouraged   reassurance provided     Problem: Skin Injury Risk Increased  Goal: Skin Health and Integrity  Outcome: Progressing  Intervention: Optimize Skin Protection  Recent Flowsheet Documentation  Taken 10/28/2024 0400 by Curly Greenwood RN  Activity Management: activity encouraged  Pressure Reduction Techniques: frequent weight shift encouraged  Head of Bed (HOB) Positioning: HOB lowered  Pressure Reduction Devices: pressure-redistributing mattress utilized  Skin Protection: incontinence pads utilized  Taken 10/28/2024 0200 by Curly Greenwood RN  Activity Management: activity encouraged  Pressure Reduction Techniques: frequent weight shift encouraged  Head of Bed (HOB) Positioning: HOB lowered  Pressure Reduction Devices: pressure-redistributing mattress utilized  Skin Protection: incontinence pads utilized  Taken 10/28/2024 0000 by Curly Greenwood RN  Activity Management: activity encouraged  Pressure Reduction Techniques: frequent weight shift encouraged  Head of Bed (HOB) Positioning: HOB lowered  Pressure Reduction Devices: pressure-redistributing mattress utilized  Skin Protection: incontinence pads utilized  Taken 10/27/2024 2200 by  Curly Greenwood RN  Activity Management: activity encouraged  Pressure Reduction Techniques: frequent weight shift encouraged  Head of Bed (HOB) Positioning: HOB lowered  Pressure Reduction Devices: pressure-redistributing mattress utilized  Skin Protection: incontinence pads utilized  Taken 10/27/2024 2000 by Curly Greenwood RN  Activity Management: activity encouraged  Pressure Reduction Techniques: frequent weight shift encouraged  Head of Bed (HOB) Positioning: HOB lowered  Pressure Reduction Devices: pressure-redistributing mattress utilized  Skin Protection: incontinence pads utilized     Problem: Fall Injury Risk  Goal: Absence of Fall and Fall-Related Injury  Outcome: Progressing  Intervention: Identify and Manage Contributors  Recent Flowsheet Documentation  Taken 10/28/2024 0400 by Curly Greenwood RN  Medication Review/Management: medications reviewed  Taken 10/28/2024 0200 by Curly Greenwood RN  Medication Review/Management: medications reviewed  Taken 10/28/2024 0000 by Curly Greenwood RN  Medication Review/Management: medications reviewed  Taken 10/27/2024 2200 by Curly Greenwood RN  Medication Review/Management: medications reviewed  Taken 10/27/2024 2000 by Curly Greenwood RN  Medication Review/Management: medications reviewed  Intervention: Promote Injury-Free Environment  Recent Flowsheet Documentation  Taken 10/28/2024 0400 by Curly Greenwood RN  Safety Promotion/Fall Prevention:   activity supervised   assistive device/personal items within reach   clutter free environment maintained   fall prevention program maintained   gait belt   nonskid shoes/slippers when out of bed   room organization consistent   safety round/check completed   toileting scheduled  Taken 10/28/2024 0200 by Curly Greenwood RN  Safety Promotion/Fall Prevention:   activity supervised   assistive device/personal items within reach   clutter free environment maintained   fall prevention  program maintained   gait belt   nonskid shoes/slippers when out of bed   room organization consistent   safety round/check completed   toileting scheduled  Taken 10/28/2024 0000 by Curly Greenwood, RN  Safety Promotion/Fall Prevention:   activity supervised   assistive device/personal items within reach   clutter free environment maintained   fall prevention program maintained   gait belt   nonskid shoes/slippers when out of bed   room organization consistent   safety round/check completed   toileting scheduled  Taken 10/27/2024 2200 by Curly Greenwood, RN  Safety Promotion/Fall Prevention:   activity supervised   assistive device/personal items within reach   clutter free environment maintained   fall prevention program maintained   gait belt   nonskid shoes/slippers when out of bed   room organization consistent   safety round/check completed  Taken 10/27/2024 2000 by Curly Greenwood, RN  Safety Promotion/Fall Prevention:   activity supervised   assistive device/personal items within reach   clutter free environment maintained   fall prevention program maintained   gait belt   nonskid shoes/slippers when out of bed   room organization consistent   safety round/check completed   toileting scheduled     Problem: Adult Inpatient Plan of Care  Goal: Absence of Hospital-Acquired Illness or Injury  Intervention: Identify and Manage Fall Risk  Recent Flowsheet Documentation  Taken 10/28/2024 0400 by Curly Greenwood, RN  Safety Promotion/Fall Prevention:   activity supervised   assistive device/personal items within reach   clutter free environment maintained   fall prevention program maintained   gait belt   nonskid shoes/slippers when out of bed   room organization consistent   safety round/check completed   toileting scheduled  Taken 10/28/2024 0200 by Curly Greenwood, RN  Safety Promotion/Fall Prevention:   activity supervised   assistive device/personal items within reach   clutter free environment  maintained   fall prevention program maintained   gait belt   nonskid shoes/slippers when out of bed   room organization consistent   safety round/check completed   toileting scheduled  Taken 10/28/2024 0000 by Curly Greenwood RN  Safety Promotion/Fall Prevention:   activity supervised   assistive device/personal items within reach   clutter free environment maintained   fall prevention program maintained   gait belt   nonskid shoes/slippers when out of bed   room organization consistent   safety round/check completed   toileting scheduled  Taken 10/27/2024 2200 by Curly Greenwood RN  Safety Promotion/Fall Prevention:   activity supervised   assistive device/personal items within reach   clutter free environment maintained   fall prevention program maintained   gait belt   nonskid shoes/slippers when out of bed   room organization consistent   safety round/check completed  Taken 10/27/2024 2000 by Curly Greenwood RN  Safety Promotion/Fall Prevention:   activity supervised   assistive device/personal items within reach   clutter free environment maintained   fall prevention program maintained   gait belt   nonskid shoes/slippers when out of bed   room organization consistent   safety round/check completed   toileting scheduled  Intervention: Prevent Skin Injury  Recent Flowsheet Documentation  Taken 10/28/2024 0400 by Curly Greenwood, RN  Body Position: position changed independently  Skin Protection: incontinence pads utilized  Taken 10/28/2024 0200 by Curly Greenwood RN  Body Position: position changed independently  Skin Protection: incontinence pads utilized  Taken 10/28/2024 0000 by Curly Greenwood RN  Body Position: position changed independently  Skin Protection: incontinence pads utilized  Taken 10/27/2024 2200 by Curly Greenwood RN  Body Position: position changed independently  Skin Protection: incontinence pads utilized  Taken 10/27/2024 2000 by Curly Greenwood RN  Body  Position: position changed independently  Skin Protection: incontinence pads utilized  Intervention: Prevent and Manage VTE (Venous Thromboembolism) Risk  Recent Flowsheet Documentation  Taken 10/28/2024 0400 by Curly Greenwood RN  VTE Prevention/Management:   right   SCDs (sequential compression devices) on  Taken 10/28/2024 0000 by Curly Greenwood RN  VTE Prevention/Management:   right   SCDs (sequential compression devices) on  Taken 10/27/2024 2000 by Curly Greenwood RN  VTE Prevention/Management:   right   SCDs (sequential compression devices) on  Intervention: Prevent Infection  Recent Flowsheet Documentation  Taken 10/28/2024 0400 by Curly Greenwood RN  Infection Prevention:   environmental surveillance performed   hand hygiene promoted   rest/sleep promoted   single patient room provided  Taken 10/28/2024 0200 by Curly Greenwood RN  Infection Prevention:   environmental surveillance performed   hand hygiene promoted   rest/sleep promoted   single patient room provided  Taken 10/28/2024 0000 by Curly Greenwood RN  Infection Prevention:   environmental surveillance performed   hand hygiene promoted   rest/sleep promoted   single patient room provided  Taken 10/27/2024 2200 by Curly Greenwood RN  Infection Prevention:   environmental surveillance performed   hand hygiene promoted   rest/sleep promoted   single patient room provided  Taken 10/27/2024 2000 by Curly Greenwood RN  Infection Prevention:   environmental surveillance performed   hand hygiene promoted   rest/sleep promoted   single patient room provided  Goal: Absence of Hospital-Acquired Illness or Injury  Intervention: Identify and Manage Fall Risk  Recent Flowsheet Documentation  Taken 10/28/2024 0400 by Curly Greenwood RN  Safety Promotion/Fall Prevention:   activity supervised   assistive device/personal items within reach   clutter free environment maintained   fall prevention program maintained   gait belt    nonskid shoes/slippers when out of bed   room organization consistent   safety round/check completed   toileting scheduled  Taken 10/28/2024 0200 by Curly Greenwood RN  Safety Promotion/Fall Prevention:   activity supervised   assistive device/personal items within reach   clutter free environment maintained   fall prevention program maintained   gait belt   nonskid shoes/slippers when out of bed   room organization consistent   safety round/check completed   toileting scheduled  Taken 10/28/2024 0000 by Curly Greenwood RN  Safety Promotion/Fall Prevention:   activity supervised   assistive device/personal items within reach   clutter free environment maintained   fall prevention program maintained   gait belt   nonskid shoes/slippers when out of bed   room organization consistent   safety round/check completed   toileting scheduled  Taken 10/27/2024 2200 by Curly Greenwood RN  Safety Promotion/Fall Prevention:   activity supervised   assistive device/personal items within reach   clutter free environment maintained   fall prevention program maintained   gait belt   nonskid shoes/slippers when out of bed   room organization consistent   safety round/check completed  Taken 10/27/2024 2000 by Curly Greenwood RN  Safety Promotion/Fall Prevention:   activity supervised   assistive device/personal items within reach   clutter free environment maintained   fall prevention program maintained   gait belt   nonskid shoes/slippers when out of bed   room organization consistent   safety round/check completed   toileting scheduled  Intervention: Prevent Skin Injury  Recent Flowsheet Documentation  Taken 10/28/2024 0400 by Curly Greenwood, RN  Body Position: position changed independently  Skin Protection: incontinence pads utilized  Taken 10/28/2024 0200 by Curly Greenwood RN  Body Position: position changed independently  Skin Protection: incontinence pads utilized  Taken 10/28/2024 0000 by Timo  Curly COELLO RN  Body Position: position changed independently  Skin Protection: incontinence pads utilized  Taken 10/27/2024 2200 by Curly Greenwood RN  Body Position: position changed independently  Skin Protection: incontinence pads utilized  Taken 10/27/2024 2000 by Curly Greenwood RN  Body Position: position changed independently  Skin Protection: incontinence pads utilized  Intervention: Prevent and Manage VTE (Venous Thromboembolism) Risk  Recent Flowsheet Documentation  Taken 10/28/2024 0400 by Curly Greenwood RN  VTE Prevention/Management:   right   SCDs (sequential compression devices) on  Taken 10/28/2024 0000 by Curly Greenwood RN  VTE Prevention/Management:   right   SCDs (sequential compression devices) on  Taken 10/27/2024 2000 by Curly Greenwood RN  VTE Prevention/Management:   right   SCDs (sequential compression devices) on  Intervention: Prevent Infection  Recent Flowsheet Documentation  Taken 10/28/2024 0400 by Curly Greenwood RN  Infection Prevention:   environmental surveillance performed   hand hygiene promoted   rest/sleep promoted   single patient room provided  Taken 10/28/2024 0200 by Curly Greenwood RN  Infection Prevention:   environmental surveillance performed   hand hygiene promoted   rest/sleep promoted   single patient room provided  Taken 10/28/2024 0000 by Curly Greenwood RN  Infection Prevention:   environmental surveillance performed   hand hygiene promoted   rest/sleep promoted   single patient room provided  Taken 10/27/2024 2200 by Curly Greenwood RN  Infection Prevention:   environmental surveillance performed   hand hygiene promoted   rest/sleep promoted   single patient room provided  Taken 10/27/2024 2000 by Curly Greenwood RN  Infection Prevention:   environmental surveillance performed   hand hygiene promoted   rest/sleep promoted   single patient room provided  Goal: Optimal Comfort and Wellbeing  Intervention: Provide  Person-Centered Care  Recent Flowsheet Documentation  Taken 10/28/2024 0400 by Curly Greenwood, RN  Trust Relationship/Rapport: care explained  Taken 10/28/2024 0200 by Curly Greenwood RN  Trust Relationship/Rapport: care explained  Taken 10/28/2024 0000 by Curly Greenwood RN  Trust Relationship/Rapport: care explained  Taken 10/27/2024 2200 by Curly Greenwood RN  Trust Relationship/Rapport: care explained  Taken 10/27/2024 2000 by Curly Greenwood RN  Trust Relationship/Rapport:   care explained   choices provided   empathic listening provided   questions answered   questions encouraged   reassurance provided     Problem: Mobility Impairment  Goal: Optimal Mobility  Intervention: Optimize Mobility  Recent Flowsheet Documentation  Taken 10/28/2024 0400 by Curly Greenwood, RN  Activity Management: activity encouraged  Positioning/Transfer Devices:   pillows   in use  Taken 10/28/2024 0200 by Curly Greenwood RN  Activity Management: activity encouraged  Positioning/Transfer Devices:   pillows   in use  Taken 10/28/2024 0000 by Curly Greenwood RN  Activity Management: activity encouraged  Positioning/Transfer Devices:   pillows   in use  Taken 10/27/2024 2200 by Curly Greenwood RN  Activity Management: activity encouraged  Positioning/Transfer Devices:   pillows   in use  Taken 10/27/2024 2000 by Curly Greenwood RN  Activity Management: activity encouraged  Positioning/Transfer Devices:   pillows   in use   Goal Outcome Evaluation:         Pt a/o x4. VSS. Maintaining SpO2 on RA. Reports some discomfort with LLE splint after ambulation. Pain treated with prn percocet x1 with good effect. Pt rested well. LLE elevated. Dressing CDI. Infu pump dressing CDI.

## 2024-10-28 NOTE — THERAPY TREATMENT NOTE
Patient Name: Nallely Mccabe  : 1950    MRN: [id number]                              Today's Date: 10/28/2024       Admit Date: 10/25/2024    Visit Dx: No diagnosis found.  Patient Active Problem List   Diagnosis    CAD (coronary artery disease)    Essential hypertension    Dyslipidemia    GERD (gastroesophageal reflux disease)    Anxiety    Depression    Obesity (BMI 30-39.9)    Metabolic encephalopathy    Acute hyponatremia    Fall    Rib fractures    Bacteriuria    Ankle fracture, bimalleolar, closed, left, initial encounter    CKD (chronic kidney disease) stage 3, GFR 30-59 ml/min    S/P ORIF (open reduction internal fixation) fracture, left ankle bimalleolar     Past Medical History:   Diagnosis Date    Anxiety     CAD (coronary artery disease)     CKD (chronic kidney disease) stage 3, GFR 30-59 ml/min     Depression     GERD (gastroesophageal reflux disease)     Hyperlipidemia     Hypertension     Myocardial infarction 10/18/2011    Obesity (BMI 30.0-34.9)     Pyelonephritis     Pyelonephritis, acute 2018    Right sided    Sepsis     Sepsis 2018    UTI (urinary tract infection)      Past Surgical History:   Procedure Laterality Date    ANKLE OPEN REDUCTION INTERNAL FIXATION Left 10/25/2024    Procedure: ANKLE OPEN REDUCTION INTERNAL FIXATION LEFT;  Surgeon: Alex Martinez MD;  Location: [facility];  Service: Orthopedics;  Laterality: Left;    CARDIAC CATHETERIZATION  10/18/2011    COLONOSCOPY      CORONARY ANGIOPLASTY  10/18/2011    EYE SURGERY      ORIF HUMERUS FRACTURE      POLYPECTOMY      SINUS SURGERY        General Information       Row Name 10/28/24 1017          Physical Therapy Time and Intention    Document Type therapy note (daily note)  -KG     Mode of Treatment physical therapy;individual therapy  -KG       Row Name 10/28/24 1017          General Information    Patient Profile Reviewed yes  -KG     Existing Precautions/Restrictions left;non-weight bearing;fall  -KG      Barriers to Rehab none identified  -KG       Row Name 10/28/24 1017          Cognition    Orientation Status (Cognition) oriented x 3  -KG       Row Name 10/28/24 1017          Safety Issues/Impairments Affecting Functional Mobility    Safety Issues Affecting Function (Mobility) insight into deficits/self-awareness;awareness of need for assistance;positioning of assistive device  -KG     Impairments Affecting Function (Mobility) balance;endurance/activity tolerance;strength;range of motion (ROM);pain  -KG               User Key  (r) = Recorded By, (t) = Taken By, (c) = Cosigned By      Initials Name Provider Type    KG Elva Drake Physical Therapist                   Mobility       Row Name 10/28/24 1018          Bed Mobility    Bed Mobility supine-sit  -KG     Supine-Sit Candler (Bed Mobility) standby assist;verbal cues  -KG     Assistive Device (Bed Mobility) head of bed elevated;bed rails  -KG       Row Name 10/28/24 1018          Transfers    Comment, (Transfers) STS, CGA, FWW good adherence with NWB LLE, however difficulty safety SPT to chair, pt wanted to sit prematurely before being properly aligned  -KG       Row Name 10/28/24 1018          Sit-Stand Transfer    Sit-Stand Candler (Transfers) contact guard;verbal cues;1 person assist  -KG     Assistive Device (Sit-Stand Transfers) walker, front-wheeled  -KG       Row Name 10/28/24 1018          Gait/Stairs (Locomotion)    Candler Level (Gait) contact guard;verbal cues;1 person assist;1 person to manage equipment  -KG     Assistive Device (Gait) walker, front-wheeled  -KG     Patient was able to Ambulate yes  -KG     Distance in Feet (Gait) 20  -KG     Deviations/Abnormal Patterns (Gait) festinating/shuffling;gait speed decreased;stride length decreased  -KG     Bilateral Gait Deviations forward flexed posture  -KG     Comment, (Gait/Stairs) Pt able to ambulate 20', FWW, CGA with chair follow, pt did well with hop to gait pattern.   However, became very fatigued and chair needed to be pulled up behind to sit.  -KG       Row Name 10/28/24 1018          Mobility    Extremity Weight-bearing Status left lower extremity  -KG     Left Lower Extremity (Weight-bearing Status) non weight-bearing (NWB)  -KG               User Key  (r) = Recorded By, (t) = Taken By, (c) = Cosigned By      Initials Name Provider Type    NICHO Elva Drake Physical Therapist                   Obj/Interventions       Row Name 10/28/24 1022          Motor Skills    Therapeutic Exercise other (see comments)  SLR, quad sets, LAQ, chair push ups  -KG       Row Name 10/28/24 1022          Balance    Dynamic Standing Balance contact guard;2-person assist  -KG     Position/Device Used, Standing Balance supported;walker, front-wheeled  -KG     Balance Interventions standing;sit to stand  -KG               User Key  (r) = Recorded By, (t) = Taken By, (c) = Cosigned By      Initials Name Provider Type    NICOH Elva Drake Physical Therapist                   Goals/Plan    No documentation.                  Clinical Impression       Row Name 10/28/24 1023          Pain    Pretreatment Pain Rating 0/10 - no pain  -KG     Posttreatment Pain Rating 0/10 - no pain  -KG       Row Name 10/28/24 1023          Plan of Care Review    Plan of Care Reviewed With patient  -KG     Progress improving  -KG     Outcome Evaluation STS, CGA, FWW good adherence with NWB LLE, however difficulty safety SPT to chair, pt wanted to sit prematurely before being properly aligned.  Pt able to ambulate 20', FWW, CGA with chair follow, pt did well with hop to gait pattern. However, became very fatigued and chair needed to be pulled up behind to sit.  continued recommendation IPR  -KG       Row Name 10/28/24 1023          Positioning and Restraints    Pre-Treatment Position in bed  -KG     Post Treatment Position chair  -KG     In Chair notified nsg;call light within reach;encouraged to call for assist;exit  alarm on;waffle cushion;legs elevated;on mechanical lift sling;compression device  -KG               User Key  (r) = Recorded By, (t) = Taken By, (c) = Cosigned By      Initials Name Provider Type    Elva Edmondson Physical Therapist                   Outcome Measures       Row Name 10/28/24 1025          How much help from another person do you currently need...    Turning from your back to your side while in flat bed without using bedrails? 4  -KG     Moving from lying on back to sitting on the side of a flat bed without bedrails? 4  -KG     Moving to and from a bed to a chair (including a wheelchair)? 3  -KG     Standing up from a chair using your arms (e.g., wheelchair, bedside chair)? 3  -KG     Climbing 3-5 steps with a railing? 2  -KG     To walk in hospital room? 3  -KG     AM-PAC 6 Clicks Score (PT) 19  -KG     Highest Level of Mobility Goal 6 --> Walk 10 steps or more  -KG       Row Name 10/28/24 1025          Functional Assessment    Outcome Measure Options AM-PAC 6 Clicks Basic Mobility (PT)  -KG               User Key  (r) = Recorded By, (t) = Taken By, (c) = Cosigned By      Initials Name Provider Type    NICHO Elva Drake Physical Therapist                                 Physical Therapy Education       Title: PT OT SLP Therapies (Done)       Topic: Physical Therapy (Done)       Point: Mobility training (Done)       Learning Progress Summary            Patient Acceptance, E,D, VU,NR by SD at 10/27/2024 1340    Eager, E,D, VU,DU,NR by  at 10/26/2024 1211    Comment: Reviewed LLE NWB status, HEP, PT POC, and DC recommendations.   Other Eager, E,D, VU,DU,NR by  at 10/26/2024 1211    Comment: Reviewed LLE NWB status, HEP, PT POC, and DC recommendations.                      Point: Home exercise program (Done)       Learning Progress Summary            Patient Acceptance, E,D, VU,NR by SD at 10/27/2024 1340    Eager, E,D, VU,DU,NR by  at 10/26/2024 1211    Comment: Reviewed LLE NWB status,  HEP, PT POC, and DC recommendations.   Other Eager, E,D, VU,DU,NR by  at 10/26/2024 1211    Comment: Reviewed LLE NWB status, HEP, PT POC, and DC recommendations.                      Point: Body mechanics (Done)       Learning Progress Summary            Patient Acceptance, E,D, VU,NR by SD at 10/27/2024 1340    Eager, E,D, VU,DU,NR by  at 10/26/2024 1211    Comment: Reviewed LLE NWB status, HEP, PT POC, and DC recommendations.   Other Eager, E,D, VU,DU,NR by  at 10/26/2024 1211    Comment: Reviewed LLE NWB status, HEP, PT POC, and DC recommendations.                      Point: Precautions (Done)       Learning Progress Summary            Patient Acceptance, E,D, VU,NR by SD at 10/27/2024 1340    Eager, E,D, VU,DU,NR by  at 10/26/2024 1211    Comment: Reviewed LLE NWB status, HEP, PT POC, and DC recommendations.   Other Eager, E,D, VU,DU,NR by  at 10/26/2024 1211    Comment: Reviewed LLE NWB status, HEP, PT POC, and DC recommendations.                                      User Key       Initials Effective Dates Name Provider Type Discipline    SD 03/13/23 -  Nannette Crook PT Physical Therapist PT     09/21/21 -  Bill Levy PT Physical Therapist PT                  PT Recommendation and Plan     Progress: improving  Outcome Evaluation: STS, CGA, FWW good adherence with NWB LLE, however difficulty safety SPT to chair, pt wanted to sit prematurely before being properly aligned.  Pt able to ambulate 20', FWW, CGA with chair follow, pt did well with hop to gait pattern. However, became very fatigued and chair needed to be pulled up behind to sit.  continued recommendation IPR     Time Calculation:         PT Charges       Row Name 10/28/24 1025             Time Calculation    Start Time 0900  -KG      PT Received On 10/28/24  -KG         Time Calculation- PT    Total Timed Code Minutes- PT 40 minute(s)  -KG         Timed Charges    49369 - PT Therapeutic Exercise Minutes 15  -KG      19671 -  Gait Training Minutes  15  -KG      97103 - PT Therapeutic Activity Minutes 10  -KG         Total Minutes    Timed Charges Total Minutes 40  -KG       Total Minutes 40  -KG                User Key  (r) = Recorded By, (t) = Taken By, (c) = Cosigned By      Initials Name Provider Type    Elva Edmondson Physical Therapist                  Therapy Charges for Today       Code Description Service Date Service Provider Modifiers Qty    96750328237 HC PT THER PROC EA 15 MIN 10/28/2024 Elva Drake GP 1    83192482636 HC GAIT TRAINING EA 15 MIN 10/28/2024 Elva Drake GP 1    [id number]  PT THERAPEUTIC ACT EA 15 MIN 10/28/2024 Elva Drake GP 1            PT G-Codes  Outcome Measure Options: AM-PAC 6 Clicks Basic Mobility (PT)  AM-PAC 6 Clicks Score (PT): 19  AM-PAC 6 Clicks Score (OT): 17       Elva Drake  10/28/2024

## 2024-10-29 VITALS
HEART RATE: 77 BPM | OXYGEN SATURATION: 100 % | SYSTOLIC BLOOD PRESSURE: 129 MMHG | TEMPERATURE: 97.7 F | BODY MASS INDEX: 29.21 KG/M2 | HEIGHT: 64 IN | DIASTOLIC BLOOD PRESSURE: 81 MMHG | WEIGHT: 171.08 LBS | RESPIRATION RATE: 18 BRPM

## 2024-10-29 PROCEDURE — A9270 NON-COVERED ITEM OR SERVICE: HCPCS | Performed by: ORTHOPAEDIC SURGERY

## 2024-10-29 PROCEDURE — 63710000001 SERTRALINE 50 MG TABLET: Performed by: ORTHOPAEDIC SURGERY

## 2024-10-29 PROCEDURE — 63710000001 PANTOPRAZOLE 40 MG TABLET DELAYED-RELEASE: Performed by: ORTHOPAEDIC SURGERY

## 2024-10-29 PROCEDURE — 63710000001 CHOLECALCIFEROL 25 MCG (1000 UT) TABLET: Performed by: ORTHOPAEDIC SURGERY

## 2024-10-29 PROCEDURE — 63710000001 BUPROPION XL 150 MG TABLET SUSTAINED-RELEASE 24 HOUR: Performed by: ORTHOPAEDIC SURGERY

## 2024-10-29 PROCEDURE — 63710000001 ASPIRIN 81 MG TABLET DELAYED-RELEASE: Performed by: ORTHOPAEDIC SURGERY

## 2024-10-29 PROCEDURE — 63710000001 CARVEDILOL 12.5 MG TABLET: Performed by: ORTHOPAEDIC SURGERY

## 2024-10-29 PROCEDURE — 63710000001 SERTRALINE 25 MG TABLET: Performed by: ORTHOPAEDIC SURGERY

## 2024-10-29 PROCEDURE — 63710000001 ALPRAZOLAM 1 MG TABLET: Performed by: ORTHOPAEDIC SURGERY

## 2024-10-29 PROCEDURE — 63710000001 CLOPIDOGREL 75 MG TABLET: Performed by: ORTHOPAEDIC SURGERY

## 2024-10-29 PROCEDURE — 63710000001 ONDANSETRON ODT 4 MG TABLET DISPERSIBLE: Performed by: ORTHOPAEDIC SURGERY

## 2024-10-29 RX ORDER — ROPIVACAINE HYDROCHLORIDE 2 MG/ML
1 INJECTION, SOLUTION EPIDURAL; INFILTRATION; PERINEURAL CONTINUOUS
Start: 2024-10-29

## 2024-10-29 RX ORDER — OXYCODONE AND ACETAMINOPHEN 5; 325 MG/1; MG/1
1 TABLET ORAL EVERY 4 HOURS PRN
Start: 2024-10-29 | End: 2024-10-30

## 2024-10-29 RX ADMIN — BUPROPION HYDROCHLORIDE 300 MG: 150 TABLET, EXTENDED RELEASE ORAL at 08:03

## 2024-10-29 RX ADMIN — Medication 2000 UNITS: at 08:04

## 2024-10-29 RX ADMIN — ONDANSETRON 4 MG: 4 TABLET, ORALLY DISINTEGRATING ORAL at 05:05

## 2024-10-29 RX ADMIN — ALPRAZOLAM 1 MG: 1 TABLET ORAL at 05:08

## 2024-10-29 RX ADMIN — ASPIRIN 81 MG: 81 TABLET, COATED ORAL at 08:04

## 2024-10-29 RX ADMIN — PANTOPRAZOLE SODIUM 40 MG: 40 TABLET, DELAYED RELEASE ORAL at 05:09

## 2024-10-29 RX ADMIN — CARVEDILOL 12.5 MG: 12.5 TABLET, FILM COATED ORAL at 08:04

## 2024-10-29 RX ADMIN — SERTRALINE 75 MG: 50 TABLET, FILM COATED ORAL at 08:04

## 2024-10-29 RX ADMIN — CLOPIDOGREL BISULFATE 75 MG: 75 TABLET ORAL at 08:03

## 2024-10-29 NOTE — DISCHARGE PLACEMENT REQUEST
"Nallely Fontana (74 y.o. Female)       Date of Birth   1950    Social Security Number       Address   [address] [place] [id number]    Home Phone   [phone]    MRN   [id number]       [facility]    Marital Status                               Admission Date   10/25/24    Admission Type   Elective    Admitting Provider   Alex Martinez MD    Attending Provider   Alex Martinez MD    Department, Room/Bed   [facility] 3H, S369/1       Discharge Date       Discharge Disposition   Rehab Facility or Unit (DC - External)    Discharge Destination                                 Attending Provider: Alex Martinez MD    Allergies: No Known Allergies    Isolation: None   Infection: None   Code Status: Prior    Ht: 162.6 cm (64.02\")   Wt: 77.6 kg (171 lb 1.2 oz)    Admission Cmt: None   Principal Problem: S/P ORIF (open reduction internal fixation) fracture, left ankle bimalleolar [Z98.890,Z87.81]                   Active Insurance as of 10/25/2024       Primary Coverage       Payor Plan Insurance Group Employer/Plan Group    [employer] MEDICARE REPLACEMENT [employer]V [employer] [id number]       Payor Plan Address Payor Plan Phone Number Payor Plan Fax Number Effective Dates    [address] [address] [phone]  2023 - None Entered    [facility] [address]         Subscriber Name Subscriber Birth Date Member ID       NALLELY FONTANA 1950 [id number]                     Emergency Contacts        (Rel.) Home Phone Work Phone Mobile Phone    Luciano Valadez (Other) [phone] -- [phone]    Nessa Fontana (Daughter) [phone] -- [phone]    ÓscarKathleen (Sister) [phone] -- [phone]                 Discharge Summary        Anne Zuniga MD at 10/29/24 0957          Patient Name: Nallely Fontana  MRN: [id number]  : 1950  DOS: 10/29/2024    Attending: Alex Martinez MD    Primary Care Provider: Shay Rivera, " MD    Date of Admission:.10/25/2024 12:25 PM    Date of Discharge:  10/29/2024    Discharge Diagnosis:   S/P ORIF (open reduction internal fixation) fracture, left ankle bimalleolar    CAD (coronary artery disease)    Essential hypertension    Dyslipidemia    GERD (gastroesophageal reflux disease)    Anxiety    Depression    Ankle fracture, bimalleolar, closed, left, initial encounter    CKD (chronic kidney disease) stage 3, GFR 30-59 ml/min      Hospital Course    At admit:      Patient is a pleasant 74 y.o. female presented for scheduled surgery by Dr. Martinez.      She had a fall at home on 10/19/24 and injured her left ankle. She went to ER on 4/20/24 and was found to have a mildly displaced distal fibular and medial malleolar fractures. She was placed in a splint and has been partial weight-bearing with a walker.      I saw her preoperatively, discussed with patient and her daughter patient's past medical history and home medication regimen.  She lives alone.  Her daughter plans to help her at home however the daughter herself has a back injury and has limited ability to lift.     Subsequent notes indicate she later underwent left ankle open reduction internal fixation of bimalleolar fracture with application of short leg splint.  Surgery was done under general anesthesia and a block, was tolerated well, she was admitted for further management.    After admit:    Patient was provided pain medications as needed for pain control, along with popliteal nerve block infusion of Ropivacaine.      Adjustments were made to pain medications to optimize postop pain management.     Risks and benefits of opiate medications discussed with patient.  Ananth report in chart was reviewed prior to discharge.    She was seen by PT and OT has progressed slowly over her stay.  Both recommended inpatient rehab for best outcomes    She used an IS for atelectasis prophylaxis and aspirin along with mechanicals for DVT  prophylaxis.    Home medications were resumed as appropriate, and labs were monitored and remained fairly stable.     With the progress she has made, Ms. Mccabe is ready for DC home today.      Keep splint clean and dry until follow up appointment with Dr. Martinez.    Patient will have a popliteal nerve block (instructed on it during this admit)    Discussed with patient regarding plan and she shows understanding and agreement.     Procedures Performed      Date of Surgery:  10/25/2024     Indications: 74-year-old with left ankle bimalleolar ankle fracture.  Treatment options were discussed including operative versus nonoperative treatment.  Risks and benefits of surgery were discussed including but not limited to bleeding, infection, injury to surrounding structures such as blood vessels tendons and nerves. We discussed the possibility of surgical procedure failure, malunion, nonunion, painful hardware, decreased function, wound healing complications, persistent pain, loss of motion, persistent stiffness, persistent weakness, and the need for a revision surgery. In addition, we discussed the risk of heart attack, stroke, or death.  He understands these as well as alternative forms of treatment and does elect to proceed.     Pre-op Diagnosis:      Left ankle bimalleolar ankle fracture     Post-op Diagnosis:       Left ankle bimalleolar ankle fracture     Procedure/CPT® Codes:        Procedure(s):  ANKLE OPEN REDUCTION INTERNAL FIXATION left ankle by mall ankle fracture with application of short leg splint     Staff:  Surgeon(s):  Alex Martinez MD    Pertinent Test Results:    I reviewed the patient's new clinical results.   Results from last 7 days   Lab Units 10/28/24  0633 10/24/24  1352   WBC 10*3/mm3 6.48 8.34   HEMOGLOBIN g/dL 10.1* 11.5*   HEMATOCRIT % 31.3* 35.5   PLATELETS 10*3/mm3 291 357     Results from last 7 days   Lab Units 10/28/24  0634 10/24/24  1352   SODIUM mmol/L 141 138   POTASSIUM mmol/L  "3.7 4.5   CHLORIDE mmol/L 107 102   CO2 mmol/L 28.0 23.0   BUN mg/dL 16 15   CREATININE mg/dL 1.00 1.24*   CALCIUM mg/dL 8.3* 8.7   BILIRUBIN mg/dL  --  0.5   ALK PHOS U/L  --  86   ALT (SGPT) U/L  --  8   AST (SGOT) U/L  --  16   GLUCOSE mg/dL 95 121*       I reviewed the patient's new imaging including images and reports.      Physical therapy:   Elva Drake   Physical Therapist  Specialty: Physical Therapy     Plan of Care     Signed     Date of Service: 10/28/24 0900  Creation Time: 10/28/24 1025     Signed         Goal Outcome Evaluation:  Plan of Care Reviewed With: patient  Progress: improving  Outcome Evaluation: STS, CGA, FWW good adherence with NWB LLE, however difficulty safety SPT to chair, pt wanted to sit prematurely before being properly aligned.  Pt able to ambulate 20', FWW, CGA with chair follow, pt did well with hop to gait pattern. However, became very fatigued and chair needed to be pulled up behind to sit.  continued recommendation IPR                 Discharge Assessment:    Vital Signs  Visit Vitals  /81   Pulse 77   Temp 97.7 °F (36.5 °C) (Oral)   Resp 18   Ht 162.6 cm (64.02\")   Wt 77.6 kg (171 lb 1.2 oz)   SpO2 100%   BMI 29.35 kg/m²     Temp (24hrs), Av °F (36.7 °C), Min:97.7 °F (36.5 °C), Max:98.4 °F (36.9 °C)      General Appearance:    Alert, cooperative, in no acute distress   Lungs:     Clear to auscultation,respirations regular, even and unlabored    Heart:    Regular rhythm and normal rate, normal S1 and S2   Abdomen:     Normal bowel sounds, no masses, no organomegaly, soft non-tender, non-distended, no guarding, no rebound tenderness   Extremities:   LLE splint CDI. Nerve block present. Good cap refill and movement of toes    Pulses:   Pulses palpable and equal bilaterally   Skin:   No bleeding, bruising or rash            [facility]charge Disposition: [facility].          Discharge Medications        New Medications        Instructions Start Date   oxyCODONE-acetaminophen " 5-325 MG per tablet  Commonly known as: PERCOCET   1 tablet, Oral, Every 4 Hours PRN      ropivacaine 0.2 % infusion (INFUSYSTEM)  Commonly known as: NAROPIN   1 mL/hr (2 mg/hr), Peripheral Nerve, Continuous             Continue These Medications        Instructions Start Date   ALPRAZolam 1 MG tablet  Commonly known as: XANAX   1 mg, 3 Times Daily PRN      aspirin 81 MG EC tablet   81 mg, Oral, Daily      benzonatate 100 MG capsule  Commonly known as: TESSALON   100 mg, As Needed      buPROPion  MG 24 hr tablet  Commonly known as: WELLBUTRIN XL   300 mg, Daily      carboxymethylcellulose 0.5 % solution  Commonly known as: REFRESH PLUS   2 drops, 3 Times Daily PRN      carvedilol 12.5 MG tablet  Commonly known as: COREG   12.5 mg, 2 Times Daily With Meals      clopidogrel 75 MG tablet  Commonly known as: PLAVIX   75 mg, Oral, Daily      dicyclomine 10 MG capsule  Commonly known as: BENTYL   10 mg, As Needed      fluticasone 50 MCG/ACT nasal spray  Commonly known as: FLONASE   1 spray, Daily      ibuprofen 600 MG tablet  Commonly known as: ADVIL,MOTRIN   600 mg, Oral, Every 6 Hours PRN      lansoprazole 30 MG capsule  Commonly known as: PREVACID   Take 1 capsule by mouth 2 (Two) Times a Day.      losartan 50 MG tablet  Commonly known as: COZAAR   100 mg, Nightly      MegaRed Omega-3 Krill Oil 500 MG capsule   1,000 mg, Daily      methenamine 1 g tablet  Commonly known as: HIPREX   1 tablet, Every 12 Hours Scheduled      MIRALAX PO   As Needed      ondansetron ODT 4 MG disintegrating tablet  Commonly known as: ZOFRAN-ODT   Place 1 tablet on the tongue 4 (Four) Times a Day As Needed for Nausea or Vomiting.      pravastatin 20 MG tablet  Commonly known as: PRAVACHOL   20 mg, Oral, Every Night at Bedtime      PRESERVISION AREDS 2 PO   1 tablet, 2 Times Daily      sertraline 50 MG tablet  Commonly known as: ZOLOFT   75 mg, Daily      Vitamin D3 50 MCG (2000 UT) capsule   2,000 Units, Oral, Every Night at Bedtime              Stop These Medications      acetaminophen 500 MG tablet  Commonly known as: TYLENOL     diclofenac 1 % gel gel  Commonly known as: VOLTAREN     HYDROcodone-acetaminophen 5-325 MG per tablet  Commonly known as: NORCO     naloxone 4 MG/0.1ML nasal spray  Commonly known as: NARCAN              Discharge Diet: Regular.     Activity at Discharge: NWB LLE.    Follow-up Appointments  Dr. Martinez  per his  orders      Anne Zuniga MD  10/29/24  09:58 EDT      Electronically signed by Anne Zuniga MD at 10/29/24 1002

## 2024-10-29 NOTE — PROGRESS NOTES
"Orthopedic Daily Progress Note      CC: Postop day #3 status post ORIF left ankle bimalleolar ankle fracture    Pain  controlled      No other complaints    Physical Exam:  I have reviewed the vital signs.  Temp:  [97.7 °F (36.5 °C)-98.4 °F (36.9 °C)] 97.7 °F (36.5 °C)  Heart Rate:  [72-80] 76  Resp:  [18] 18  BP: (113-146)/(69-90) 146/87    Objective:  Vital signs: (most recent): Blood pressure 146/87, pulse 76, temperature 97.7 °F (36.5 °C), resp. rate 18, height 162.6 cm (64.02\"), weight 77.6 kg (171 lb 1.2 oz), SpO2 97%.              General Appearance:    Alert, cooperative, no distress    Skin: Dressing Clean/dry/intact  Wiggles toes  Foot is warm well-perfused    Results Review:    I have reviewed the labs, radiology results and diagnostic studies:      Results from last 7 days   Lab Units 10/28/24  0633   WBC 10*3/mm3 6.48   HEMOGLOBIN g/dL 10.1*   PLATELETS 10*3/mm3 291     Results from last 7 days   Lab Units 10/28/24  0634   SODIUM mmol/L 141   POTASSIUM mmol/L 3.7   CO2 mmol/L 28.0   CREATININE mg/dL 1.00   GLUCOSE mg/dL 95       I have reviewed the medications.    Assessment/Problem List  POD# 3   S/p ORIF of left ankle bimalleolar ankle fracture    Plan  Analgesia as needed  DVT prophylaxis-aspirin 81 mg daily x 4 weeks  PT OT-nonweightbearing to left lower extremity.    Okay to discharge when appropriate discharge planning has been established.  See discharge instructions below          Post-Operative Instructions:  Alex Martinez MD.      LOWER EXTREMITY SURGERY    Nerve Block Instructions:  You may have had a nerve block prior to surgery.  If so, you will not have control over the leg until the block has worn off (approx. 6-24 hours).  You should protect your leg during this time.   You may develop some tenderness at the block site.  You may be able to soothe this area with warm, moist heat.    Post-Operative Care:  Leave the splint on and intact until you are seen at your first follow-up " visit    Showering/Bathing:  Please keep the dressing and incisions clean and dry at all times until you are seen at your first follow-up visit.    Activity:  Non-weightbearing to lower extremity.  Elevate the extremity is much as possible this will help with swelling and pain control.    Medications/Pain Control:  Ice the area several times per day for 20 minutes at a time (minimum of 20 minutes between sessions)  Ice will help control swelling and pain  We recommend you take Tylenol 650 mg every 6 hours for the first 3-5 days.   You may also take ibuprofen 600 mg every 8 hours as needed for pain.    You will be discharged with a prescription for narcotic pain medication. We recommend you take the pain medication as prescribed for the first 24-48 hours, then decrease as tolerated.    Even with pain medication, some discomfort or pain is normal, however if you are experiencing a significant increase in pain, please notify our office.  Please purchase an over the counter stool softener (i.e. senna, docusate) to help prevent constipation (please take as instructed on the package).  Please Increase your fluids to prevent post-operative constipation.       Infection Prevention  Infection prevention is always one of our primary goals.  You received an antibiotic through your IV at the time of surgery and will not need an oral antibiotic  It is normal to have swelling, discomfort, some heat around the surgery site, a low-grade fever (<100 degrees), and clear/pink drainage.  Notify our office if, after 24 hours, you have marked and sudden increase in swelling, pain, or heat in the joint or white/yellow discharge or persistent fever > 100 degrees after Tylenol  You should take Aspirin 81mg daily for 4 weeks to limit the risk of blood clot. You should discontinue this if any stomach irritation develops.  Please do not use if you have any history of stomach ulcers or other adverse reaction to NSAIDs        Follow-Up  We  recommend that you not drive until you are off all pain medication and can safely operate a vehicle.    Please return to the office at your scheduled appointment time.  If you do not have an appointment scheduled, please call the office to schedule an appointment for 10-14 days after surgery unless otherwise specified below.  Please call [phone] if you need to schedule or change your appointment.    Additional Instructions:       If you have any questions or concerns, please contact [facility] at [phone]        Alex Martinez MD  10/29/24  07:42 EDT

## 2024-10-29 NOTE — DISCHARGE INSTRUCTIONS
InfuBLOCK - Patient Information    What is a pain pump?  The InfuBLOCK pump delivers post-operative, non-narcotic, numbing medication to the nerve near the surgical site for pain relief.     Where can I find information about my pain pump?           For more information about your pain pump, scan the QR code.  For additional patient resources, visit [email or web address]-[email or web address].                                                                                               While your physician is your primary source for information about your treatment there may be times during your treatment that you need assistance with your infusion pump.     If you need assistance take the following steps:    The [employer] Nursing Hotline is Here for You 24/7.  Please call [phone] for the following concerns or complications:    Answers to questions about your infusion pump                 Tubing disconnect  Assistance with pump alarms                                                      Dislodged catheter  Excessive leakage noted from pump                                         Inadequate pain control    2.   [facility] Anesthesia Acute Pain Service: [phone] is available 24/7 for any further needs or concerns about medication or pain control.     -------------------------------------------------------------------------    Nerve Catheter Removal Instructions  When your device is empty:    Remove your catheter by pulling the dressing off slowly (like you would remove a regular bandage). The catheter should pull right out of the skin.  Check that the BLUE tip is intact.                                                                                     If the catheter is stuck, reposition your   extremity and pull slowly until removed.  *If catheter is HURTING and WON'T come out, stop and call [phone] for further assistance.    Remove medication bag from the black carrying case.  Cut the  tubing on right and left side of pump, and discard the medication bag and tubing into garbage.  Place the pump and black carrying case into the plastic bag and then place this into the return box.  Seal box with blue stickers and return to [employer] postal service. THIS IS PRE-PAID POSTAGE.        -------------------------------------------------------------------------    [facility] COLD THERAPY - PATIENT INSTRUCTION SHEET    Cold Compression Therapy for your comfort and rehabilitation  Your caregivers want you to be productive in your rehab and comfortable during your stay. In keeping with those goals, you will be receiving an SMI Cold Therapy Wrap to help ease post-operative pain and swelling that might keep you from getting back on track! Your SMI Cold Therapy Wrap is effective and simple-to-use, and you will be encouraged to apply it throughout your hospital stay and at home through the duration of your recovery.    When you are ready to go home  Be sure to take your SMI Cold Therapy Wrap and both sets of Gel Bags with you for continued comfort and use throughout your rehabilitation. If you don't already have them, ask your nurse or aide to retrieve your SMI Gel Bags from the patient freezer.    Home use precautions  Always follow your medical professional's application instructions upon discharge. Your SMI Cold Therapy Wrap and Gel Bags are designed to last for months following your surgery. Never heat the Gel Bags unless specified by your healthcare provider. Supervision is advised when using this product on children or geriatric patients. To avoid danger of suffocation, please keep the outer plastic packaging away from children & pets.    Cold Therapy Instructions  Place Gel Bags in a freezer set ¾ of the way to max temperature for at least (4) hours. For best results, lay the Gel Bags flat and [id number] in the freezer. Once frozen, slide Gel Bags into the gel pouch and secure your wrap to the affected area with the  straps.  Gel wraps that have been stored in a freezer for an extended period of time may require a (10) minute period of softening up in a room temperature environment before application.  The gel pouch acts as a protective barrier. NEVER place frozen bags directly onto skin, as this may cause frostbite injury.  The [facility] Cold Therapy Wrap is designed to be able to be worm while ambulating. The compression straps can be secured well enough so that the Wrap won't fall off while moving.  Wrap Application Videos can be viewed at [email or web address].  An additional protective barrier such as clothing, a washcloth, hand-towel or pillowcase may be used during prolonged treatment applications.  The Gel-Pouch and Wrap are both Latex-Free and the Gel Bag ingredients are non toxic.    [facility] Wrap care instructions  The [facility] Cold Therapy Wrap may be hand washed and hung to dry when needed.    [facility] re-order information  Additional [facility] body specific wraps and/or Gel Bags can be re-ordered from [employer] or call [employer] ([phone])

## 2024-10-29 NOTE — PLAN OF CARE
Problem: Adult Inpatient Plan of Care  Goal: Plan of Care Review  Outcome: Progressing  Goal: Patient-Specific Goal (Individualized)  Outcome: Progressing  Goal: Absence of Hospital-Acquired Illness or Injury  Outcome: Progressing  Intervention: Identify and Manage Fall Risk  Recent Flowsheet Documentation  Taken 10/29/2024 0000 by Nicole Cross RN  Safety Promotion/Fall Prevention: safety round/check completed  Taken 10/28/2024 2200 by Nicole Cross RN  Safety Promotion/Fall Prevention: safety round/check completed  Taken 10/28/2024 2000 by Nicole Cross RN  Safety Promotion/Fall Prevention:   activity supervised   assistive device/personal items within reach   clutter free environment maintained   fall prevention program maintained   nonskid shoes/slippers when out of bed   room organization consistent  Intervention: Prevent Skin Injury  Recent Flowsheet Documentation  Taken 10/29/2024 0000 by Nicole Cross RN  Body Position: neutral body alignment  Skin Protection: incontinence pads utilized  Taken 10/28/2024 2200 by Nicole Cross RN  Body Position: neutral body alignment  Skin Protection: incontinence pads utilized  Taken 10/28/2024 2000 by Nicole Cross RN  Body Position: (up in chair) other (see comments)  Skin Protection: incontinence pads utilized  Intervention: Prevent and Manage VTE (Venous Thromboembolism) Risk  Recent Flowsheet Documentation  Taken 10/28/2024 2000 by Nicole Cross RN  VTE Prevention/Management:   right   SCDs (sequential compression devices) on  Intervention: Prevent Infection  Recent Flowsheet Documentation  Taken 10/29/2024 0000 by Nicole Cross RN  Infection Prevention: rest/sleep promoted  Taken 10/28/2024 2200 by Nicole Cross RN  Infection Prevention: rest/sleep promoted  Taken 10/28/2024 2000 by Nicole Cross RN  Infection Prevention:   environmental surveillance performed   hand hygiene promoted   single patient room provided  Goal:  Optimal Comfort and Wellbeing  Outcome: Progressing  Intervention: Monitor Pain and Promote Comfort  Recent Flowsheet Documentation  Taken 10/28/2024 2000 by Nicole Cross RN  Pain Management Interventions: pain pump in use  Intervention: Provide Person-Centered Care  Recent Flowsheet Documentation  Taken 10/28/2024 2000 by Nicole Cross RN  Trust Relationship/Rapport: care explained  Goal: Readiness for Transition of Care  Outcome: Progressing  Goal: Plan of Care Review  Outcome: Progressing  Goal: Patient-Specific Goal (Individualized)  Outcome: Progressing  Goal: Absence of Hospital-Acquired Illness or Injury  Outcome: Progressing  Intervention: Identify and Manage Fall Risk  Recent Flowsheet Documentation  Taken 10/29/2024 0000 by Nicole Cross RN  Safety Promotion/Fall Prevention: safety round/check completed  Taken 10/28/2024 2200 by Nicole Cross RN  Safety Promotion/Fall Prevention: safety round/check completed  Taken 10/28/2024 2000 by Nicole Cross RN  Safety Promotion/Fall Prevention:   activity supervised   assistive device/personal items within reach   clutter free environment maintained   fall prevention program maintained   nonskid shoes/slippers when out of bed   room organization consistent  Intervention: Prevent Skin Injury  Recent Flowsheet Documentation  Taken 10/29/2024 0000 by Nicole Cross RN  Body Position: neutral body alignment  Skin Protection: incontinence pads utilized  Taken 10/28/2024 2200 by Nicole Cross RN  Body Position: neutral body alignment  Skin Protection: incontinence pads utilized  Taken 10/28/2024 2000 by Nicole Cross RN  Body Position: (up in chair) other (see comments)  Skin Protection: incontinence pads utilized  Intervention: Prevent and Manage VTE (Venous Thromboembolism) Risk  Recent Flowsheet Documentation  Taken 10/28/2024 2000 by Nicole Cross RN  VTE Prevention/Management:   right   SCDs (sequential compression devices)  on  Intervention: Prevent Infection  Recent Flowsheet Documentation  Taken 10/29/2024 0000 by Nicole Cross RN  Infection Prevention: rest/sleep promoted  Taken 10/28/2024 2200 by Nicole Cross RN  Infection Prevention: rest/sleep promoted  Taken 10/28/2024 2000 by Nicole Cross RN  Infection Prevention:   environmental surveillance performed   hand hygiene promoted   single patient room provided  Goal: Optimal Comfort and Wellbeing  Outcome: Progressing  Intervention: Monitor Pain and Promote Comfort  Recent Flowsheet Documentation  Taken 10/28/2024 2000 by Nicole Cross RN  Pain Management Interventions: pain pump in use  Intervention: Provide Person-Centered Care  Recent Flowsheet Documentation  Taken 10/28/2024 2000 by Nicole Corss RN  Trust Relationship/Rapport: care explained  Goal: Readiness for Transition of Care  Outcome: Progressing     Problem: Mobility Impairment  Goal: Optimal Mobility  Outcome: Progressing  Intervention: Optimize Mobility  Recent Flowsheet Documentation  Taken 10/29/2024 0000 by Nicole Cross RN  Positioning/Transfer Devices:   pillows   in use  Taken 10/28/2024 2200 by Nicole Cross RN  Positioning/Transfer Devices:   pillows   in use  Taken 10/28/2024 2000 by Nicole Cross RN  Activity Management: up in chair  Positioning/Transfer Devices:   pillows   in use     Problem: Skin Injury Risk Increased  Goal: Skin Health and Integrity  Outcome: Progressing  Intervention: Optimize Skin Protection  Recent Flowsheet Documentation  Taken 10/29/2024 0000 by Nicole Cross RN  Pressure Reduction Techniques: frequent weight shift encouraged  Head of Bed (HOB) Positioning: [facility] elevated  Pressure Reduction Devices: pressure-redistributing mattress utilized  Skin Protection: incontinence pads utilized  Taken 10/28/2024 2200 by Nicole Cross RN  Pressure Reduction Techniques: frequent weight shift encouraged  Head of Bed (HOB) Positioning: HOB  elevated  Pressure Reduction Devices: pressure-redistributing mattress utilized  Skin Protection: incontinence pads utilized  Taken 10/28/2024 2000 by Nicole Cross RN  Activity Management: up in chair  Pressure Reduction Techniques: frequent weight shift encouraged  Head of Bed (HOB) Positioning: HOB elevated  Pressure Reduction Devices: chair cushion utilized  Skin Protection: incontinence pads utilized     Problem: Fall Injury Risk  Goal: Absence of Fall and Fall-Related Injury  Outcome: Progressing  Intervention: Identify and Manage Contributors  Recent Flowsheet Documentation  Taken 10/28/2024 2000 by Nicole Cross RN  Medication Review/Management: medications reviewed  Intervention: Promote Injury-Free Environment  Recent Flowsheet Documentation  Taken 10/29/2024 0000 by Nicole Cross RN  Safety Promotion/Fall Prevention: safety round/check completed  Taken 10/28/2024 2200 by Nicole Cross RN  Safety Promotion/Fall Prevention: safety round/check completed  Taken 10/28/2024 2000 by Nicole Cross RN  Safety Promotion/Fall Prevention:   activity supervised   assistive device/personal items within reach   clutter free environment maintained   fall prevention program maintained   nonskid shoes/slippers when out of bed   room organization consistent   Goal Outcome Evaluation:

## 2024-10-29 NOTE — NURSING NOTE
VSS on RA.  Pain controlled with PO meds.  Some complaints of nausea this morning. Pt up in chair.    Plan to D/C at 11:30. Call light within reach.  Will continue to monitor,.

## 2024-10-29 NOTE — PROGRESS NOTES
[facility]    Acute pain service Inpatient Progress Note    Patient Name: Nallely Mccabe  :  1950  MRN:  [id number]        Acute Pain  Service Inpatient Progress Note:    Analgesia:Excellent  Pain Score:10  LOC: alert and awake  Resp Status: room air  Cardiac: VS stable  Side Effects:None  Catheter Site:clean, dressing intact and dry  Cath type: peripheral nerve cath(InfuSystem)  Infusion rate: Ext/Pop: Basal: 1ml/hr, PIB: 5ml q 2 h, PCA: 5 ml q 30 min (1mL,5ml, 5ml InfuSystem Pump)  Dosing/Volume: ropivacaine 0.2%  Catheter Plan:Catheter to remain Insitu and Continue catheter infusion rate unchanged  Comments: The neuro assessment of the operative extremity includes the ability to flex and extend the toes. The neuro exam of the patient also includes sensory function throughout the operative extremity.

## 2024-10-29 NOTE — DISCHARGE SUMMARY
Patient Name: Nallely Mccabe  MRN: [id number]  : 1950  DOS: 10/29/2024    Attending: Alex Martinez MD    Primary Care Provider: Shay Rivera MD    Date of Admission:.10/25/2024 12:25 PM    Date of Discharge:  10/29/2024    Discharge Diagnosis:   S/P ORIF (open reduction internal fixation) fracture, left ankle bimalleolar    CAD (coronary artery disease)    Essential hypertension    Dyslipidemia    GERD (gastroesophageal reflux disease)    Anxiety    Depression    Ankle fracture, bimalleolar, closed, left, initial encounter    CKD (chronic kidney disease) stage 3, GFR 30-59 ml/min      Hospital Course    At admit:      Patient is a pleasant 74 y.o. female presented for scheduled surgery by Dr. Martinez.      She had a fall at home on 10/19/24 and injured her left ankle. She went to ER on 24 and was found to have a mildly displaced distal fibular and medial malleolar fractures. She was placed in a splint and has been partial weight-bearing with a walker.      I saw her preoperatively, discussed with patient and her daughter patient's past medical history and home medication regimen.  She lives alone.  Her daughter plans to help her at home however the daughter herself has a back injury and has limited ability to lift.     Subsequent notes indicate she later underwent left ankle open reduction internal fixation of bimalleolar fracture with application of short leg splint.  Surgery was done under general anesthesia and a block, was tolerated well, she was admitted for further management.    After admit:    Patient was provided pain medications as needed for pain control, along with popliteal nerve block infusion of Ropivacaine.      Adjustments were made to pain medications to optimize postop pain management.     Risks and benefits of opiate medications discussed with patient.  Ananth report in chart was reviewed prior to discharge.    She was seen by PT and OT has progressed slowly over her  stay.  Both recommended inpatient rehab for best outcomes    She used an IS for atelectasis prophylaxis and aspirin along with mechanicals for DVT prophylaxis.    Home medications were resumed as appropriate, and labs were monitored and remained fairly stable.     With the progress she has made, Ms. Mccabe is ready for DC home today.      Keep splint clean and dry until follow up appointment with Dr. Martinez.    Patient will have a popliteal nerve block (instructed on it during this admit)    Discussed with patient regarding plan and she shows understanding and agreement.     Procedures Performed      Date of Surgery:  10/25/2024     Indications: 74-year-old with left ankle bimalleolar ankle fracture.  Treatment options were discussed including operative versus nonoperative treatment.  Risks and benefits of surgery were discussed including but not limited to bleeding, infection, injury to surrounding structures such as blood vessels tendons and nerves. We discussed the possibility of surgical procedure failure, malunion, nonunion, painful hardware, decreased function, wound healing complications, persistent pain, loss of motion, persistent stiffness, persistent weakness, and the need for a revision surgery. In addition, we discussed the risk of heart attack, stroke, or death.  He understands these as well as alternative forms of treatment and does elect to proceed.     Pre-op Diagnosis:      Left ankle bimalleolar ankle fracture     Post-op Diagnosis:       Left ankle bimalleolar ankle fracture     Procedure/CPT® Codes:        Procedure(s):  ANKLE OPEN REDUCTION INTERNAL FIXATION left ankle by mall ankle fracture with application of short leg splint     Staff:  Surgeon(s):  Alex Martinez MD    Pertinent Test Results:    I reviewed the patient's new clinical results.   Results from last 7 days   Lab Units 10/28/24  0633 10/24/24  1352   WBC 10*3/mm3 6.48 8.34   HEMOGLOBIN g/dL 10.1* 11.5*   HEMATOCRIT % 31.3*  "35.5   PLATELETS 10*3/mm3 291 357     Results from last 7 days   Lab Units 10/28/24  0634 10/24/24  1352   SODIUM mmol/L 141 138   POTASSIUM mmol/L 3.7 4.5   CHLORIDE mmol/L 107 102   CO2 mmol/L 28.0 23.0   BUN mg/dL 16 15   CREATININE mg/dL 1.00 1.24*   CALCIUM mg/dL 8.3* 8.7   BILIRUBIN mg/dL  --  0.5   ALK PHOS U/L  --  86   ALT (SGPT) U/L  --  8   AST (SGOT) U/L  --  16   GLUCOSE mg/dL 95 121*       I reviewed the patient's new imaging including images and reports.      Physical therapy:   Elva Drake   Physical Therapist  Specialty: Physical Therapy     Plan of Care     Signed     Date of Service: 10/28/24 0900  Creation Time: 10/28/24 102     Signed         Goal Outcome Evaluation:  Plan of Care Reviewed With: patient  Progress: improving  Outcome Evaluation: STS, CGA, FWW good adherence with NWB LLE, however difficulty safety SPT to chair, pt wanted to sit prematurely before being properly aligned.  Pt able to ambulate 20', FWW, CGA with chair follow, pt did well with hop to gait pattern. However, became very fatigued and chair needed to be pulled up behind to sit.  continued recommendation IPR                 Discharge Assessment:    Vital Signs  Visit Vitals  /81   Pulse 77   Temp 97.7 °F (36.5 °C) (Oral)   Resp 18   Ht 162.6 cm (64.02\")   Wt 77.6 kg (171 lb 1.2 oz)   SpO2 100%   BMI 29.35 kg/m²     Temp (24hrs), Av °F (36.7 °C), Min:97.7 °F (36.5 °C), Max:98.4 °F (36.9 °C)      General Appearance:    Alert, cooperative, in no acute distress   Lungs:     Clear to auscultation,respirations regular, even and unlabored    Heart:    Regular rhythm and normal rate, normal S1 and S2   Abdomen:     Normal bowel sounds, no masses, no organomegaly, soft non-tender, non-distended, no guarding, no rebound tenderness   Extremities:   LLE splint CDI. Nerve block present. Good cap refill and movement of toes    Pulses:   Pulses palpable and equal bilaterally   Skin:   No bleeding, bruising or rash      "       Discharge Disposition: [facility].          Discharge Medications        New Medications        Instructions Start Date   oxyCODONE-acetaminophen 5-325 MG per tablet  Commonly known as: PERCOCET   1 tablet, Oral, Every 4 Hours PRN      ropivacaine 0.2 % infusion (INFUSYSTEM)  Commonly known as: NAROPIN   1 mL/hr (2 mg/hr), Peripheral Nerve, Continuous             Continue These Medications        Instructions Start Date   ALPRAZolam 1 MG tablet  Commonly known as: XANAX   1 mg, 3 Times Daily PRN      aspirin 81 MG EC tablet   81 mg, Oral, Daily      benzonatate 100 MG capsule  Commonly known as: TESSALON   100 mg, As Needed      buPROPion  MG 24 hr tablet  Commonly known as: WELLBUTRIN XL   300 mg, Daily      carboxymethylcellulose 0.5 % solution  Commonly known as: REFRESH PLUS   2 drops, 3 Times Daily PRN      carvedilol 12.5 MG tablet  Commonly known as: COREG   12.5 mg, 2 Times Daily With Meals      clopidogrel 75 MG tablet  Commonly known as: PLAVIX   75 mg, Oral, Daily      dicyclomine 10 MG capsule  Commonly known as: BENTYL   10 mg, As Needed      fluticasone 50 MCG/ACT nasal spray  Commonly known as: FLONASE   1 spray, Daily      ibuprofen 600 MG tablet  Commonly known as: ADVIL,MOTRIN   600 mg, Oral, Every 6 Hours PRN      lansoprazole 30 MG capsule  Commonly known as: PREVACID   Take 1 capsule by mouth 2 (Two) Times a Day.      losartan 50 MG tablet  Commonly known as: COZAAR   100 mg, Nightly      MegaRed Omega-3 Krill Oil 500 MG capsule   1,000 mg, Daily      methenamine 1 g tablet  Commonly known as: HIPREX   1 tablet, Every 12 Hours Scheduled      MIRALAX PO   As Needed      ondansetron ODT 4 MG disintegrating tablet  Commonly known as: ZOFRAN-ODT   Place 1 tablet on the tongue 4 (Four) Times a Day As Needed for Nausea or Vomiting.      pravastatin 20 MG tablet  Commonly known as: PRAVACHOL   20 mg, Oral, Every Night at Bedtime      PRESERVISION AREDS 2 PO   1 tablet, 2 Times Daily       sertraline 50 MG tablet  Commonly known as: ZOLOFT   75 mg, Daily      Vitamin D3 50 MCG (2000 UT) capsule   2,000 Units, Oral, Every Night at Bedtime             Stop These Medications      acetaminophen 500 MG tablet  Commonly known as: TYLENOL     diclofenac 1 % gel gel  Commonly known as: VOLTAREN     HYDROcodone-acetaminophen 5-325 MG per tablet  Commonly known as: NORCO     naloxone 4 MG/0.1ML nasal spray  Commonly known as: NARCAN              Discharge Diet: Regular.     Activity at Discharge: NWB LLE.    Follow-up Appointments  Dr. Martinez  per his  orders      Anne Zuniga MD  10/29/24  09:58 EDT

## 2025-01-29 ENCOUNTER — TELEMEDICINE (OUTPATIENT)
Dept: FAMILY MEDICINE CLINIC | Facility: TELEHEALTH | Age: 75
End: 2025-01-29

## 2025-01-29 DIAGNOSIS — R39.89 SUSPECTED UTI: Primary | ICD-10-CM

## 2025-01-29 RX ORDER — HYDRALAZINE HYDROCHLORIDE 10 MG/1
1 TABLET, FILM COATED ORAL EVERY 12 HOURS SCHEDULED
COMMUNITY
Start: 2025-01-20

## 2025-01-29 RX ORDER — TRAMADOL HYDROCHLORIDE 50 MG/1
TABLET ORAL
COMMUNITY
Start: 2024-11-15

## 2025-01-29 RX ORDER — OXYCODONE HYDROCHLORIDE 5 MG/1
TABLET ORAL
COMMUNITY
Start: 2024-10-24

## 2025-01-29 RX ORDER — SULFAMETHOXAZOLE AND TRIMETHOPRIM 800; 160 MG/1; MG/1
1 TABLET ORAL 2 TIMES DAILY
Qty: 10 TABLET | Refills: 0 | Status: SHIPPED | OUTPATIENT
Start: 2025-01-29 | End: 2025-02-03

## 2025-01-29 RX ORDER — ONDANSETRON 4 MG/1
TABLET, FILM COATED ORAL
COMMUNITY
Start: 2024-10-24

## 2025-01-29 NOTE — PROGRESS NOTES
Subjective   Chief Complaint   Patient presents with    Urinary Tract Infection       Nallely Mccabe is a 74 y.o. female.     History of Present Illness  Patient reports burning with urination and urinary frequency that started yesterday.  Symptoms feel similar to UTIs she has had in the past.  Urinary Tract Infection   This is a new problem. The current episode started yesterday. The problem occurs constantly. The quality of the pain is described as burning. There has been no fever. Associated symptoms include frequency. Pertinent negatives include no chills, discharge, flank pain, hematuria, hesitancy, nausea, possible pregnancy, sweats, urgency or vomiting. Treatments tried: azo. Her past medical history is significant for recurrent UTIs.        No Known Allergies    Past Medical History:   Diagnosis Date    Anxiety     CAD (coronary artery disease)     CKD (chronic kidney disease) stage 3, GFR 30-59 ml/min     Depression     GERD (gastroesophageal reflux disease)     Hyperlipidemia     Hypertension     Myocardial infarction 10/18/2011    Obesity (BMI 30.0-34.9)     Pyelonephritis     Pyelonephritis, acute 05/24/2018    Right sided    Sepsis     Sepsis 05/23/2018    UTI (urinary tract infection)        Past Surgical History:   Procedure Laterality Date    ANKLE OPEN REDUCTION INTERNAL FIXATION Left 10/25/2024    Procedure: ANKLE OPEN REDUCTION INTERNAL FIXATION LEFT;  Surgeon: Alex Martinez MD;  Location: [facility];  Service: Orthopedics;  Laterality: Left;    CARDIAC CATHETERIZATION  10/18/2011    COLONOSCOPY      CORONARY ANGIOPLASTY  10/18/2011    EYE SURGERY      ORIF HUMERUS FRACTURE      POLYPECTOMY      SINUS SURGERY         Social History     Socioeconomic History    Marital status:    Tobacco Use    Smoking status: Never    Smokeless tobacco: Never    Tobacco comments:     second hand smoke   Vaping Use    Vaping status: Never Used    Passive vaping exposure: Yes   Substance and Sexual  Activity    Alcohol use: No    Drug use: Never    Sexual activity: Not Currently     Partners: Male     Birth control/protection: Pill     Comment: birth control pills/ vascestomy       Family History   Problem Relation Age of Onset    Diabetes Mother     Kidney failure Mother     Anemia Mother     Arrhythmia Mother     Asthma Mother     Heart failure Mother     Hypertension Mother     Heart attack Father         rheumatic heart disease    Arthritis Sister     Hypertension Sister     Blindness Sister     Heart attack Maternal Grandmother     Heart failure Maternal Grandfather     Asthma Maternal Aunt     Asthma Maternal Uncle     Hypertension Sister     Breast cancer Neg Hx     Ovarian cancer Neg Hx          Current Outpatient Medications:     hydrALAZINE (APRESOLINE) 10 MG tablet, Take 1 tablet by mouth Every 12 (Twelve) Hours., Disp: , Rfl:     ondansetron (ZOFRAN) 4 MG tablet, Ondansetron HCl 4 MG Oral Tablet QTY: 30 tablet Days: 12 Refills: 0  Written: 10/24/24 Patient Instructions: 1 po q 6h prn nausea, Disp: , Rfl:     oxyCODONE (ROXICODONE) 5 MG immediate release tablet, oxyCODONE HCl 5 MG Oral Tablet QTY: 36 tablet Days: 3 Refills: 0  Written: 10/24/24 Patient Instructions: 1-2 po q 4-6h PRN for Post-op Pain, Disp: , Rfl:     traMADol (ULTRAM) 50 MG tablet, , Disp: , Rfl:     ALPRAZolam (XANAX) 1 MG tablet, Take 1 tablet by mouth 3 (Three) Times a Day As Needed for Anxiety., Disp: , Rfl: 0    aspirin 81 MG EC tablet, Take 1 tablet by mouth Daily., Disp: , Rfl:     benzonatate (TESSALON) 100 MG capsule, Take 1 capsule by mouth As Needed for Cough., Disp: , Rfl:     buPROPion XL (WELLBUTRIN XL) 300 MG 24 hr tablet, Take 1 tablet by mouth Daily., Disp: , Rfl: 0    carboxymethylcellulose (REFRESH PLUS) 0.5 % solution, Administer 2 drops to both eyes 3 (Three) Times a Day As Needed for Dry Eyes., Disp: , Rfl:     carvedilol (COREG) 12.5 MG tablet, Take 1 tablet by mouth 2 (Two) Times a Day With Meals.,  Disp: , Rfl:     Cholecalciferol (Vitamin D3) 50 MCG (2000 UT) capsule, Take 1 capsule by mouth every night at bedtime., Disp: 42 capsule, Rfl: 0    clopidogrel (PLAVIX) 75 MG tablet, Take 1 tablet by mouth Daily. (Patient taking differently: Take 1 tablet by mouth Daily. Office stated pt can continue; however, pt stopped 10/19/24), Disp: 90 tablet, Rfl: 3    dicyclomine (BENTYL) 10 MG capsule, Take 1 capsule by mouth As Needed for Abdominal Cramping., Disp: , Rfl: 0    fluticasone (FLONASE) 50 MCG/ACT nasal spray, Administer 1 spray into the nostril(s) as directed by provider Daily., Disp: , Rfl: 0    ibuprofen (ADVIL,MOTRIN) 600 MG tablet, Take 1 tablet by mouth Every 6 (Six) Hours As Needed for Mild Pain., Disp: 20 tablet, Rfl: 0    lansoprazole (PREVACID) 30 MG capsule, Take 1 capsule by mouth 2 (Two) Times a Day., Disp: , Rfl:     losartan (COZAAR) 50 MG tablet, Take 2 tablets by mouth Every Night., Disp: , Rfl:     MEGARED OMEGA-3 KRILL  MG capsule, Take 1,000 mg by mouth Daily., Disp: , Rfl:     methenamine (HIPREX) 1 g tablet, Take 1 tablet by mouth Every 12 (Twelve) Hours., Disp: , Rfl:     Multiple Vitamins-Minerals (PRESERVISION AREDS 2 PO), Take 1 tablet by mouth 2 (Two) Times a Day., Disp: , Rfl:     Polyethylene Glycol 3350 (MIRALAX PO), As Needed., Disp: , Rfl:     pravastatin (PRAVACHOL) 20 MG tablet, Take 1 tablet by mouth every night at bedtime., Disp: 90 tablet, Rfl: 3    ropivacaine (NAROPIN) 0.2 % infusion (INFUSYSTEM), 2 mg/hr by Peripheral Nerve route Continuous., Disp: , Rfl:     sertraline (ZOLOFT) 50 MG tablet, Take 1.5 tablets by mouth Daily., Disp: , Rfl:     sulfamethoxazole-trimethoprim (Bactrim DS) 800-160 MG per tablet, Take 1 tablet by mouth 2 (Two) Times a Day for 5 days., Disp: 10 tablet, Rfl: 0      Review of Systems   Constitutional:  Negative for chills, diaphoresis, fatigue and fever.   Gastrointestinal:  Negative for abdominal pain, nausea and vomiting.   Genitourinary:   Positive for dysuria and frequency. Negative for flank pain, hematuria, hesitancy, urgency, vaginal bleeding, vaginal discharge and vaginal pain.   Musculoskeletal:  Negative for back pain.        There were no vitals filed for this visit.    Objective   Physical Exam  Constitutional:       General: She is not in acute distress.     Appearance: Normal appearance. She is not ill-appearing, toxic-appearing or diaphoretic.   HENT:      Head: Normocephalic.      Mouth/Throat:      Lips: Pink.      Mouth: Mucous membranes are moist.   Pulmonary:      Effort: Pulmonary effort is normal.   Abdominal:      Tenderness: There is no abdominal tenderness (per pt).   Neurological:      Mental Status: She is alert and oriented to person, place, and time.          Procedures     Assessment & Plan   Diagnoses and all orders for this visit:    1. Suspected UTI (Primary)  -     sulfamethoxazole-trimethoprim (Bactrim DS) 800-160 MG per tablet; Take 1 tablet by mouth 2 (Two) Times a Day for 5 days.  Dispense: 10 tablet; Refill: 0      Wipe front to back, increase water to flush the kidneys and avoid bladder irritants such as caffeine and alcohol.    -Warning signs: severe abdominal/pelvic/back pain, fever >101, blood in urine - seek medical attention as soon as possible for a hands on/objective exam and possible labs.     If symptoms worsen or do not improve follow up with your PCP or visit your nearest Urgent Care Center or ER.      PLAN: Discussed dosing, side effects, recommended other symptomatic care.  Patient should follow up with primary care provider, Urgent Care or ER if symptoms worsen, fail to resolve or other symptoms need attention. Patient/family agree to the above.         PARISA Hess     Mode of Visit: Video  Location of patient: -HOME-  Location of provider: +HOME+  You have chosen to receive care through a telehealth visit.  The patient has signed the video visit consent form.  The visit included audio and  video interaction. No technical issues occurred during this visit.    The use of a video visit has been reviewed with the patient and verbal informed consent has been obtained. Myself and Nallely Mccabe participated in this visit. The patient is located at [address] [place] [address]. I am located in [place], [place]. Mychart and Zoom were utilized.        This visit was performed via Telehealth.  This patient has been instructed to follow-up with their primary care provider if their symptoms worsen or the treatment provided does not resolve their illness.

## 2025-03-21 ENCOUNTER — PATIENT ROUNDING (BHMG ONLY) (OUTPATIENT)
Dept: URGENT CARE | Facility: CLINIC | Age: 75
End: 2025-03-21
Payer: MEDICARE

## 2025-03-31 ENCOUNTER — E-VISIT (OUTPATIENT)
Dept: ADMINISTRATIVE | Facility: OTHER | Age: 75
End: 2025-03-31
Payer: MEDICARE

## 2025-04-01 NOTE — E-VISIT ESCALATED
Status: Referred Out  Date: 2025 19:14:23  Acuity Level: Not applicable  Referral message: Based on the information you provided during your interview, eVisit is not appropriate for treating your condition.  Patient: Nallely Mccabe  Patient : 1950  Patient Address: [address] [place] [address]  Patient Phone: [phone]  Clinician Response: Unavailable  Diagnosis: Unavailable  Diagnosis ICD: Unavailable     Patient Interview Questions and Responses: None available

## 2025-04-10 ENCOUNTER — TELEMEDICINE (OUTPATIENT)
Dept: FAMILY MEDICINE CLINIC | Facility: TELEHEALTH | Age: 75
End: 2025-04-10
Payer: MEDICARE

## 2025-04-10 DIAGNOSIS — R30.0 DYSURIA: Primary | ICD-10-CM

## 2025-04-10 PROBLEM — M79.672 LEFT FOOT PAIN: Status: ACTIVE | Noted: 2024-10-22

## 2025-04-10 PROBLEM — M25.512 PAIN IN JOINT OF LEFT SHOULDER: Status: ACTIVE | Noted: 2017-05-22

## 2025-04-10 PROBLEM — M25.511 PAIN IN JOINT OF RIGHT SHOULDER: Status: ACTIVE | Noted: 2020-07-24

## 2025-04-10 PROCEDURE — 1159F MED LIST DOCD IN RCRD: CPT | Performed by: NURSE PRACTITIONER

## 2025-04-10 PROCEDURE — 99213 OFFICE O/P EST LOW 20 MIN: CPT | Performed by: NURSE PRACTITIONER

## 2025-04-10 PROCEDURE — 1160F RVW MEDS BY RX/DR IN RCRD: CPT | Performed by: NURSE PRACTITIONER

## 2025-04-10 RX ORDER — PHENAZOPYRIDINE HYDROCHLORIDE 200 MG/1
200 TABLET, FILM COATED ORAL 3 TIMES DAILY PRN
Qty: 6 TABLET | Refills: 0 | Status: SHIPPED | OUTPATIENT
Start: 2025-04-10 | End: 2025-04-12

## 2025-04-10 RX ORDER — SULFAMETHOXAZOLE AND TRIMETHOPRIM 800; 160 MG/1; MG/1
1 TABLET ORAL 2 TIMES DAILY
Qty: 10 TABLET | Refills: 0 | Status: SHIPPED | OUTPATIENT
Start: 2025-04-10 | End: 2025-04-15

## 2025-04-10 RX ORDER — ESTRADIOL 0.1 MG/G
CREAM VAGINAL
COMMUNITY
Start: 2025-02-06

## 2025-04-11 NOTE — PROGRESS NOTES
You have chosen to receive care through a telehealth visit.  Do you consent to use a video/audio connection for your medical care today? Yes     CHIEF COMPLAINT  Chief Complaint   Patient presents with    Difficulty Urinating         HPI  Nallely Mccabe is a 74 y.o. female  presents with complaint of frequent urination and pain. Reports she had diarrhea and thinks she has a UTI now. Reports the diarrhea has stopped. Burning and frequency with urination. Reports she has had a UTI in the past. Reports she has a urologist that she seen in the past. Reports she has dilation in the past and thinks she needs again. Thinks she is not completely emptying her bladder.  No fever or chills. Mild Nausea. No vomiting. Reports no back pain. Reports she has had pyelonephritis in the past. Reports bactrim worked well last time.     Review of Systems   Constitutional:  Negative for chills, fatigue and fever.   HENT:  Negative for congestion, ear discharge, ear pain, sinus pressure, sinus pain and sore throat.    Respiratory:  Negative for cough, chest tightness, shortness of breath and wheezing.    Cardiovascular:  Negative for chest pain.   Gastrointestinal:  Positive for nausea. Negative for abdominal pain, diarrhea (resolved) and vomiting.   Genitourinary:  Positive for dysuria and frequency. Negative for flank pain and urgency.   Musculoskeletal:  Negative for back pain and myalgias.   Neurological:  Negative for dizziness and headaches.   Psychiatric/Behavioral: Negative.         Past Medical History:   Diagnosis Date    Anxiety     CAD (coronary artery disease)     CKD (chronic kidney disease) stage 3, GFR 30-59 ml/min     Depression     GERD (gastroesophageal reflux disease)     Hyperlipidemia     Hypertension     Myocardial infarction 10/18/2011    Obesity (BMI 30.0-34.9)     Pyelonephritis     Pyelonephritis, acute 05/24/2018    Right sided    Sepsis     Sepsis 05/23/2018    UTI (urinary tract infection)        Family  History   Problem Relation Age of Onset    Diabetes Mother     Kidney failure Mother     Anemia Mother     Arrhythmia Mother     Asthma Mother     Heart failure Mother     Hypertension Mother     Heart attack Father         rheumatic heart disease    Arthritis Sister     Hypertension Sister     Blindness Sister     Heart attack Maternal Grandmother     Heart failure Maternal Grandfather     Asthma Maternal Aunt     Asthma Maternal Uncle     Hypertension Sister     Breast cancer Neg Hx     Ovarian cancer Neg Hx        Social History     Socioeconomic History    Marital status:    Tobacco Use    Smoking status: Never    Smokeless tobacco: Never    Tobacco comments:     second hand smoke   Vaping Use    Vaping status: Never Used    Passive vaping exposure: Yes   Substance and Sexual Activity    Alcohol use: No    Drug use: Never    Sexual activity: Not Currently     Partners: Male     Birth control/protection: Pill     Comment: birth control pills/ vascestomy       Nallely Mccabe  reports that she has never smoked. She has never used smokeless tobacco. I have educated her on the risk of diseases from using tobacco products such as cancer, COPD, and heart disease.         I spent  1  minutes counseling the patient.              There were no vitals taken for this visit.    PHYSICAL EXAM  Physical Exam   Constitutional: She is oriented to person, place, and time. She appears well-developed and well-nourished. No distress.   HENT:   Head: Normocephalic and atraumatic.   Right Ear: Hearing normal.   Left Ear: Hearing normal.   Nose: No congestion.   Mouth/Throat: Mouth/Lips are normal.  Eyes: Conjunctivae and lids are normal.   Pulmonary/Chest: Effort normal.  No respiratory distress.  Abdominal: There is abdominal tenderness in the suprapubic area.   Patient directed exam  Mild suprapubic tenderness with deep palpation  No back pain with deep palpation   Neurological: She is alert and oriented to person,  place, and time.   Psychiatric: She has a normal mood and affect. Her speech is normal and behavior is normal.           Diagnoses and all orders for this visit:    1. Dysuria (Primary)    Other orders  -     phenazopyridine (Pyridium) 200 MG tablet; Take 1 tablet by mouth 3 (Three) Times a Day As Needed for Bladder Spasms or Dysuria for up to 2 days.  Dispense: 6 tablet; Refill: 0  -     sulfamethoxazole-trimethoprim (Bactrim DS) 800-160 MG per tablet; Take 1 tablet by mouth 2 (Two) Times a Day for 5 days.  Dispense: 10 tablet; Refill: 0    rest  Increase water  Creatinine clearance 47  Follow-up with your urologist call for an appointment  Declines UA and urine culture  PCP if symptoms continue  ER for any worsening symptoms such as high fever, abdominal pain, back pain or nausea vomiting      FOLLOW-UP  As discussed during visit with PCP/[facility] Care if no improvement or Urgent Care/Emergency Department if worsening of symptoms    Patient verbalizes understanding of medication dosage, comfort measures, instructions for treatment and follow-up.    Maggie Blankenship, APRN  04/10/2025  21:23 EDT    Mode of Visit: Video  Location of patient: -HOME-  Location of provider: +HOME+  You have chosen to receive care through a telehealth visit.  The patient has signed the video visit consent form.  The visit included audio and video interaction. No technical issues occurred during this visit.      The use of a video visit has been reviewed with the patient and verbal informed consent has been obtained. Myself and Nallely Mccabe participated in this visit. The patient is located in [address] [place] [address].   I am located in [place], [place]. [employer] and [employer] Video Client were utilized. I spent 5 minutes in the patient's chart for this visit.         Note Disclaimer: At [facility], we believe that sharing information builds trust and better   relationships. You are receiving this note because you recently visited  [facility]. It is possible you   will see health information before a provider has talked with you about it. This kind of information can   be easy to misunderstand. To help you fully understand what it means for your health, we urge you to   discuss this note with your provider.

## 2025-04-11 NOTE — TELEPHONE ENCOUNTER
Left VM for pt to return my call. Need to discuss pharmacy request for Plavix, but chart says pt not taking, though most recent office visit says pt should keep taking.

## 2025-04-14 RX ORDER — CLOPIDOGREL BISULFATE 75 MG/1
75 TABLET ORAL DAILY
Qty: 90 TABLET | Refills: 3 | Status: SHIPPED | OUTPATIENT
Start: 2025-04-14

## 2025-04-14 NOTE — TELEPHONE ENCOUNTER
Spoke with pt. She states she is taking this med. Will refill.    Lab Results   Component Value Date    WBC 6.48 10/28/2024    HGB 10.1 (L) 10/28/2024    HCT 31.3 (L) 10/28/2024    .0 (H) 10/28/2024     10/28/2024

## 2025-06-25 ENCOUNTER — TELEMEDICINE (OUTPATIENT)
Dept: FAMILY MEDICINE CLINIC | Facility: TELEHEALTH | Age: 75
End: 2025-06-25
Payer: MEDICARE

## 2025-06-25 DIAGNOSIS — R39.89 SUSPECTED UTI: Primary | ICD-10-CM

## 2025-06-25 DIAGNOSIS — T36.95XA ANTIBIOTIC-INDUCED YEAST INFECTION: ICD-10-CM

## 2025-06-25 DIAGNOSIS — B37.9 ANTIBIOTIC-INDUCED YEAST INFECTION: ICD-10-CM

## 2025-06-25 RX ORDER — FLUCONAZOLE 150 MG/1
150 TABLET ORAL ONCE
Qty: 1 TABLET | Refills: 0 | Status: SHIPPED | OUTPATIENT
Start: 2025-06-25 | End: 2025-06-25

## 2025-06-25 RX ORDER — PHENAZOPYRIDINE HYDROCHLORIDE 100 MG/1
100 TABLET, FILM COATED ORAL 3 TIMES DAILY PRN
Qty: 6 TABLET | Refills: 0 | Status: SHIPPED | OUTPATIENT
Start: 2025-06-25 | End: 2025-06-27

## 2025-06-25 RX ORDER — CEPHALEXIN 500 MG/1
500 CAPSULE ORAL 2 TIMES DAILY
Qty: 14 CAPSULE | Refills: 0 | Status: SHIPPED | OUTPATIENT
Start: 2025-06-25 | End: 2025-07-02

## 2025-06-25 RX ORDER — NITROFURANTOIN 25; 75 MG/1; MG/1
100 CAPSULE ORAL 2 TIMES DAILY
Qty: 14 CAPSULE | Refills: 0 | Status: SHIPPED | OUTPATIENT
Start: 2025-06-25 | End: 2025-06-25 | Stop reason: ALTCHOICE

## 2025-06-25 NOTE — PROGRESS NOTES
You have chosen to receive care through a telehealth visit.  Do you consent to use a video/audio connection for your medical care today? Yes     HPI  History of Present Illness  The patient is a 74-year-old female who presents today with frequent urination and burning during urination.    She reports an increase in urinary frequency accompanied by a burning sensation. A home test for urinary tract infection (UTI) was conducted, which returned positive results. She reports no presence of blood in her urine or any febrile symptoms. She has a history of UTIs, with the most recent episode occurring in 11/2024. She has previously been treated with Cipro and Macrobid for her UTIs. She has expressed interest in obtaining Pyridium for pain management and Diflucan as a precautionary measure against potential yeast infections.    She has been recovering from a broken ankle and persistent anemia. Her doctor has prescribed B12 for her and also some B12 injections. Anemia runs in her family.    She had an issue with sodium levels dropping down to 16 because she was drinking too much water, so she usually drinks around 70 ounces a day. She will go back to see her nephrologist in 07/2025. She has chronic kidney disease stage 3. According to her nephrologist, as she ages, her kidney function will start to decline, and it is not anything for her to worry about as long as she follows his rules.    She had labs done at Dr. Darden on 05/29/2025, and they called her and told her they wanted to get her started on B12. They said all the other stats look good. She will go in to see Dr. Salas next month and is sure he will do blood work. She is still taking Xanax as needed for anxiety, aspirin, Wellbutrin, and Tessalon Perles as needed. She reports back pain and flank pain, but attributes it to a recent incident where she knocked her hip against a door facing, resulting in a bruise.    FAMILY HISTORY  Anemia runs in her  family.    Review of Systems   Constitutional: Negative.    Gastrointestinal: Negative.    Genitourinary:  Positive for dysuria, frequency and urgency.   Neurological: Negative.    Psychiatric/Behavioral: Negative.         Past Medical History:   Diagnosis Date    Anxiety     CAD (coronary artery disease)     CKD (chronic kidney disease) stage 3, GFR 30-59 ml/min     Depression     GERD (gastroesophageal reflux disease)     Hyperlipidemia     Hypertension     Myocardial infarction 10/18/2011    Obesity (BMI 30.0-34.9)     Pyelonephritis     Pyelonephritis, acute 05/24/2018    Right sided    Sepsis     Sepsis 05/23/2018    UTI (urinary tract infection)        Family History   Problem Relation Age of Onset    Diabetes Mother     Kidney failure Mother     Anemia Mother     Arrhythmia Mother     Asthma Mother     Heart failure Mother     Hypertension Mother     Heart attack Father         rheumatic heart disease    Arthritis Sister     Hypertension Sister     Blindness Sister     Heart attack Maternal Grandmother     Heart failure Maternal Grandfather     Asthma Maternal Aunt     Asthma Maternal Uncle     Hypertension Sister     Breast cancer Neg Hx     Ovarian cancer Neg Hx        Social History     Socioeconomic History    Marital status:    Tobacco Use    Smoking status: Never    Smokeless tobacco: Never    Tobacco comments:     second hand smoke   Vaping Use    Vaping status: Never Used    Passive vaping exposure: Yes   Substance and Sexual Activity    Alcohol use: No    Drug use: Never    Sexual activity: Not Currently     Partners: Male     Birth control/protection: Pill     Comment: birth control pills/ vascestomy         There were no vitals taken for this visit.    PHYSICAL EXAM  Physical Exam   Constitutional: She appears well-developed and well-nourished. She does not have a sickly appearance. She does not appear ill. No distress.   HENT:   Head: Normocephalic.   Pulmonary/Chest: Effort normal.   No respiratory distress.  Abdominal: She exhibits no distension. There is no abdominal tenderness.   Neurological: She is alert.   Psychiatric: She has a normal mood and affect.   Vitals reviewed.    Physical Exam      Diagnoses and all orders for this visit:    1. Suspected UTI (Primary)  -     cephalexin (KEFLEX) 500 MG capsule; Take 1 capsule by mouth 2 (Two) Times a Day for 7 days.  Dispense: 14 capsule; Refill: 0  -     phenazopyridine (Pyridium) 100 MG tablet; Take 1 tablet by mouth 3 (Three) Times a Day As Needed for Bladder Spasms for up to 2 days.  Dispense: 6 tablet; Refill: 0    2. Antibiotic-induced yeast infection  -     fluconazole (Diflucan) 150 MG tablet; Take 1 tablet by mouth 1 (One) Time for 1 dose.  Dispense: 1 tablet; Refill: 0    Other orders  -     Discontinue: nitrofurantoin, macrocrystal-monohydrate, (Macrobid) 100 MG capsule; Take 1 capsule by mouth 2 (Two) Times a Day.  Dispense: 14 capsule; Refill: 0    Follow up with PCP for worsening s/s or no improvement in 5-7 days.   Assessment & Plan  1. Urinary Tract Infection.  - Presents with frequent urination and burning during urination, confirmed by a positive home test.  - No fever or blood in the urine; previous UTI in 11/2024 and symptoms of burning with urination in 04/2025.  - Keflex will be prescribed as it is safe for her kidneys; Pyridium for pain management; Diflucan as a precautionary measure against potential yeast infections.  - If there is no improvement within 3 to 4 days, she should seek immediate medical attention.    2. Anemia.  - History of persistent anemia; currently on B12 supplements and injections.  - Anemia runs in her family; recent labs done on 05/29/2025, advised to start B12.  - Will follow up with her doctor next month for additional blood work.    3. Chronic Kidney Disease Stage 3.  - Chronic kidney disease stage 3; under the care of a nephrologist.  - Advised to follow nephrologist's guidelines, including fluid  intake recommendations.  - Will follow up with her nephrologist in 07/2025.    4. Anxiety.  - Continues to take Xanax as needed for anxiety.  - Medication regimen includes aspirin, Wellbutrin, and Tessalon Perles as needed.  - No changes in anxiety medication management at this time.      FOLLOW-UP  As discussed during visit with [facility], if symptoms worsen or fail to improve, follow-up with PCP/Urgent Care/Emergency Department.    Patient verbalizes understanding of medications, instructions for treatment and follow-up.    Patient or patient representative verbalized consent for the use of Ambient Listening during the visit with  PARISA Parker for chart documentation. 6/25/2025  16:50 EDT    PARISA Parker  06/25/2025  16:50 EDT    The use of a video visit has been reviewed with the patient and verbal informed consent has been obtained. Myself and Nallely Mccabe participated in this visit. The patient is located in [facility], and I am located in [place], [place]. Take5roxanat and [employer] were utilized.

## (undated) DEVICE — ANTIBACTERIAL UNDYED BRAIDED (POLYGLACTIN 910), SYNTHETIC ABSORBABLE SUTURE: Brand: COATED VICRYL

## (undated) DEVICE — SPNG GZ WOVN 4X4IN 12PLY 10/BX STRL

## (undated) DEVICE — BNDG ELAS CO-FLEX SLF ADHR 6IN 5YD LF STRL

## (undated) DEVICE — PK EXTREM LOWR 10

## (undated) DEVICE — DRSNG PAD ABD 8X10IN STRL

## (undated) DEVICE — GLV SURG SENSICARE PI MIC PF SZ7.5 LF STRL

## (undated) DEVICE — BLANKT WARM UPPR/BDY ARM/OUT 57X196CM

## (undated) DEVICE — SUT ETHLN 3/0 FSLX 30IN 1673H

## (undated) DEVICE — IMPLANTABLE DEVICE
Type: IMPLANTABLE DEVICE | Site: ANKLE | Status: NON-FUNCTIONAL
Removed: 2024-10-25

## (undated) DEVICE — GOWN,NON-REINFORCED,SIRUS,SET IN SLV,XL: Brand: MEDLINE

## (undated) DEVICE — TAPE,ELASTIC,FOAM,CURAD,4"X5.5YD,LF: Brand: CURAD

## (undated) DEVICE — KT PUMP INFUBLOCK MDL 2100 PMKITSOLIS

## (undated) DEVICE — DRP C/ARMOR

## (undated) DEVICE — Device

## (undated) DEVICE — UNDERCAST PADDING: Brand: DEROYAL

## (undated) DEVICE — GLV SURG SENSICARE PI MIC PF SZ8 LF STRL

## (undated) DEVICE — BNDG ELAS W/CLIP 6IN 10YD LF STRL

## (undated) DEVICE — PENCL SMOKE/EVAC MEGADYNE TELESCP 10FT

## (undated) DEVICE — TRAP FLD MINIVAC MEGADYNE 100ML

## (undated) DEVICE — DRSNG GZ CURAD XEROFORM NONADHR OVERWRAP 5X9IN

## (undated) DEVICE — PATIENT RETURN ELECTRODE, SINGLE-USE, CONTACT QUALITY MONITORING, ADULT, WITH 9FT CORD, FOR PATIENTS WEIGING OVER 33LBS. (15KG): Brand: MEGADYNE

## (undated) DEVICE — C-ARM DRAPE: Brand: DEROYAL